# Patient Record
Sex: MALE | Race: WHITE | NOT HISPANIC OR LATINO | Employment: UNEMPLOYED | URBAN - NONMETROPOLITAN AREA
[De-identification: names, ages, dates, MRNs, and addresses within clinical notes are randomized per-mention and may not be internally consistent; named-entity substitution may affect disease eponyms.]

---

## 2019-01-29 ENCOUNTER — APPOINTMENT (OUTPATIENT)
Dept: GENERAL RADIOLOGY | Facility: HOSPITAL | Age: 39
End: 2019-01-29

## 2019-01-29 ENCOUNTER — HOSPITAL ENCOUNTER (INPATIENT)
Facility: HOSPITAL | Age: 39
LOS: 2 days | Discharge: HOME | End: 2019-01-31
Attending: INTERNAL MEDICINE | Admitting: INTERNAL MEDICINE

## 2019-01-29 ENCOUNTER — HOSPITAL ENCOUNTER (INPATIENT)
Facility: HOSPITAL | Age: 39
LOS: 1 days | Discharge: LEFT BEFORE DISCHARGE | End: 2019-01-29
Attending: EMERGENCY MEDICINE | Admitting: INTERNAL MEDICINE

## 2019-01-29 VITALS
OXYGEN SATURATION: 100 % | HEART RATE: 113 BPM | BODY MASS INDEX: 19.78 KG/M2 | DIASTOLIC BLOOD PRESSURE: 52 MMHG | SYSTOLIC BLOOD PRESSURE: 115 MMHG | RESPIRATION RATE: 28 BRPM | TEMPERATURE: 96.4 F | HEIGHT: 73 IN | WEIGHT: 149.2 LBS

## 2019-01-29 DIAGNOSIS — E10.10 DIABETIC KETOACIDOSIS WITHOUT COMA ASSOCIATED WITH TYPE 1 DIABETES MELLITUS (HCC): Primary | ICD-10-CM

## 2019-01-29 PROBLEM — N17.9 ACUTE RENAL FAILURE (HCC): Status: ACTIVE | Noted: 2019-01-29

## 2019-01-29 PROBLEM — E87.20 LACTIC ACIDOSIS: Status: ACTIVE | Noted: 2019-01-29

## 2019-01-29 PROBLEM — E86.9 VOLUME DEPLETION: Status: ACTIVE | Noted: 2019-01-29

## 2019-01-29 PROBLEM — E11.10 DKA (DIABETIC KETOACIDOSES): Status: ACTIVE | Noted: 2019-01-29

## 2019-01-29 PROBLEM — D72.829 LEUKOCYTOSIS: Status: ACTIVE | Noted: 2019-01-29

## 2019-01-29 PROBLEM — E87.1 HYPONATREMIA: Status: ACTIVE | Noted: 2019-01-29

## 2019-01-29 PROBLEM — E11.10 TYPE 2 DIABETES MELLITUS WITH KETOACIDOSIS, WITH LONG-TERM CURRENT USE OF INSULIN (HCC): Status: ACTIVE | Noted: 2019-01-29

## 2019-01-29 PROBLEM — Z79.4 TYPE 2 DIABETES MELLITUS WITH KETOACIDOSIS, WITH LONG-TERM CURRENT USE OF INSULIN (HCC): Status: ACTIVE | Noted: 2019-01-29

## 2019-01-29 PROBLEM — Z72.0 TOBACCO ABUSE: Status: ACTIVE | Noted: 2019-01-29

## 2019-01-29 PROBLEM — E87.5 HYPERKALEMIA: Status: ACTIVE | Noted: 2019-01-29

## 2019-01-29 LAB
ALBUMIN SERPL-MCNC: 3.7 G/DL (ref 3.5–5)
ALBUMIN SERPL-MCNC: 3.9 G/DL (ref 3.5–5)
ALBUMIN SERPL-MCNC: 4.3 G/DL (ref 3.5–5)
ALBUMIN/GLOB SERPL: 1.7 G/DL (ref 1.1–2.5)
ALBUMIN/GLOB SERPL: 1.8 G/DL (ref 1.1–2.5)
ALBUMIN/GLOB SERPL: 2 G/DL (ref 1.1–2.5)
ALP SERPL-CCNC: 228 U/L (ref 24–120)
ALP SERPL-CCNC: 260 U/L (ref 24–120)
ALP SERPL-CCNC: 292 U/L (ref 24–120)
ALT SERPL W P-5'-P-CCNC: 49 U/L (ref 0–54)
ALT SERPL W P-5'-P-CCNC: 51 U/L (ref 0–54)
ALT SERPL W P-5'-P-CCNC: 59 U/L (ref 0–54)
AMPHET+METHAMPHET UR QL: NEGATIVE
ANION GAP SERPL CALCULATED.3IONS-SCNC: 13 MMOL/L (ref 4–13)
ANION GAP SERPL CALCULATED.3IONS-SCNC: ABNORMAL MMOL/L (ref 4–13)
ARTERIAL PATENCY WRIST A: POSITIVE
ARTERIAL PATENCY WRIST A: POSITIVE
AST SERPL-CCNC: 20 U/L (ref 7–45)
AST SERPL-CCNC: 33 U/L (ref 7–45)
AST SERPL-CCNC: 48 U/L (ref 7–45)
ATMOSPHERIC PRESS: 755 MMHG
ATMOSPHERIC PRESS: 757 MMHG
BACTERIA UR QL AUTO: ABNORMAL /HPF
BARBITURATES UR QL SCN: NEGATIVE
BASE EXCESS BLDA CALC-SCNC: -24.5 MMOL/L (ref 0–2)
BASE EXCESS BLDA CALC-SCNC: -28 MMOL/L (ref 0–2)
BASOPHILS # BLD AUTO: 0.04 10*3/MM3 (ref 0–0.2)
BASOPHILS # BLD AUTO: 0.05 10*3/MM3 (ref 0–0.2)
BASOPHILS # BLD AUTO: 0.12 10*3/MM3 (ref 0–0.2)
BASOPHILS NFR BLD AUTO: 0.2 % (ref 0–2)
BASOPHILS NFR BLD AUTO: 0.2 % (ref 0–2)
BASOPHILS NFR BLD AUTO: 0.9 % (ref 0–2)
BDY SITE: ABNORMAL
BDY SITE: ABNORMAL
BENZODIAZ UR QL SCN: NEGATIVE
BILIRUB SERPL-MCNC: 0.4 MG/DL (ref 0.1–1)
BILIRUB SERPL-MCNC: 0.4 MG/DL (ref 0.1–1)
BILIRUB SERPL-MCNC: 0.6 MG/DL (ref 0.1–1)
BILIRUB UR QL STRIP: NEGATIVE
BODY TEMPERATURE: 37 C
BODY TEMPERATURE: 37 C
BUN BLD-MCNC: 25 MG/DL (ref 5–21)
BUN BLD-MCNC: 26 MG/DL (ref 5–21)
BUN BLD-MCNC: 28 MG/DL (ref 5–21)
BUN BLD-MCNC: 29 MG/DL (ref 5–21)
BUN/CREAT SERPL: 18.2 (ref 7–25)
BUN/CREAT SERPL: 22.4 (ref 7–25)
BUN/CREAT SERPL: 24.2 (ref 7–25)
BUN/CREAT SERPL: 27.5 (ref 7–25)
CALCIUM SPEC-SCNC: 8 MG/DL (ref 8.4–10.4)
CALCIUM SPEC-SCNC: 8.1 MG/DL (ref 8.4–10.4)
CALCIUM SPEC-SCNC: 8.9 MG/DL (ref 8.4–10.4)
CALCIUM SPEC-SCNC: 9.2 MG/DL (ref 8.4–10.4)
CANNABINOIDS SERPL QL: NEGATIVE
CHLORIDE SERPL-SCNC: 111 MMOL/L (ref 98–110)
CHLORIDE SERPL-SCNC: 89 MMOL/L (ref 98–110)
CHLORIDE SERPL-SCNC: 91 MMOL/L (ref 98–110)
CHLORIDE SERPL-SCNC: 96 MMOL/L (ref 98–110)
CLARITY UR: CLEAR
CO2 SERPL-SCNC: 14 MMOL/L (ref 24–31)
CO2 SERPL-SCNC: <5 MMOL/L (ref 24–31)
COCAINE UR QL: NEGATIVE
COLOR UR: YELLOW
CREAT BLD-MCNC: 0.91 MG/DL (ref 0.5–1.4)
CREAT BLD-MCNC: 1.2 MG/DL (ref 0.5–1.4)
CREAT BLD-MCNC: 1.25 MG/DL (ref 0.5–1.4)
CREAT BLD-MCNC: 1.43 MG/DL (ref 0.5–1.4)
D-LACTATE SERPL-SCNC: 4.1 MMOL/L (ref 0.5–2)
D-LACTATE SERPL-SCNC: 5.5 MMOL/L (ref 0.5–2)
DEPRECATED RDW RBC AUTO: 37.5 FL (ref 40–54)
DEPRECATED RDW RBC AUTO: 45.5 FL (ref 40–54)
DEPRECATED RDW RBC AUTO: 46.5 FL (ref 40–54)
EOSINOPHIL # BLD AUTO: 0 10*3/MM3 (ref 0–0.7)
EOSINOPHIL # BLD AUTO: 0 10*3/MM3 (ref 0–0.7)
EOSINOPHIL # BLD AUTO: 0.02 10*3/MM3 (ref 0–0.7)
EOSINOPHIL NFR BLD AUTO: 0 % (ref 0–4)
EOSINOPHIL NFR BLD AUTO: 0 % (ref 0–4)
EOSINOPHIL NFR BLD AUTO: 0.2 % (ref 0–4)
ERYTHROCYTE [DISTWIDTH] IN BLOOD BY AUTOMATED COUNT: 12.3 % (ref 12–15)
ERYTHROCYTE [DISTWIDTH] IN BLOOD BY AUTOMATED COUNT: 13.2 % (ref 12–15)
ERYTHROCYTE [DISTWIDTH] IN BLOOD BY AUTOMATED COUNT: 13.2 % (ref 12–15)
GFR SERPL CREATININE-BSD FRML MDRD: 55 ML/MIN/1.73
GFR SERPL CREATININE-BSD FRML MDRD: 64 ML/MIN/1.73
GFR SERPL CREATININE-BSD FRML MDRD: 67 ML/MIN/1.73
GFR SERPL CREATININE-BSD FRML MDRD: 92 ML/MIN/1.73
GLOBULIN UR ELPH-MCNC: 2.2 GM/DL
GLUCOSE BLD-MCNC: 1061 MG/DL (ref 70–100)
GLUCOSE BLD-MCNC: 1178 MG/DL (ref 70–100)
GLUCOSE BLD-MCNC: 1363 MG/DL (ref 70–100)
GLUCOSE BLD-MCNC: 314 MG/DL (ref 70–100)
GLUCOSE BLD-MCNC: 763 MG/DL (ref 70–100)
GLUCOSE BLDC GLUCOMTR-MCNC: 213 MG/DL (ref 70–130)
GLUCOSE BLDC GLUCOMTR-MCNC: 267 MG/DL (ref 70–130)
GLUCOSE BLDC GLUCOMTR-MCNC: 335 MG/DL (ref 70–130)
GLUCOSE BLDC GLUCOMTR-MCNC: 408 MG/DL (ref 70–130)
GLUCOSE BLDC GLUCOMTR-MCNC: 438 MG/DL (ref 70–130)
GLUCOSE UR STRIP-MCNC: ABNORMAL MG/DL
HBA1C MFR BLD: >14 %
HCO3 BLDA-SCNC: 1.9 MMOL/L (ref 20–26)
HCO3 BLDA-SCNC: 3.7 MMOL/L (ref 20–26)
HCT VFR BLD AUTO: 36.3 % (ref 40–52)
HCT VFR BLD AUTO: 38.7 % (ref 40–52)
HCT VFR BLD AUTO: 42.5 % (ref 40–52)
HGB BLD-MCNC: 12.4 G/DL (ref 14–18)
HGB BLD-MCNC: 13.1 G/DL (ref 14–18)
HGB BLD-MCNC: 13.5 G/DL (ref 14–18)
HGB UR QL STRIP.AUTO: ABNORMAL
HOLD SPECIMEN: NORMAL
HYALINE CASTS UR QL AUTO: ABNORMAL /LPF
IMM GRANULOCYTES # BLD AUTO: 0.15 10*3/MM3 (ref 0–0.03)
IMM GRANULOCYTES # BLD AUTO: 0.23 10*3/MM3 (ref 0–0.03)
IMM GRANULOCYTES # BLD AUTO: 0.32 10*3/MM3 (ref 0–0.03)
IMM GRANULOCYTES NFR BLD AUTO: 0.7 % (ref 0–5)
IMM GRANULOCYTES NFR BLD AUTO: 1.1 % (ref 0–5)
IMM GRANULOCYTES NFR BLD AUTO: 2.5 % (ref 0–5)
KETONES UR QL STRIP: ABNORMAL
LEUKOCYTE ESTERASE UR QL STRIP.AUTO: NEGATIVE
LYMPHOCYTES # BLD AUTO: 0.86 10*3/MM3 (ref 0.72–4.86)
LYMPHOCYTES # BLD AUTO: 0.99 10*3/MM3 (ref 0.72–4.86)
LYMPHOCYTES # BLD AUTO: 2.13 10*3/MM3 (ref 0.72–4.86)
LYMPHOCYTES NFR BLD AUTO: 16.5 % (ref 15–45)
LYMPHOCYTES NFR BLD AUTO: 4 % (ref 15–45)
LYMPHOCYTES NFR BLD AUTO: 4.7 % (ref 15–45)
Lab: ABNORMAL
MAGNESIUM SERPL-MCNC: 2.2 MG/DL (ref 1.4–2.2)
MAGNESIUM SERPL-MCNC: 2.2 MG/DL (ref 1.4–2.2)
MAGNESIUM SERPL-MCNC: 2.3 MG/DL (ref 1.4–2.2)
MCH RBC QN AUTO: 30.1 PG (ref 28–32)
MCH RBC QN AUTO: 30.6 PG (ref 28–32)
MCH RBC QN AUTO: 30.8 PG (ref 28–32)
MCHC RBC AUTO-ENTMCNC: 31.8 G/DL (ref 33–36)
MCHC RBC AUTO-ENTMCNC: 32 G/DL (ref 33–36)
MCHC RBC AUTO-ENTMCNC: 36.1 G/DL (ref 33–36)
MCV RBC AUTO: 85.2 FL (ref 82–95)
MCV RBC AUTO: 94.7 FL (ref 82–95)
MCV RBC AUTO: 95.6 FL (ref 82–95)
METHADONE UR QL SCN: NEGATIVE
MODALITY: ABNORMAL
MODALITY: ABNORMAL
MONOCYTES # BLD AUTO: 0.27 10*3/MM3 (ref 0.19–1.3)
MONOCYTES # BLD AUTO: 1.06 10*3/MM3 (ref 0.19–1.3)
MONOCYTES # BLD AUTO: 1.71 10*3/MM3 (ref 0.19–1.3)
MONOCYTES NFR BLD AUTO: 2.1 % (ref 4–12)
MONOCYTES NFR BLD AUTO: 5 % (ref 4–12)
MONOCYTES NFR BLD AUTO: 8 % (ref 4–12)
NEUTROPHILS # BLD AUTO: 10.07 10*3/MM3 (ref 1.87–8.4)
NEUTROPHILS # BLD AUTO: 18.51 10*3/MM3 (ref 1.87–8.4)
NEUTROPHILS # BLD AUTO: 18.78 10*3/MM3 (ref 1.87–8.4)
NEUTROPHILS NFR BLD AUTO: 77.8 % (ref 39–78)
NEUTROPHILS NFR BLD AUTO: 86.7 % (ref 39–78)
NEUTROPHILS NFR BLD AUTO: 89.4 % (ref 39–78)
NITRITE UR QL STRIP: NEGATIVE
NOTIFIED BY: ABNORMAL
NOTIFIED BY: ABNORMAL
NOTIFIED WHO: ABNORMAL
NOTIFIED WHO: ABNORMAL
NRBC BLD AUTO-RTO: 0 /100 WBC (ref 0–0)
OPIATES UR QL: NEGATIVE
PCO2 BLDA: 12.7 MM HG (ref 35–45)
PCO2 BLDA: <8.9 MM HG (ref 35–45)
PCP UR QL SCN: NEGATIVE
PH BLDA: 6.97 PH UNITS (ref 7.35–7.45)
PH BLDA: 7.07 PH UNITS (ref 7.35–7.45)
PH UR STRIP.AUTO: <=5 [PH] (ref 5–8)
PHOSPHATE SERPL-MCNC: 1.1 MG/DL (ref 2.5–4.5)
PHOSPHATE SERPL-MCNC: 4.9 MG/DL (ref 2.5–4.5)
PLATELET # BLD AUTO: 249 10*3/MM3 (ref 130–400)
PLATELET # BLD AUTO: 272 10*3/MM3 (ref 130–400)
PLATELET # BLD AUTO: 295 10*3/MM3 (ref 130–400)
PMV BLD AUTO: 11.5 FL (ref 6–12)
PMV BLD AUTO: 12.4 FL (ref 6–12)
PMV BLD AUTO: 12.5 FL (ref 6–12)
PO2 BLDA: 113 MM HG (ref 83–108)
PO2 BLDA: 164 MM HG (ref 83–108)
POTASSIUM BLD-SCNC: 4.8 MMOL/L (ref 3.5–5.3)
POTASSIUM BLD-SCNC: 5.7 MMOL/L (ref 3.5–5.3)
POTASSIUM BLD-SCNC: 6 MMOL/L (ref 3.5–5.3)
POTASSIUM BLD-SCNC: 6.1 MMOL/L (ref 3.5–5.3)
PROT SERPL-MCNC: 5.9 G/DL (ref 6.3–8.7)
PROT SERPL-MCNC: 6.1 G/DL (ref 6.3–8.7)
PROT SERPL-MCNC: 6.5 G/DL (ref 6.3–8.7)
PROT UR QL STRIP: NEGATIVE
RBC # BLD AUTO: 4.05 10*6/MM3 (ref 4.8–5.9)
RBC # BLD AUTO: 4.26 10*6/MM3 (ref 4.8–5.9)
RBC # BLD AUTO: 4.49 10*6/MM3 (ref 4.8–5.9)
RBC # UR: ABNORMAL /HPF
REF LAB TEST METHOD: ABNORMAL
SAO2 % BLDCOA: 97.2 % (ref 94–99)
SAO2 % BLDCOA: 98.2 % (ref 94–99)
SODIUM BLD-SCNC: 123 MMOL/L (ref 135–145)
SODIUM BLD-SCNC: 129 MMOL/L (ref 135–145)
SODIUM BLD-SCNC: 129 MMOL/L (ref 135–145)
SODIUM BLD-SCNC: 138 MMOL/L (ref 135–145)
SP GR UR STRIP: >=1.03 (ref 1–1.03)
SQUAMOUS #/AREA URNS HPF: ABNORMAL /HPF
TROPONIN I SERPL-MCNC: <0.012 NG/ML (ref 0–0.03)
UROBILINOGEN UR QL STRIP: ABNORMAL
VENTILATOR MODE: ABNORMAL
VENTILATOR MODE: ABNORMAL
WBC NRBC COR # BLD: 12.93 10*3/MM3 (ref 4.8–10.8)
WBC NRBC COR # BLD: 21.02 10*3/MM3 (ref 4.8–10.8)
WBC NRBC COR # BLD: 21.36 10*3/MM3 (ref 4.8–10.8)
WBC UR QL AUTO: ABNORMAL /HPF
WHOLE BLOOD HOLD SPECIMEN: NORMAL

## 2019-01-29 PROCEDURE — 83605 ASSAY OF LACTIC ACID: CPT | Performed by: EMERGENCY MEDICINE

## 2019-01-29 PROCEDURE — 36415 COLL VENOUS BLD VENIPUNCTURE: CPT | Performed by: INTERNAL MEDICINE

## 2019-01-29 PROCEDURE — 83735 ASSAY OF MAGNESIUM: CPT | Performed by: INTERNAL MEDICINE

## 2019-01-29 PROCEDURE — 93005 ELECTROCARDIOGRAM TRACING: CPT | Performed by: EMERGENCY MEDICINE

## 2019-01-29 PROCEDURE — 83735 ASSAY OF MAGNESIUM: CPT | Performed by: EMERGENCY MEDICINE

## 2019-01-29 PROCEDURE — 82803 BLOOD GASES ANY COMBINATION: CPT

## 2019-01-29 PROCEDURE — 99285 EMERGENCY DEPT VISIT HI MDM: CPT

## 2019-01-29 PROCEDURE — 80307 DRUG TEST PRSMV CHEM ANLYZR: CPT | Performed by: EMERGENCY MEDICINE

## 2019-01-29 PROCEDURE — 84100 ASSAY OF PHOSPHORUS: CPT | Performed by: INTERNAL MEDICINE

## 2019-01-29 PROCEDURE — 25010000002 CEFTRIAXONE PER 250 MG: Performed by: EMERGENCY MEDICINE

## 2019-01-29 PROCEDURE — 87040 BLOOD CULTURE FOR BACTERIA: CPT | Performed by: EMERGENCY MEDICINE

## 2019-01-29 PROCEDURE — 93010 ELECTROCARDIOGRAM REPORT: CPT | Performed by: INTERNAL MEDICINE

## 2019-01-29 PROCEDURE — 80053 COMPREHEN METABOLIC PANEL: CPT | Performed by: EMERGENCY MEDICINE

## 2019-01-29 PROCEDURE — 85025 COMPLETE CBC W/AUTO DIFF WBC: CPT | Performed by: EMERGENCY MEDICINE

## 2019-01-29 PROCEDURE — 81001 URINALYSIS AUTO W/SCOPE: CPT | Performed by: EMERGENCY MEDICINE

## 2019-01-29 PROCEDURE — 63710000001 INSULIN REGULAR HUMAN PER 5 UNITS: Performed by: EMERGENCY MEDICINE

## 2019-01-29 PROCEDURE — 71046 X-RAY EXAM CHEST 2 VIEWS: CPT

## 2019-01-29 PROCEDURE — 82962 GLUCOSE BLOOD TEST: CPT

## 2019-01-29 PROCEDURE — 83605 ASSAY OF LACTIC ACID: CPT | Performed by: INTERNAL MEDICINE

## 2019-01-29 PROCEDURE — 80053 COMPREHEN METABOLIC PANEL: CPT | Performed by: INTERNAL MEDICINE

## 2019-01-29 PROCEDURE — 36600 WITHDRAWAL OF ARTERIAL BLOOD: CPT

## 2019-01-29 PROCEDURE — 84484 ASSAY OF TROPONIN QUANT: CPT | Performed by: EMERGENCY MEDICINE

## 2019-01-29 PROCEDURE — 63710000001 INSULIN REGULAR HUMAN PER 5 UNITS: Performed by: INTERNAL MEDICINE

## 2019-01-29 PROCEDURE — 83036 HEMOGLOBIN GLYCOSYLATED A1C: CPT | Performed by: INTERNAL MEDICINE

## 2019-01-29 PROCEDURE — 82947 ASSAY GLUCOSE BLOOD QUANT: CPT | Performed by: INTERNAL MEDICINE

## 2019-01-29 PROCEDURE — 85025 COMPLETE CBC W/AUTO DIFF WBC: CPT | Performed by: INTERNAL MEDICINE

## 2019-01-29 RX ORDER — POTASSIUM CHLORIDE 7.46 G/1000ML
10 INJECTION, SOLUTION INTRAVENOUS AS NEEDED
Status: DISCONTINUED | OUTPATIENT
Start: 2019-01-29 | End: 2019-01-29

## 2019-01-29 RX ORDER — SODIUM CHLORIDE 0.9 % (FLUSH) 0.9 %
3-10 SYRINGE (ML) INJECTION AS NEEDED
Status: DISCONTINUED | OUTPATIENT
Start: 2019-01-29 | End: 2019-01-31 | Stop reason: HOSPADM

## 2019-01-29 RX ORDER — POTASSIUM CHLORIDE 750 MG/1
40 CAPSULE, EXTENDED RELEASE ORAL AS NEEDED
Status: DISCONTINUED | OUTPATIENT
Start: 2019-01-29 | End: 2019-01-29

## 2019-01-29 RX ORDER — DEXTROSE MONOHYDRATE 25 G/50ML
12.5 INJECTION, SOLUTION INTRAVENOUS
Status: DISCONTINUED | OUTPATIENT
Start: 2019-01-29 | End: 2019-01-29

## 2019-01-29 RX ORDER — DEXTROSE, SODIUM CHLORIDE, AND POTASSIUM CHLORIDE 5; .45; .15 G/100ML; G/100ML; G/100ML
150 INJECTION INTRAVENOUS CONTINUOUS PRN
Status: DISCONTINUED | OUTPATIENT
Start: 2019-01-29 | End: 2019-01-30

## 2019-01-29 RX ORDER — POTASSIUM CHLORIDE 7.46 G/1000ML
10 INJECTION, SOLUTION INTRAVENOUS AS NEEDED
Status: DISCONTINUED | OUTPATIENT
Start: 2019-01-29 | End: 2019-01-29 | Stop reason: HOSPADM

## 2019-01-29 RX ORDER — DEXTROSE MONOHYDRATE 25 G/50ML
12.5 INJECTION, SOLUTION INTRAVENOUS
Status: DISCONTINUED | OUTPATIENT
Start: 2019-01-29 | End: 2019-01-29 | Stop reason: HOSPADM

## 2019-01-29 RX ORDER — POTASSIUM CHLORIDE 750 MG/1
10 CAPSULE, EXTENDED RELEASE ORAL AS NEEDED
Status: DISCONTINUED | OUTPATIENT
Start: 2019-01-29 | End: 2019-01-30

## 2019-01-29 RX ORDER — POTASSIUM CHLORIDE 750 MG/1
20 CAPSULE, EXTENDED RELEASE ORAL AS NEEDED
Status: DISCONTINUED | OUTPATIENT
Start: 2019-01-29 | End: 2019-01-29 | Stop reason: HOSPADM

## 2019-01-29 RX ORDER — LORAZEPAM 2 MG/ML
1 INJECTION INTRAMUSCULAR EVERY 6 HOURS PRN
Status: DISCONTINUED | OUTPATIENT
Start: 2019-01-29 | End: 2019-01-31 | Stop reason: HOSPADM

## 2019-01-29 RX ORDER — SODIUM CHLORIDE AND POTASSIUM CHLORIDE 150; 450 MG/100ML; MG/100ML
250 INJECTION, SOLUTION INTRAVENOUS CONTINUOUS PRN
Status: DISCONTINUED | OUTPATIENT
Start: 2019-01-29 | End: 2019-01-29 | Stop reason: HOSPADM

## 2019-01-29 RX ORDER — POTASSIUM CHLORIDE 1.5 G/1.77G
10 POWDER, FOR SOLUTION ORAL AS NEEDED
Status: DISCONTINUED | OUTPATIENT
Start: 2019-01-29 | End: 2019-01-29 | Stop reason: HOSPADM

## 2019-01-29 RX ORDER — POTASSIUM CHLORIDE 1.5 G/1.77G
40 POWDER, FOR SOLUTION ORAL AS NEEDED
Status: DISCONTINUED | OUTPATIENT
Start: 2019-01-29 | End: 2019-01-29 | Stop reason: HOSPADM

## 2019-01-29 RX ORDER — POTASSIUM CHLORIDE 750 MG/1
20 CAPSULE, EXTENDED RELEASE ORAL AS NEEDED
Status: DISCONTINUED | OUTPATIENT
Start: 2019-01-29 | End: 2019-01-30

## 2019-01-29 RX ORDER — POTASSIUM CHLORIDE 7.46 G/1000ML
10 INJECTION, SOLUTION INTRAVENOUS AS NEEDED
Status: DISCONTINUED | OUTPATIENT
Start: 2019-01-29 | End: 2019-01-30

## 2019-01-29 RX ORDER — SODIUM CHLORIDE AND POTASSIUM CHLORIDE 150; 450 MG/100ML; MG/100ML
250 INJECTION, SOLUTION INTRAVENOUS CONTINUOUS PRN
Status: DISCONTINUED | OUTPATIENT
Start: 2019-01-29 | End: 2019-01-30

## 2019-01-29 RX ORDER — POTASSIUM CHLORIDE 1.5 G/1.77G
20 POWDER, FOR SOLUTION ORAL AS NEEDED
Status: DISCONTINUED | OUTPATIENT
Start: 2019-01-29 | End: 2019-01-29

## 2019-01-29 RX ORDER — POTASSIUM CHLORIDE 1.5 G/1.77G
40 POWDER, FOR SOLUTION ORAL AS NEEDED
Status: DISCONTINUED | OUTPATIENT
Start: 2019-01-29 | End: 2019-01-30

## 2019-01-29 RX ORDER — POTASSIUM CHLORIDE 750 MG/1
20 CAPSULE, EXTENDED RELEASE ORAL AS NEEDED
Status: DISCONTINUED | OUTPATIENT
Start: 2019-01-29 | End: 2019-01-29

## 2019-01-29 RX ORDER — ONDANSETRON 2 MG/ML
4 INJECTION INTRAMUSCULAR; INTRAVENOUS EVERY 6 HOURS PRN
Status: DISCONTINUED | OUTPATIENT
Start: 2019-01-29 | End: 2019-01-31 | Stop reason: HOSPADM

## 2019-01-29 RX ORDER — OLANZAPINE 10 MG/1
10 TABLET, ORALLY DISINTEGRATING ORAL ONCE
Status: COMPLETED | OUTPATIENT
Start: 2019-01-29 | End: 2019-01-29

## 2019-01-29 RX ORDER — POTASSIUM CHLORIDE 750 MG/1
40 CAPSULE, EXTENDED RELEASE ORAL AS NEEDED
Status: DISCONTINUED | OUTPATIENT
Start: 2019-01-29 | End: 2019-01-30

## 2019-01-29 RX ORDER — NICOTINE 21 MG/24HR
1 PATCH, TRANSDERMAL 24 HOURS TRANSDERMAL EVERY 24 HOURS
Status: DISCONTINUED | OUTPATIENT
Start: 2019-01-29 | End: 2019-01-31 | Stop reason: HOSPADM

## 2019-01-29 RX ORDER — ONDANSETRON 2 MG/ML
4 INJECTION INTRAMUSCULAR; INTRAVENOUS EVERY 6 HOURS PRN
Status: DISCONTINUED | OUTPATIENT
Start: 2019-01-29 | End: 2019-01-29 | Stop reason: HOSPADM

## 2019-01-29 RX ORDER — SODIUM CHLORIDE 0.9 % (FLUSH) 0.9 %
3 SYRINGE (ML) INJECTION EVERY 12 HOURS SCHEDULED
Status: DISCONTINUED | OUTPATIENT
Start: 2019-01-29 | End: 2019-01-31 | Stop reason: HOSPADM

## 2019-01-29 RX ORDER — POTASSIUM CHLORIDE 1.5 G/1.77G
20 POWDER, FOR SOLUTION ORAL AS NEEDED
Status: DISCONTINUED | OUTPATIENT
Start: 2019-01-29 | End: 2019-01-29 | Stop reason: HOSPADM

## 2019-01-29 RX ORDER — POTASSIUM CHLORIDE 750 MG/1
10 CAPSULE, EXTENDED RELEASE ORAL AS NEEDED
Status: DISCONTINUED | OUTPATIENT
Start: 2019-01-29 | End: 2019-01-29

## 2019-01-29 RX ORDER — POTASSIUM CHLORIDE 1.5 G/1.77G
10 POWDER, FOR SOLUTION ORAL AS NEEDED
Status: DISCONTINUED | OUTPATIENT
Start: 2019-01-29 | End: 2019-01-29

## 2019-01-29 RX ORDER — DEXTROSE, SODIUM CHLORIDE, AND POTASSIUM CHLORIDE 5; .45; .15 G/100ML; G/100ML; G/100ML
150 INJECTION INTRAVENOUS CONTINUOUS PRN
Status: DISCONTINUED | OUTPATIENT
Start: 2019-01-29 | End: 2019-01-29

## 2019-01-29 RX ORDER — SODIUM CHLORIDE 450 MG/100ML
250 INJECTION, SOLUTION INTRAVENOUS CONTINUOUS
Status: DISCONTINUED | OUTPATIENT
Start: 2019-01-29 | End: 2019-01-30

## 2019-01-29 RX ORDER — POTASSIUM CHLORIDE 750 MG/1
10 CAPSULE, EXTENDED RELEASE ORAL AS NEEDED
Status: DISCONTINUED | OUTPATIENT
Start: 2019-01-29 | End: 2019-01-29 | Stop reason: HOSPADM

## 2019-01-29 RX ORDER — POTASSIUM CHLORIDE 1.5 G/1.77G
20 POWDER, FOR SOLUTION ORAL AS NEEDED
Status: DISCONTINUED | OUTPATIENT
Start: 2019-01-29 | End: 2019-01-30

## 2019-01-29 RX ORDER — DEXTROSE AND SODIUM CHLORIDE 5; .45 G/100ML; G/100ML
150 INJECTION, SOLUTION INTRAVENOUS CONTINUOUS PRN
Status: DISCONTINUED | OUTPATIENT
Start: 2019-01-29 | End: 2019-01-29 | Stop reason: HOSPADM

## 2019-01-29 RX ORDER — SODIUM CHLORIDE 0.9 % (FLUSH) 0.9 %
3 SYRINGE (ML) INJECTION EVERY 12 HOURS SCHEDULED
Status: DISCONTINUED | OUTPATIENT
Start: 2019-01-29 | End: 2019-01-29 | Stop reason: HOSPADM

## 2019-01-29 RX ORDER — DEXTROSE MONOHYDRATE 25 G/50ML
25-50 INJECTION, SOLUTION INTRAVENOUS
Status: DISCONTINUED | OUTPATIENT
Start: 2019-01-29 | End: 2019-01-29

## 2019-01-29 RX ORDER — SODIUM CHLORIDE AND POTASSIUM CHLORIDE 150; 450 MG/100ML; MG/100ML
250 INJECTION, SOLUTION INTRAVENOUS CONTINUOUS PRN
Status: DISCONTINUED | OUTPATIENT
Start: 2019-01-29 | End: 2019-01-29

## 2019-01-29 RX ORDER — POTASSIUM CHLORIDE 1.5 G/1.77G
10 POWDER, FOR SOLUTION ORAL AS NEEDED
Status: DISCONTINUED | OUTPATIENT
Start: 2019-01-29 | End: 2019-01-30

## 2019-01-29 RX ORDER — DEXTROSE AND SODIUM CHLORIDE 5; .45 G/100ML; G/100ML
150 INJECTION, SOLUTION INTRAVENOUS CONTINUOUS PRN
Status: DISCONTINUED | OUTPATIENT
Start: 2019-01-29 | End: 2019-01-29

## 2019-01-29 RX ORDER — POTASSIUM CHLORIDE 750 MG/1
40 CAPSULE, EXTENDED RELEASE ORAL AS NEEDED
Status: DISCONTINUED | OUTPATIENT
Start: 2019-01-29 | End: 2019-01-29 | Stop reason: HOSPADM

## 2019-01-29 RX ORDER — DEXTROSE, SODIUM CHLORIDE, AND POTASSIUM CHLORIDE 5; .45; .15 G/100ML; G/100ML; G/100ML
150 INJECTION INTRAVENOUS CONTINUOUS PRN
Status: DISCONTINUED | OUTPATIENT
Start: 2019-01-29 | End: 2019-01-29 | Stop reason: HOSPADM

## 2019-01-29 RX ORDER — DEXTROSE AND SODIUM CHLORIDE 5; .45 G/100ML; G/100ML
150 INJECTION, SOLUTION INTRAVENOUS CONTINUOUS PRN
Status: DISCONTINUED | OUTPATIENT
Start: 2019-01-29 | End: 2019-01-30

## 2019-01-29 RX ORDER — ACETAMINOPHEN 325 MG/1
650 TABLET ORAL EVERY 4 HOURS PRN
Status: DISCONTINUED | OUTPATIENT
Start: 2019-01-29 | End: 2019-01-29 | Stop reason: HOSPADM

## 2019-01-29 RX ORDER — POTASSIUM CHLORIDE 1.5 G/1.77G
40 POWDER, FOR SOLUTION ORAL AS NEEDED
Status: DISCONTINUED | OUTPATIENT
Start: 2019-01-29 | End: 2019-01-29

## 2019-01-29 RX ORDER — SODIUM CHLORIDE 450 MG/100ML
250 INJECTION, SOLUTION INTRAVENOUS CONTINUOUS
Status: DISCONTINUED | OUTPATIENT
Start: 2019-01-29 | End: 2019-01-29 | Stop reason: HOSPADM

## 2019-01-29 RX ORDER — DEXTROSE MONOHYDRATE 25 G/50ML
12.5 INJECTION, SOLUTION INTRAVENOUS
Status: DISCONTINUED | OUTPATIENT
Start: 2019-01-29 | End: 2019-01-30

## 2019-01-29 RX ORDER — VENLAFAXINE HYDROCHLORIDE 75 MG/1
75 CAPSULE, EXTENDED RELEASE ORAL DAILY
Status: ON HOLD | COMMUNITY
End: 2019-11-30 | Stop reason: SDDI

## 2019-01-29 RX ORDER — SODIUM CHLORIDE 0.9 % (FLUSH) 0.9 %
3-10 SYRINGE (ML) INJECTION AS NEEDED
Status: DISCONTINUED | OUTPATIENT
Start: 2019-01-29 | End: 2019-01-29 | Stop reason: HOSPADM

## 2019-01-29 RX ORDER — ACETAMINOPHEN 325 MG/1
650 TABLET ORAL EVERY 4 HOURS PRN
Status: DISCONTINUED | OUTPATIENT
Start: 2019-01-29 | End: 2019-01-31 | Stop reason: HOSPADM

## 2019-01-29 RX ADMIN — SODIUM CHLORIDE 1000 ML: 9 INJECTION, SOLUTION INTRAVENOUS at 13:45

## 2019-01-29 RX ADMIN — SODIUM CHLORIDE 1000 ML: 9 INJECTION, SOLUTION INTRAVENOUS at 09:31

## 2019-01-29 RX ADMIN — SODIUM CHLORIDE 0.1 UNITS/KG/HR: 9 INJECTION, SOLUTION INTRAVENOUS at 09:50

## 2019-01-29 RX ADMIN — SODIUM CHLORIDE 4 UNITS/HR: 9 INJECTION, SOLUTION INTRAVENOUS at 14:43

## 2019-01-29 RX ADMIN — Medication 100 MEQ: at 10:06

## 2019-01-29 RX ADMIN — SODIUM CHLORIDE 250 ML/HR: 4.5 INJECTION, SOLUTION INTRAVENOUS at 21:38

## 2019-01-29 RX ADMIN — SODIUM CHLORIDE 250 ML/HR: 4.5 INJECTION, SOLUTION INTRAVENOUS at 15:09

## 2019-01-29 RX ADMIN — NICOTINE 1 PATCH: 21 PATCH, EXTENDED RELEASE TRANSDERMAL at 15:03

## 2019-01-29 RX ADMIN — SODIUM CHLORIDE 250 ML/HR: 4.5 INJECTION, SOLUTION INTRAVENOUS at 09:50

## 2019-01-29 RX ADMIN — SODIUM CHLORIDE 1000 ML: 9 INJECTION, SOLUTION INTRAVENOUS at 16:48

## 2019-01-29 RX ADMIN — INSULIN HUMAN 6.8 UNITS: 100 INJECTION, SOLUTION PARENTERAL at 14:51

## 2019-01-29 RX ADMIN — OLANZAPINE 10 MG: 10 TABLET, ORALLY DISINTEGRATING ORAL at 10:07

## 2019-01-29 RX ADMIN — INSULIN HUMAN 6.8 UNITS: 100 INJECTION, SOLUTION PARENTERAL at 09:38

## 2019-01-29 RX ADMIN — SODIUM CHLORIDE, PRESERVATIVE FREE 3 ML: 5 INJECTION INTRAVENOUS at 14:49

## 2019-01-29 RX ADMIN — SODIUM CHLORIDE 2031 ML: 9 INJECTION, SOLUTION INTRAVENOUS at 10:57

## 2019-01-29 RX ADMIN — CEFTRIAXONE SODIUM 1 G: 1 INJECTION, POWDER, FOR SOLUTION INTRAMUSCULAR; INTRAVENOUS at 10:56

## 2019-01-29 RX ADMIN — SODIUM CHLORIDE 4 UNITS/KG/HR: 9 INJECTION, SOLUTION INTRAVENOUS at 21:47

## 2019-01-30 LAB
ANION GAP SERPL CALCULATED.3IONS-SCNC: 4 MMOL/L (ref 4–13)
ANION GAP SERPL CALCULATED.3IONS-SCNC: 9 MMOL/L (ref 4–13)
ARTICHOKE IGE QN: 131 MG/DL (ref 0–99)
BUN BLD-MCNC: 23 MG/DL (ref 5–21)
BUN BLD-MCNC: 24 MG/DL (ref 5–21)
BUN/CREAT SERPL: 33.8 (ref 7–25)
BUN/CREAT SERPL: 34.3 (ref 7–25)
CA-I BLD-MCNC: 4.83 MG/DL (ref 4.6–5.4)
CA-I BLD-MCNC: 4.83 MG/DL (ref 4.6–5.4)
CALCIUM SPEC-SCNC: 8.8 MG/DL (ref 8.4–10.4)
CALCIUM SPEC-SCNC: 8.8 MG/DL (ref 8.4–10.4)
CHLORIDE SERPL-SCNC: 112 MMOL/L (ref 98–110)
CHLORIDE SERPL-SCNC: 114 MMOL/L (ref 98–110)
CHOLEST SERPL-MCNC: 176 MG/DL (ref 130–200)
CO2 SERPL-SCNC: 19 MMOL/L (ref 24–31)
CO2 SERPL-SCNC: 20 MMOL/L (ref 24–31)
CREAT BLD-MCNC: 0.68 MG/DL (ref 0.5–1.4)
CREAT BLD-MCNC: 0.7 MG/DL (ref 0.5–1.4)
D-LACTATE SERPL-SCNC: 1 MMOL/L (ref 0.5–2)
GFR SERPL CREATININE-BSD FRML MDRD: 125 ML/MIN/1.73
GFR SERPL CREATININE-BSD FRML MDRD: 129 ML/MIN/1.73
GLUCOSE BLD-MCNC: 158 MG/DL (ref 70–100)
GLUCOSE BLD-MCNC: 252 MG/DL (ref 70–100)
GLUCOSE BLDC GLUCOMTR-MCNC: 168 MG/DL (ref 70–130)
GLUCOSE BLDC GLUCOMTR-MCNC: 183 MG/DL (ref 70–130)
GLUCOSE BLDC GLUCOMTR-MCNC: 208 MG/DL (ref 70–130)
GLUCOSE BLDC GLUCOMTR-MCNC: 209 MG/DL (ref 70–130)
GLUCOSE BLDC GLUCOMTR-MCNC: 224 MG/DL (ref 70–130)
GLUCOSE BLDC GLUCOMTR-MCNC: 240 MG/DL (ref 70–130)
GLUCOSE BLDC GLUCOMTR-MCNC: 260 MG/DL (ref 70–130)
GLUCOSE BLDC GLUCOMTR-MCNC: 260 MG/DL (ref 70–130)
GLUCOSE BLDC GLUCOMTR-MCNC: 263 MG/DL (ref 70–130)
GLUCOSE BLDC GLUCOMTR-MCNC: 281 MG/DL (ref 70–130)
GLUCOSE BLDC GLUCOMTR-MCNC: 325 MG/DL (ref 70–130)
GLUCOSE BLDC GLUCOMTR-MCNC: 328 MG/DL (ref 70–130)
GLUCOSE BLDC GLUCOMTR-MCNC: 90 MG/DL (ref 70–130)
HDLC SERPL-MCNC: 38 MG/DL
LDLC/HDLC SERPL: 3.05 {RATIO}
Lab: NORMAL
Lab: NORMAL
MAGNESIUM SERPL-MCNC: 2 MG/DL (ref 1.4–2.2)
MAGNESIUM SERPL-MCNC: 2 MG/DL (ref 1.4–2.2)
PHOSPHATE SERPL-MCNC: 1.9 MG/DL (ref 2.5–4.5)
PHOSPHATE SERPL-MCNC: 2.4 MG/DL (ref 2.5–4.5)
POTASSIUM BLD-SCNC: 3.5 MMOL/L (ref 3.5–5.3)
POTASSIUM BLD-SCNC: 4.1 MMOL/L (ref 3.5–5.3)
SODIUM BLD-SCNC: 138 MMOL/L (ref 135–145)
SODIUM BLD-SCNC: 140 MMOL/L (ref 135–145)
TRIGL SERPL-MCNC: 110 MG/DL (ref 0–149)

## 2019-01-30 PROCEDURE — 83605 ASSAY OF LACTIC ACID: CPT | Performed by: INTERNAL MEDICINE

## 2019-01-30 PROCEDURE — 84100 ASSAY OF PHOSPHORUS: CPT | Performed by: INTERNAL MEDICINE

## 2019-01-30 PROCEDURE — 83735 ASSAY OF MAGNESIUM: CPT | Performed by: INTERNAL MEDICINE

## 2019-01-30 PROCEDURE — 63710000001 INSULIN ISOPHANE & REGULAR PER 5 UNITS: Performed by: INTERNAL MEDICINE

## 2019-01-30 PROCEDURE — 36415 COLL VENOUS BLD VENIPUNCTURE: CPT | Performed by: INTERNAL MEDICINE

## 2019-01-30 PROCEDURE — 80048 BASIC METABOLIC PNL TOTAL CA: CPT | Performed by: INTERNAL MEDICINE

## 2019-01-30 PROCEDURE — 82962 GLUCOSE BLOOD TEST: CPT

## 2019-01-30 PROCEDURE — 82330 ASSAY OF CALCIUM: CPT

## 2019-01-30 PROCEDURE — 80061 LIPID PANEL: CPT | Performed by: INTERNAL MEDICINE

## 2019-01-30 PROCEDURE — 63710000001 INSULIN LISPRO (HUMAN) PER 5 UNITS: Performed by: INTERNAL MEDICINE

## 2019-01-30 RX ORDER — VENLAFAXINE HYDROCHLORIDE 75 MG/1
75 CAPSULE, EXTENDED RELEASE ORAL DAILY
Status: DISCONTINUED | OUTPATIENT
Start: 2019-01-30 | End: 2019-01-31 | Stop reason: HOSPADM

## 2019-01-30 RX ORDER — NICOTINE POLACRILEX 4 MG
15 LOZENGE BUCCAL
Status: DISCONTINUED | OUTPATIENT
Start: 2019-01-30 | End: 2019-01-31 | Stop reason: HOSPADM

## 2019-01-30 RX ORDER — DEXTROSE MONOHYDRATE 25 G/50ML
25 INJECTION, SOLUTION INTRAVENOUS
Status: DISCONTINUED | OUTPATIENT
Start: 2019-01-30 | End: 2019-01-31 | Stop reason: HOSPADM

## 2019-01-30 RX ADMIN — INSULIN HUMAN 15 UNITS: 100 INJECTION, SUSPENSION SUBCUTANEOUS at 17:43

## 2019-01-30 RX ADMIN — POTASSIUM PHOSPHATE, MONOBASIC AND POTASSIUM PHOSPHATE, DIBASIC 30 MMOL: 224; 236 INJECTION, SOLUTION INTRAVENOUS at 09:45

## 2019-01-30 RX ADMIN — NICOTINE 1 PATCH: 21 PATCH, EXTENDED RELEASE TRANSDERMAL at 15:02

## 2019-01-30 RX ADMIN — INSULIN LISPRO 7 UNITS: 100 INJECTION, SOLUTION INTRAVENOUS; SUBCUTANEOUS at 17:43

## 2019-01-30 RX ADMIN — POTASSIUM CHLORIDE, DEXTROSE MONOHYDRATE AND SODIUM CHLORIDE 150 ML/HR: 150; 5; 450 INJECTION, SOLUTION INTRAVENOUS at 00:35

## 2019-01-30 RX ADMIN — SODIUM CHLORIDE, PRESERVATIVE FREE 3 ML: 5 INJECTION INTRAVENOUS at 20:58

## 2019-01-30 RX ADMIN — VENLAFAXINE HYDROCHLORIDE 75 MG: 75 CAPSULE, EXTENDED RELEASE ORAL at 09:45

## 2019-01-30 RX ADMIN — INSULIN HUMAN 15 UNITS: 100 INJECTION, SUSPENSION SUBCUTANEOUS at 09:45

## 2019-01-31 VITALS
DIASTOLIC BLOOD PRESSURE: 86 MMHG | HEIGHT: 71 IN | SYSTOLIC BLOOD PRESSURE: 131 MMHG | RESPIRATION RATE: 16 BRPM | OXYGEN SATURATION: 95 % | WEIGHT: 156.6 LBS | BODY MASS INDEX: 21.92 KG/M2 | TEMPERATURE: 98.4 F | HEART RATE: 99 BPM

## 2019-01-31 LAB
ALBUMIN SERPL-MCNC: 2.8 G/DL (ref 3.5–5)
ALBUMIN/GLOB SERPL: 1.5 G/DL (ref 1.1–2.5)
ALP SERPL-CCNC: 183 U/L (ref 24–120)
ALT SERPL W P-5'-P-CCNC: 49 U/L (ref 0–54)
ANION GAP SERPL CALCULATED.3IONS-SCNC: 8 MMOL/L (ref 4–13)
AST SERPL-CCNC: 32 U/L (ref 7–45)
BASOPHILS # BLD AUTO: 0.04 10*3/MM3 (ref 0–0.2)
BASOPHILS NFR BLD AUTO: 0.5 % (ref 0–2)
BILIRUB SERPL-MCNC: 0.8 MG/DL (ref 0.1–1)
BUN BLD-MCNC: 17 MG/DL (ref 5–21)
BUN/CREAT SERPL: 27.4 (ref 7–25)
CALCIUM SPEC-SCNC: 8.6 MG/DL (ref 8.4–10.4)
CHLORIDE SERPL-SCNC: 104 MMOL/L (ref 98–110)
CO2 SERPL-SCNC: 21 MMOL/L (ref 24–31)
CREAT BLD-MCNC: 0.62 MG/DL (ref 0.5–1.4)
DEPRECATED RDW RBC AUTO: 40 FL (ref 40–54)
EOSINOPHIL # BLD AUTO: 0.09 10*3/MM3 (ref 0–0.7)
EOSINOPHIL NFR BLD AUTO: 1.1 % (ref 0–4)
ERYTHROCYTE [DISTWIDTH] IN BLOOD BY AUTOMATED COUNT: 13 % (ref 12–15)
GFR SERPL CREATININE-BSD FRML MDRD: 144 ML/MIN/1.73
GLOBULIN UR ELPH-MCNC: 1.9 GM/DL
GLUCOSE BLD-MCNC: 326 MG/DL (ref 70–100)
GLUCOSE BLDC GLUCOMTR-MCNC: 354 MG/DL (ref 70–130)
GLUCOSE BLDC GLUCOMTR-MCNC: 392 MG/DL (ref 70–130)
HCT VFR BLD AUTO: 32.2 % (ref 40–52)
HGB BLD-MCNC: 11.5 G/DL (ref 14–18)
IMM GRANULOCYTES # BLD AUTO: 0.04 10*3/MM3 (ref 0–0.03)
IMM GRANULOCYTES NFR BLD AUTO: 0.5 % (ref 0–5)
LYMPHOCYTES # BLD AUTO: 1.61 10*3/MM3 (ref 0.72–4.86)
LYMPHOCYTES NFR BLD AUTO: 19.3 % (ref 15–45)
MAGNESIUM SERPL-MCNC: 1.7 MG/DL (ref 1.4–2.2)
MCH RBC QN AUTO: 30.3 PG (ref 28–32)
MCHC RBC AUTO-ENTMCNC: 35.7 G/DL (ref 33–36)
MCV RBC AUTO: 85 FL (ref 82–95)
MONOCYTES # BLD AUTO: 0.43 10*3/MM3 (ref 0.19–1.3)
MONOCYTES NFR BLD AUTO: 5.1 % (ref 4–12)
NEUTROPHILS # BLD AUTO: 6.14 10*3/MM3 (ref 1.87–8.4)
NEUTROPHILS NFR BLD AUTO: 73.5 % (ref 39–78)
NRBC BLD AUTO-RTO: 0 /100 WBC (ref 0–0)
PHOSPHATE SERPL-MCNC: 3.1 MG/DL (ref 2.5–4.5)
PLATELET # BLD AUTO: 185 10*3/MM3 (ref 130–400)
PMV BLD AUTO: 12 FL (ref 6–12)
POTASSIUM BLD-SCNC: 4.1 MMOL/L (ref 3.5–5.3)
PROT SERPL-MCNC: 4.7 G/DL (ref 6.3–8.7)
RBC # BLD AUTO: 3.79 10*6/MM3 (ref 4.8–5.9)
SODIUM BLD-SCNC: 133 MMOL/L (ref 135–145)
WBC NRBC COR # BLD: 8.35 10*3/MM3 (ref 4.8–10.8)

## 2019-01-31 PROCEDURE — 84100 ASSAY OF PHOSPHORUS: CPT | Performed by: INTERNAL MEDICINE

## 2019-01-31 PROCEDURE — 85025 COMPLETE CBC W/AUTO DIFF WBC: CPT | Performed by: INTERNAL MEDICINE

## 2019-01-31 PROCEDURE — 63710000001 INSULIN ISOPHANE & REGULAR PER 5 UNITS: Performed by: INTERNAL MEDICINE

## 2019-01-31 PROCEDURE — 63710000001 INSULIN LISPRO (HUMAN) PER 5 UNITS: Performed by: INTERNAL MEDICINE

## 2019-01-31 PROCEDURE — 82962 GLUCOSE BLOOD TEST: CPT

## 2019-01-31 PROCEDURE — 83735 ASSAY OF MAGNESIUM: CPT | Performed by: INTERNAL MEDICINE

## 2019-01-31 PROCEDURE — 80053 COMPREHEN METABOLIC PANEL: CPT | Performed by: INTERNAL MEDICINE

## 2019-01-31 RX ORDER — INSULIN GLARGINE 100 [IU]/ML
25 INJECTION, SOLUTION SUBCUTANEOUS NIGHTLY
Qty: 3 PEN | Refills: 1 | Status: ON HOLD | OUTPATIENT
Start: 2019-01-31 | End: 2019-11-30

## 2019-01-31 RX ORDER — BLOOD-GLUCOSE METER
1 KIT MISCELLANEOUS AS NEEDED
Qty: 1 EACH | Refills: 0 | Status: ON HOLD | OUTPATIENT
Start: 2019-01-31 | End: 2019-11-30

## 2019-01-31 RX ORDER — PEN NEEDLE, DIABETIC 30 GX3/16"
1 NEEDLE, DISPOSABLE MISCELLANEOUS NIGHTLY
Qty: 100 EACH | Refills: 1 | Status: ON HOLD | OUTPATIENT
Start: 2019-01-31 | End: 2019-11-30

## 2019-01-31 RX ADMIN — METFORMIN HYDROCHLORIDE 1000 MG: 500 TABLET ORAL at 12:21

## 2019-01-31 RX ADMIN — VENLAFAXINE HYDROCHLORIDE 75 MG: 75 CAPSULE, EXTENDED RELEASE ORAL at 08:32

## 2019-01-31 RX ADMIN — INSULIN HUMAN 15 UNITS: 100 INJECTION, SUSPENSION SUBCUTANEOUS at 10:13

## 2019-01-31 RX ADMIN — SODIUM CHLORIDE, PRESERVATIVE FREE 3 ML: 5 INJECTION INTRAVENOUS at 08:33

## 2019-01-31 RX ADMIN — INSULIN LISPRO 8 UNITS: 100 INJECTION, SOLUTION INTRAVENOUS; SUBCUTANEOUS at 08:32

## 2019-01-31 RX ADMIN — INSULIN LISPRO 8 UNITS: 100 INJECTION, SOLUTION INTRAVENOUS; SUBCUTANEOUS at 12:20

## 2019-02-01 ENCOUNTER — READMISSION MANAGEMENT (OUTPATIENT)
Dept: CALL CENTER | Facility: HOSPITAL | Age: 39
End: 2019-02-01

## 2019-02-01 NOTE — OUTREACH NOTE
Prep Survey      Responses   Facility patient discharged from?  [facility]   Is patient eligible?  Yes   Discharge diagnosis  DKA   Does the patient have one of the following disease processes/diagnoses(primary or secondary)?  Other   Does the patient have Home health ordered?  No   Is there a DME ordered?  No   Comments regarding appointments  call for apmt - see AVS   Prep survey completed?  Yes          Bela Patel RN

## 2019-02-03 LAB
BACTERIA SPEC AEROBE CULT: NORMAL
BACTERIA SPEC AEROBE CULT: NORMAL

## 2019-02-04 ENCOUNTER — READMISSION MANAGEMENT (OUTPATIENT)
Dept: CALL CENTER | Facility: HOSPITAL | Age: 39
End: 2019-02-04

## 2019-02-04 NOTE — OUTREACH NOTE
Medical Week 1 Survey      Responses   Facility patient discharged from?  [facility]   Does the patient have one of the following disease processes/diagnoses(primary or secondary)?  Other   Is there a successful TCM telephone encounter documented?  No   Week 1 attempt successful?  Yes   Call start time  1408   Call end time  1418   Person spoke with today (if not patient) and relationship  Herlinda Camarillo mother   Meds reviewed with patient/caregiver?  Yes   Is the patient having any side effects they believe may be caused by any medication additions or changes?  No   Does the patient have all medications ordered at discharge?  Yes   Is the patient taking all medications as directed (includes completed medication regime)?  Yes   Does the patient have a primary care provider?   Yes   Does the patient have an appointment with their PCP within 7 days of discharge?  Yes   Has the patient kept scheduled appointments due by today?  N/A   Psychosocial issues?  No   Did the patient receive a copy of their discharge instructions?  Yes   Nursing interventions  Reviewed instructions with patient   What is the patient's perception of their health status since discharge?  Same   Is the patient/caregiver able to teach back signs and symptoms related to disease process for when to call PCP?  Yes   Is the patient/caregiver able to teach back signs and symptoms related to disease process for when to call 911?  Yes   Is the patient/caregiver able to teach back the hierarchy of who to call/visit for symptoms/problems? PCP, Specialist, Home health nurse, Urgent Care, ED, 911  Yes   Week 1 call completed?  Yes          Heather Velasquez RN

## 2019-02-12 ENCOUNTER — READMISSION MANAGEMENT (OUTPATIENT)
Dept: CALL CENTER | Facility: HOSPITAL | Age: 39
End: 2019-02-12

## 2019-02-12 NOTE — OUTREACH NOTE
Medical Week 2 Survey      Responses   Facility patient discharged from?  [place]   Does the patient have one of the following disease processes/diagnoses(primary or secondary)?  Other   Week 2 attempt successful?  No   Unsuccessful attempts  Attempt 1          Heather Velasquez RN

## 2019-02-14 ENCOUNTER — READMISSION MANAGEMENT (OUTPATIENT)
Dept: CALL CENTER | Facility: HOSPITAL | Age: 39
End: 2019-02-14

## 2019-02-14 NOTE — OUTREACH NOTE
Medical Week 2 Survey      Responses   Facility patient discharged from?  [facility]   Does the patient have one of the following disease processes/diagnoses(primary or secondary)?  Other   Week 2 attempt successful?  Yes   Call start time  1106   Discharge diagnosis  DKA   Rescheduled  Rescheduled-pt requested [Sister confirmed his phone number and states he is better]          Silvia Perdomo RN

## 2019-02-15 ENCOUNTER — READMISSION MANAGEMENT (OUTPATIENT)
Dept: CALL CENTER | Facility: HOSPITAL | Age: 39
End: 2019-02-15

## 2019-02-15 NOTE — OUTREACH NOTE
Medical Week 2 Survey      Responses   Facility patient discharged from?  [facility]   Does the patient have one of the following disease processes/diagnoses(primary or secondary)?  Other   Week 2 attempt successful?  Yes   Call start time  1632   Call end time  1634   Meds reviewed with patient/caregiver?  Yes   Is the patient taking all medications as directed (includes completed medication regime)?  Yes   Has the patient kept scheduled appointments due by today?  Yes   What is the patient's perception of their health status since discharge?  Improving   If the patient is a current smoker, are they able to teach back resources for cessation?  [id number]   Week 2 Call Completed?  Yes   Graduated  Yes   Did the patient feel the follow up calls were helpful during their recovery period?  Yes   Was the number of calls appropriate?  Yes   Wrap up additional comments   Blood sugars are running much better in the 100- to 200 range.          Lalitha Guillen, RN

## 2019-11-29 ENCOUNTER — HOSPITAL ENCOUNTER (INPATIENT)
Facility: HOSPITAL | Age: 39
LOS: 1 days | Discharge: LEFT BEFORE DISCHARGE | End: 2019-12-01
Attending: INTERNAL MEDICINE | Admitting: INTERNAL MEDICINE

## 2019-11-29 DIAGNOSIS — E10.10 DIABETIC KETOACIDOSIS WITHOUT COMA ASSOCIATED WITH TYPE 1 DIABETES MELLITUS (HCC): Primary | ICD-10-CM

## 2019-11-29 PROCEDURE — 99285 EMERGENCY DEPT VISIT HI MDM: CPT

## 2019-11-29 RX ORDER — SODIUM CHLORIDE 0.9 % (FLUSH) 0.9 %
10 SYRINGE (ML) INJECTION AS NEEDED
Status: DISCONTINUED | OUTPATIENT
Start: 2019-11-29 | End: 2019-12-01 | Stop reason: HOSPADM

## 2019-11-30 PROBLEM — F19.10 POLYSUBSTANCE ABUSE (HCC): Status: ACTIVE | Noted: 2019-11-30

## 2019-11-30 PROBLEM — E11.10 DIABETIC KETOACIDOSIS WITHOUT COMA ASSOCIATED WITH TYPE 2 DIABETES MELLITUS (HCC): Status: ACTIVE | Noted: 2019-01-29

## 2019-11-30 LAB
ACETONE BLD QL: ABNORMAL
ALBUMIN SERPL-MCNC: 4.5 G/DL (ref 3.5–5.2)
ALBUMIN/GLOB SERPL: 1.6 G/DL
ALP SERPL-CCNC: 156 U/L (ref 39–117)
ALT SERPL W P-5'-P-CCNC: 20 U/L (ref 1–41)
AMPHET+METHAMPHET UR QL: POSITIVE
AMPHETAMINES UR QL: POSITIVE
AMYLASE SERPL-CCNC: 57 U/L (ref 28–100)
ANION GAP SERPL CALCULATED.3IONS-SCNC: 11 MMOL/L (ref 5–15)
ANION GAP SERPL CALCULATED.3IONS-SCNC: 12 MMOL/L (ref 5–15)
ANION GAP SERPL CALCULATED.3IONS-SCNC: 25 MMOL/L (ref 5–15)
ARTERIAL PATENCY WRIST A: POSITIVE
AST SERPL-CCNC: 11 U/L (ref 1–40)
ATMOSPHERIC PRESS: 750 MMHG
BARBITURATES UR QL SCN: NEGATIVE
BASE EXCESS BLDA CALC-SCNC: -12.5 MMOL/L (ref 0–2)
BASOPHILS # BLD AUTO: 0.06 10*3/MM3 (ref 0–0.2)
BASOPHILS NFR BLD AUTO: 0.4 % (ref 0–1.5)
BDY SITE: ABNORMAL
BENZODIAZ UR QL SCN: NEGATIVE
BILIRUB SERPL-MCNC: 0.3 MG/DL (ref 0.2–1.2)
BILIRUB UR QL STRIP: ABNORMAL
BODY TEMPERATURE: 37 C
BUN BLD-MCNC: 28 MG/DL (ref 6–20)
BUN BLD-MCNC: 29 MG/DL (ref 6–20)
BUN BLD-MCNC: 37 MG/DL (ref 6–20)
BUN/CREAT SERPL: 24.5 (ref 7–25)
BUN/CREAT SERPL: 24.8 (ref 7–25)
BUN/CREAT SERPL: 27.5 (ref 7–25)
BUPRENORPHINE SERPL-MCNC: NEGATIVE NG/ML
CALCIUM SPEC-SCNC: 8.4 MG/DL (ref 8.6–10.5)
CALCIUM SPEC-SCNC: 8.7 MG/DL (ref 8.6–10.5)
CALCIUM SPEC-SCNC: 9.9 MG/DL (ref 8.6–10.5)
CANNABINOIDS SERPL QL: NEGATIVE
CHLORIDE SERPL-SCNC: 107 MMOL/L (ref 98–107)
CHLORIDE SERPL-SCNC: 107 MMOL/L (ref 98–107)
CHLORIDE SERPL-SCNC: 94 MMOL/L (ref 98–107)
CLARITY UR: CLEAR
CO2 SERPL-SCNC: 14 MMOL/L (ref 22–29)
CO2 SERPL-SCNC: 18 MMOL/L (ref 22–29)
CO2 SERPL-SCNC: 19 MMOL/L (ref 22–29)
COCAINE UR QL: NEGATIVE
COLOR UR: YELLOW
CREAT BLD-MCNC: 1.02 MG/DL (ref 0.76–1.27)
CREAT BLD-MCNC: 1.17 MG/DL (ref 0.76–1.27)
CREAT BLD-MCNC: 1.51 MG/DL (ref 0.76–1.27)
D-LACTATE SERPL-SCNC: 1 MMOL/L (ref 0.5–2)
D-LACTATE SERPL-SCNC: 3.2 MMOL/L (ref 0.5–2)
DEPRECATED RDW RBC AUTO: 37.8 FL (ref 37–54)
EOSINOPHIL # BLD AUTO: 0 10*3/MM3 (ref 0–0.4)
EOSINOPHIL NFR BLD AUTO: 0 % (ref 0.3–6.2)
ERYTHROCYTE [DISTWIDTH] IN BLOOD BY AUTOMATED COUNT: 11.9 % (ref 12.3–15.4)
ETHANOL UR QL: <0.01 %
GFR SERPL CREATININE-BSD FRML MDRD: 51 ML/MIN/1.73
GFR SERPL CREATININE-BSD FRML MDRD: 69 ML/MIN/1.73
GFR SERPL CREATININE-BSD FRML MDRD: 80 ML/MIN/1.73
GLOBULIN UR ELPH-MCNC: 2.9 GM/DL
GLUCOSE BLD-MCNC: 134 MG/DL (ref 65–99)
GLUCOSE BLD-MCNC: 221 MG/DL (ref 65–99)
GLUCOSE BLD-MCNC: 530 MG/DL (ref 65–99)
GLUCOSE BLDC GLUCOMTR-MCNC: 127 MG/DL (ref 70–130)
GLUCOSE BLDC GLUCOMTR-MCNC: 128 MG/DL (ref 70–130)
GLUCOSE BLDC GLUCOMTR-MCNC: 138 MG/DL (ref 70–130)
GLUCOSE BLDC GLUCOMTR-MCNC: 163 MG/DL (ref 70–130)
GLUCOSE BLDC GLUCOMTR-MCNC: 173 MG/DL (ref 70–130)
GLUCOSE BLDC GLUCOMTR-MCNC: 181 MG/DL (ref 70–130)
GLUCOSE BLDC GLUCOMTR-MCNC: 255 MG/DL (ref 70–130)
GLUCOSE BLDC GLUCOMTR-MCNC: 310 MG/DL (ref 70–130)
GLUCOSE BLDC GLUCOMTR-MCNC: 381 MG/DL (ref 70–130)
GLUCOSE UR STRIP-MCNC: ABNORMAL MG/DL
HBA1C MFR BLD: 13.2 % (ref 4.8–5.6)
HCO3 BLDA-SCNC: 12 MMOL/L (ref 20–26)
HCT VFR BLD AUTO: 35 % (ref 37.5–51)
HGB BLD-MCNC: 12.3 G/DL (ref 13–17.7)
HGB UR QL STRIP.AUTO: NEGATIVE
HOLD SPECIMEN: NORMAL
IMM GRANULOCYTES # BLD AUTO: 0.07 10*3/MM3 (ref 0–0.05)
IMM GRANULOCYTES NFR BLD AUTO: 0.5 % (ref 0–0.5)
KETONES UR QL STRIP: ABNORMAL
LEUKOCYTE ESTERASE UR QL STRIP.AUTO: NEGATIVE
LIPASE SERPL-CCNC: 80 U/L (ref 13–60)
LYMPHOCYTES # BLD AUTO: 1.59 10*3/MM3 (ref 0.7–3.1)
LYMPHOCYTES NFR BLD AUTO: 10.9 % (ref 19.6–45.3)
Lab: ABNORMAL
MAGNESIUM SERPL-MCNC: 2.4 MG/DL (ref 1.6–2.6)
MCH RBC QN AUTO: 30.4 PG (ref 26.6–33)
MCHC RBC AUTO-ENTMCNC: 35.1 G/DL (ref 31.5–35.7)
MCV RBC AUTO: 86.4 FL (ref 79–97)
METHADONE UR QL SCN: NEGATIVE
MODALITY: ABNORMAL
MONOCYTES # BLD AUTO: 0.89 10*3/MM3 (ref 0.1–0.9)
MONOCYTES NFR BLD AUTO: 6.1 % (ref 5–12)
NEUTROPHILS # BLD AUTO: 11.93 10*3/MM3 (ref 1.7–7)
NEUTROPHILS NFR BLD AUTO: 82.1 % (ref 42.7–76)
NITRITE UR QL STRIP: NEGATIVE
NRBC BLD AUTO-RTO: 0 /100 WBC (ref 0–0.2)
OPIATES UR QL: NEGATIVE
OSMOLALITY SERPL: 324 MOSM/KG (ref 289–308)
OXYCODONE UR QL SCN: NEGATIVE
PCO2 BLDA: 23.9 MM HG (ref 35–45)
PCP UR QL SCN: NEGATIVE
PH BLDA: 7.31 PH UNITS (ref 7.35–7.45)
PH UR STRIP.AUTO: 5.5 [PH] (ref 5–8)
PHOSPHATE SERPL-MCNC: 2 MG/DL (ref 2.5–4.5)
PHOSPHATE SERPL-MCNC: 2.2 MG/DL (ref 2.5–4.5)
PHOSPHATE SERPL-MCNC: 4.5 MG/DL (ref 2.5–4.5)
PLATELET # BLD AUTO: 284 10*3/MM3 (ref 140–450)
PMV BLD AUTO: 10.7 FL (ref 6–12)
PO2 BLDA: 109 MM HG (ref 83–108)
POTASSIUM BLD-SCNC: 4.1 MMOL/L (ref 3.5–5.2)
POTASSIUM BLD-SCNC: 4.1 MMOL/L (ref 3.5–5.2)
POTASSIUM BLD-SCNC: 4.9 MMOL/L (ref 3.5–5.2)
PROPOXYPH UR QL: NEGATIVE
PROT SERPL-MCNC: 7.4 G/DL (ref 6–8.5)
PROT UR QL STRIP: NEGATIVE
RBC # BLD AUTO: 4.05 10*6/MM3 (ref 4.14–5.8)
SAO2 % BLDCOA: 98.6 % (ref 94–99)
SODIUM BLD-SCNC: 133 MMOL/L (ref 136–145)
SODIUM BLD-SCNC: 137 MMOL/L (ref 136–145)
SODIUM BLD-SCNC: 137 MMOL/L (ref 136–145)
SP GR UR STRIP: 1.02 (ref 1–1.03)
TRICYCLICS UR QL SCN: NEGATIVE
TROPONIN T SERPL-MCNC: <0.01 NG/ML (ref 0–0.03)
UROBILINOGEN UR QL STRIP: ABNORMAL
VENTILATOR MODE: ABNORMAL
WBC NRBC COR # BLD: 14.54 10*3/MM3 (ref 3.4–10.8)

## 2019-11-30 PROCEDURE — 82962 GLUCOSE BLOOD TEST: CPT

## 2019-11-30 PROCEDURE — 83605 ASSAY OF LACTIC ACID: CPT | Performed by: PHYSICIAN ASSISTANT

## 2019-11-30 PROCEDURE — 80307 DRUG TEST PRSMV CHEM ANLYZR: CPT | Performed by: PHYSICIAN ASSISTANT

## 2019-11-30 PROCEDURE — 84484 ASSAY OF TROPONIN QUANT: CPT | Performed by: EMERGENCY MEDICINE

## 2019-11-30 PROCEDURE — 93010 ELECTROCARDIOGRAM REPORT: CPT | Performed by: INTERNAL MEDICINE

## 2019-11-30 PROCEDURE — 83930 ASSAY OF BLOOD OSMOLALITY: CPT | Performed by: PHYSICIAN ASSISTANT

## 2019-11-30 PROCEDURE — 93005 ELECTROCARDIOGRAM TRACING: CPT | Performed by: EMERGENCY MEDICINE

## 2019-11-30 PROCEDURE — 85025 COMPLETE CBC W/AUTO DIFF WBC: CPT | Performed by: PHYSICIAN ASSISTANT

## 2019-11-30 PROCEDURE — 63710000001 INSULIN ASPART PER 5 UNITS: Performed by: INTERNAL MEDICINE

## 2019-11-30 PROCEDURE — 63710000001 INSULIN LISPRO (HUMAN) PER 5 UNITS: Performed by: INTERNAL MEDICINE

## 2019-11-30 PROCEDURE — 83735 ASSAY OF MAGNESIUM: CPT | Performed by: PHYSICIAN ASSISTANT

## 2019-11-30 PROCEDURE — 82803 BLOOD GASES ANY COMBINATION: CPT

## 2019-11-30 PROCEDURE — 83036 HEMOGLOBIN GLYCOSYLATED A1C: CPT | Performed by: EMERGENCY MEDICINE

## 2019-11-30 PROCEDURE — 83690 ASSAY OF LIPASE: CPT | Performed by: PHYSICIAN ASSISTANT

## 2019-11-30 PROCEDURE — 81003 URINALYSIS AUTO W/O SCOPE: CPT | Performed by: EMERGENCY MEDICINE

## 2019-11-30 PROCEDURE — 25010000002 ENOXAPARIN PER 10 MG: Performed by: INTERNAL MEDICINE

## 2019-11-30 PROCEDURE — 36600 WITHDRAWAL OF ARTERIAL BLOOD: CPT

## 2019-11-30 PROCEDURE — 82009 KETONE BODYS QUAL: CPT | Performed by: PHYSICIAN ASSISTANT

## 2019-11-30 PROCEDURE — 36415 COLL VENOUS BLD VENIPUNCTURE: CPT | Performed by: PHYSICIAN ASSISTANT

## 2019-11-30 PROCEDURE — 82150 ASSAY OF AMYLASE: CPT | Performed by: PHYSICIAN ASSISTANT

## 2019-11-30 PROCEDURE — 63710000001 INSULIN DETEMIR PER 5 UNITS: Performed by: INTERNAL MEDICINE

## 2019-11-30 PROCEDURE — 87040 BLOOD CULTURE FOR BACTERIA: CPT | Performed by: EMERGENCY MEDICINE

## 2019-11-30 PROCEDURE — 80053 COMPREHEN METABOLIC PANEL: CPT | Performed by: PHYSICIAN ASSISTANT

## 2019-11-30 PROCEDURE — 84100 ASSAY OF PHOSPHORUS: CPT | Performed by: EMERGENCY MEDICINE

## 2019-11-30 RX ORDER — LORAZEPAM 2 MG/ML
1 INJECTION INTRAMUSCULAR
Status: DISCONTINUED | OUTPATIENT
Start: 2019-11-30 | End: 2019-12-01 | Stop reason: HOSPADM

## 2019-11-30 RX ORDER — LORAZEPAM 2 MG/ML
4 INJECTION INTRAMUSCULAR
Status: DISCONTINUED | OUTPATIENT
Start: 2019-11-30 | End: 2019-12-01 | Stop reason: HOSPADM

## 2019-11-30 RX ORDER — ACETAMINOPHEN 650 MG/1
650 SUPPOSITORY RECTAL EVERY 4 HOURS PRN
Status: DISCONTINUED | OUTPATIENT
Start: 2019-11-30 | End: 2019-12-01 | Stop reason: HOSPADM

## 2019-11-30 RX ORDER — DEXTROSE, SODIUM CHLORIDE, AND POTASSIUM CHLORIDE 5; .45; .3 G/100ML; G/100ML; G/100ML
250 INJECTION INTRAVENOUS CONTINUOUS PRN
Status: DISCONTINUED | OUTPATIENT
Start: 2019-11-30 | End: 2019-11-30

## 2019-11-30 RX ORDER — SODIUM CHLORIDE 0.9 % (FLUSH) 0.9 %
10 SYRINGE (ML) INJECTION EVERY 12 HOURS SCHEDULED
Status: DISCONTINUED | OUTPATIENT
Start: 2019-11-30 | End: 2019-12-01 | Stop reason: HOSPADM

## 2019-11-30 RX ORDER — SODIUM CHLORIDE 0.9 % (FLUSH) 0.9 %
10 SYRINGE (ML) INJECTION ONCE AS NEEDED
Status: DISCONTINUED | OUTPATIENT
Start: 2019-11-30 | End: 2019-11-30

## 2019-11-30 RX ORDER — ACETAMINOPHEN 325 MG/1
650 TABLET ORAL EVERY 4 HOURS PRN
Status: DISCONTINUED | OUTPATIENT
Start: 2019-11-30 | End: 2019-12-01 | Stop reason: HOSPADM

## 2019-11-30 RX ORDER — SODIUM CHLORIDE AND POTASSIUM CHLORIDE 300; 900 MG/100ML; MG/100ML
250 INJECTION, SOLUTION INTRAVENOUS CONTINUOUS PRN
Status: DISCONTINUED | OUTPATIENT
Start: 2019-11-30 | End: 2019-11-30

## 2019-11-30 RX ORDER — LORAZEPAM 1 MG/1
1 TABLET ORAL
Status: DISCONTINUED | OUTPATIENT
Start: 2019-11-30 | End: 2019-12-01 | Stop reason: HOSPADM

## 2019-11-30 RX ORDER — NICOTINE POLACRILEX 4 MG
15 LOZENGE BUCCAL
Status: DISCONTINUED | OUTPATIENT
Start: 2019-11-30 | End: 2019-12-01 | Stop reason: HOSPADM

## 2019-11-30 RX ORDER — IPRATROPIUM BROMIDE AND ALBUTEROL SULFATE 2.5; .5 MG/3ML; MG/3ML
3 SOLUTION RESPIRATORY (INHALATION) EVERY 6 HOURS PRN
Status: DISCONTINUED | OUTPATIENT
Start: 2019-11-30 | End: 2019-12-01 | Stop reason: HOSPADM

## 2019-11-30 RX ORDER — LORAZEPAM 2 MG/ML
2 INJECTION INTRAMUSCULAR
Status: DISCONTINUED | OUTPATIENT
Start: 2019-11-30 | End: 2019-12-01 | Stop reason: HOSPADM

## 2019-11-30 RX ORDER — SODIUM CHLORIDE 9 MG/ML
250 INJECTION, SOLUTION INTRAVENOUS CONTINUOUS PRN
Status: DISCONTINUED | OUTPATIENT
Start: 2019-11-30 | End: 2019-11-30

## 2019-11-30 RX ORDER — INSULIN GLARGINE 100 [IU]/ML
20 INJECTION, SOLUTION SUBCUTANEOUS 2 TIMES DAILY
COMMUNITY

## 2019-11-30 RX ORDER — NICOTINE 21 MG/24HR
1 PATCH, TRANSDERMAL 24 HOURS TRANSDERMAL
Status: DISCONTINUED | OUTPATIENT
Start: 2019-11-30 | End: 2019-12-01 | Stop reason: HOSPADM

## 2019-11-30 RX ORDER — SODIUM CHLORIDE 9 MG/ML
75 INJECTION, SOLUTION INTRAVENOUS CONTINUOUS
Status: DISCONTINUED | OUTPATIENT
Start: 2019-11-30 | End: 2019-12-01 | Stop reason: HOSPADM

## 2019-11-30 RX ORDER — LORAZEPAM 1 MG/1
2 TABLET ORAL
Status: DISCONTINUED | OUTPATIENT
Start: 2019-11-30 | End: 2019-12-01 | Stop reason: HOSPADM

## 2019-11-30 RX ORDER — DEXTROSE AND SODIUM CHLORIDE 5; .45 G/100ML; G/100ML
250 INJECTION, SOLUTION INTRAVENOUS CONTINUOUS PRN
Status: DISCONTINUED | OUTPATIENT
Start: 2019-11-30 | End: 2019-11-30

## 2019-11-30 RX ORDER — ONDANSETRON 2 MG/ML
4 INJECTION INTRAMUSCULAR; INTRAVENOUS EVERY 6 HOURS PRN
Status: DISCONTINUED | OUTPATIENT
Start: 2019-11-30 | End: 2019-12-01 | Stop reason: HOSPADM

## 2019-11-30 RX ORDER — SODIUM CHLORIDE 9 MG/ML
10 INJECTION, SOLUTION INTRAVENOUS CONTINUOUS PRN
Status: DISCONTINUED | OUTPATIENT
Start: 2019-11-30 | End: 2019-11-30

## 2019-11-30 RX ORDER — SODIUM CHLORIDE AND POTASSIUM CHLORIDE 150; 900 MG/100ML; MG/100ML
250 INJECTION, SOLUTION INTRAVENOUS CONTINUOUS PRN
Status: DISCONTINUED | OUTPATIENT
Start: 2019-11-30 | End: 2019-11-30

## 2019-11-30 RX ORDER — INSULIN ASPART 100 [IU]/ML
5 INJECTION, SUSPENSION SUBCUTANEOUS 2 TIMES DAILY WITH MEALS
Status: DISCONTINUED | OUTPATIENT
Start: 2019-11-30 | End: 2019-11-30

## 2019-11-30 RX ORDER — LORAZEPAM 1 MG/1
4 TABLET ORAL
Status: DISCONTINUED | OUTPATIENT
Start: 2019-11-30 | End: 2019-12-01 | Stop reason: HOSPADM

## 2019-11-30 RX ORDER — ACETAMINOPHEN 325 MG/1
650 TABLET ORAL EVERY 6 HOURS PRN
Status: DISCONTINUED | OUTPATIENT
Start: 2019-11-30 | End: 2019-12-01 | Stop reason: HOSPADM

## 2019-11-30 RX ORDER — SODIUM CHLORIDE 450 MG/100ML
250 INJECTION, SOLUTION INTRAVENOUS CONTINUOUS PRN
Status: DISCONTINUED | OUTPATIENT
Start: 2019-11-30 | End: 2019-11-30

## 2019-11-30 RX ORDER — DEXTROSE MONOHYDRATE 25 G/50ML
25 INJECTION, SOLUTION INTRAVENOUS
Status: DISCONTINUED | OUTPATIENT
Start: 2019-11-30 | End: 2019-11-30

## 2019-11-30 RX ORDER — DEXTROSE MONOHYDRATE 25 G/50ML
12.5 INJECTION, SOLUTION INTRAVENOUS AS NEEDED
Status: DISCONTINUED | OUTPATIENT
Start: 2019-11-30 | End: 2019-11-30

## 2019-11-30 RX ORDER — DEXTROSE, SODIUM CHLORIDE, AND POTASSIUM CHLORIDE 5; .45; .15 G/100ML; G/100ML; G/100ML
250 INJECTION INTRAVENOUS CONTINUOUS PRN
Status: DISCONTINUED | OUTPATIENT
Start: 2019-11-30 | End: 2019-11-30

## 2019-11-30 RX ORDER — SODIUM CHLORIDE AND POTASSIUM CHLORIDE 150; 450 MG/100ML; MG/100ML
250 INJECTION, SOLUTION INTRAVENOUS CONTINUOUS PRN
Status: DISCONTINUED | OUTPATIENT
Start: 2019-11-30 | End: 2019-11-30

## 2019-11-30 RX ORDER — ONDANSETRON 4 MG/1
4 TABLET, FILM COATED ORAL EVERY 6 HOURS PRN
Status: DISCONTINUED | OUTPATIENT
Start: 2019-11-30 | End: 2019-12-01 | Stop reason: HOSPADM

## 2019-11-30 RX ORDER — SODIUM CHLORIDE 0.9 % (FLUSH) 0.9 %
10 SYRINGE (ML) INJECTION AS NEEDED
Status: DISCONTINUED | OUTPATIENT
Start: 2019-11-30 | End: 2019-12-01 | Stop reason: HOSPADM

## 2019-11-30 RX ORDER — VENLAFAXINE HYDROCHLORIDE 75 MG/1
75 CAPSULE, EXTENDED RELEASE ORAL DAILY
Status: DISCONTINUED | OUTPATIENT
Start: 2019-11-30 | End: 2019-12-01 | Stop reason: HOSPADM

## 2019-11-30 RX ADMIN — INSULIN ASPART 5 UNITS: 100 INJECTION, SUSPENSION SUBCUTANEOUS at 06:48

## 2019-11-30 RX ADMIN — SODIUM CHLORIDE 2000 ML: 9 INJECTION, SOLUTION INTRAVENOUS at 01:43

## 2019-11-30 RX ADMIN — SODIUM CHLORIDE 1000 ML: 9 INJECTION, SOLUTION INTRAVENOUS at 00:55

## 2019-11-30 RX ADMIN — METFORMIN HYDROCHLORIDE 1000 MG: 500 TABLET ORAL at 12:24

## 2019-11-30 RX ADMIN — SODIUM CHLORIDE, PRESERVATIVE FREE 10 ML: 5 INJECTION INTRAVENOUS at 21:19

## 2019-11-30 RX ADMIN — INSULIN LISPRO 2 UNITS: 100 INJECTION, SOLUTION INTRAVENOUS; SUBCUTANEOUS at 12:30

## 2019-11-30 RX ADMIN — METFORMIN HYDROCHLORIDE 1000 MG: 500 TABLET ORAL at 17:20

## 2019-11-30 RX ADMIN — SODIUM CHLORIDE 75 ML/HR: 9 INJECTION, SOLUTION INTRAVENOUS at 08:25

## 2019-11-30 RX ADMIN — ENOXAPARIN SODIUM 40 MG: 40 INJECTION SUBCUTANEOUS at 12:31

## 2019-11-30 RX ADMIN — INSULIN LISPRO 2 UNITS: 100 INJECTION, SOLUTION INTRAVENOUS; SUBCUTANEOUS at 17:23

## 2019-11-30 RX ADMIN — INSULIN DETEMIR 10 UNITS: 100 INJECTION, SOLUTION SUBCUTANEOUS at 08:17

## 2019-11-30 RX ADMIN — SODIUM CHLORIDE 75 ML/HR: 9 INJECTION, SOLUTION INTRAVENOUS at 21:19

## 2019-11-30 RX ADMIN — POTASSIUM PHOSPHATE, MONOBASIC AND POTASSIUM PHOSPHATE, DIBASIC 15 MMOL: 224; 236 INJECTION, SOLUTION, CONCENTRATE INTRAVENOUS at 09:46

## 2019-11-30 RX ADMIN — INSULIN LISPRO 6 UNITS: 100 INJECTION, SOLUTION INTRAVENOUS; SUBCUTANEOUS at 21:16

## 2019-11-30 RX ADMIN — POTASSIUM CHLORIDE, DEXTROSE MONOHYDRATE AND SODIUM CHLORIDE 250 ML/HR: 150; 5; 450 INJECTION, SOLUTION INTRAVENOUS at 06:36

## 2019-12-01 VITALS
HEART RATE: 79 BPM | SYSTOLIC BLOOD PRESSURE: 118 MMHG | TEMPERATURE: 98.3 F | DIASTOLIC BLOOD PRESSURE: 70 MMHG | RESPIRATION RATE: 16 BRPM | WEIGHT: 173 LBS | OXYGEN SATURATION: 97 % | HEIGHT: 72 IN | BODY MASS INDEX: 23.43 KG/M2

## 2019-12-01 LAB
ALBUMIN SERPL-MCNC: 3.5 G/DL (ref 3.5–5.2)
ALBUMIN/GLOB SERPL: 1.7 G/DL
ALP SERPL-CCNC: 107 U/L (ref 39–117)
ALT SERPL W P-5'-P-CCNC: 12 U/L (ref 1–41)
ANION GAP SERPL CALCULATED.3IONS-SCNC: 9 MMOL/L (ref 5–15)
AST SERPL-CCNC: 13 U/L (ref 1–40)
BILIRUB SERPL-MCNC: 0.4 MG/DL (ref 0.2–1.2)
BUN BLD-MCNC: 16 MG/DL (ref 6–20)
BUN/CREAT SERPL: 22.2 (ref 7–25)
CALCIUM SPEC-SCNC: 8.6 MG/DL (ref 8.6–10.5)
CHLORIDE SERPL-SCNC: 107 MMOL/L (ref 98–107)
CO2 SERPL-SCNC: 23 MMOL/L (ref 22–29)
CREAT BLD-MCNC: 0.72 MG/DL (ref 0.76–1.27)
DEPRECATED RDW RBC AUTO: 38.8 FL (ref 37–54)
ERYTHROCYTE [DISTWIDTH] IN BLOOD BY AUTOMATED COUNT: 12.4 % (ref 12.3–15.4)
GFR SERPL CREATININE-BSD FRML MDRD: 120 ML/MIN/1.73
GLOBULIN UR ELPH-MCNC: 2.1 GM/DL
GLUCOSE BLD-MCNC: 132 MG/DL (ref 65–99)
GLUCOSE BLDC GLUCOMTR-MCNC: 283 MG/DL (ref 70–130)
HCT VFR BLD AUTO: 31.3 % (ref 37.5–51)
HGB BLD-MCNC: 11 G/DL (ref 13–17.7)
MAGNESIUM SERPL-MCNC: 1.7 MG/DL (ref 1.6–2.6)
MCH RBC QN AUTO: 30.1 PG (ref 26.6–33)
MCHC RBC AUTO-ENTMCNC: 35.1 G/DL (ref 31.5–35.7)
MCV RBC AUTO: 85.8 FL (ref 79–97)
PHOSPHATE SERPL-MCNC: 2.4 MG/DL (ref 2.5–4.5)
PLATELET # BLD AUTO: 216 10*3/MM3 (ref 140–450)
PMV BLD AUTO: 10.8 FL (ref 6–12)
POTASSIUM BLD-SCNC: 3.9 MMOL/L (ref 3.5–5.2)
PROT SERPL-MCNC: 5.6 G/DL (ref 6–8.5)
RBC # BLD AUTO: 3.65 10*6/MM3 (ref 4.14–5.8)
SODIUM BLD-SCNC: 139 MMOL/L (ref 136–145)
WBC NRBC COR # BLD: 8.5 10*3/MM3 (ref 3.4–10.8)

## 2019-12-01 PROCEDURE — 36415 COLL VENOUS BLD VENIPUNCTURE: CPT | Performed by: INTERNAL MEDICINE

## 2019-12-01 PROCEDURE — 80053 COMPREHEN METABOLIC PANEL: CPT | Performed by: INTERNAL MEDICINE

## 2019-12-01 PROCEDURE — 84100 ASSAY OF PHOSPHORUS: CPT | Performed by: INTERNAL MEDICINE

## 2019-12-01 PROCEDURE — 85027 COMPLETE CBC AUTOMATED: CPT | Performed by: INTERNAL MEDICINE

## 2019-12-01 PROCEDURE — 83735 ASSAY OF MAGNESIUM: CPT | Performed by: INTERNAL MEDICINE

## 2019-12-01 PROCEDURE — 63710000001 INSULIN LISPRO (HUMAN) PER 5 UNITS: Performed by: INTERNAL MEDICINE

## 2019-12-01 PROCEDURE — 82962 GLUCOSE BLOOD TEST: CPT

## 2019-12-01 RX ADMIN — METFORMIN HYDROCHLORIDE 1000 MG: 500 TABLET ORAL at 08:45

## 2019-12-01 RX ADMIN — INSULIN LISPRO 6 UNITS: 100 INJECTION, SOLUTION INTRAVENOUS; SUBCUTANEOUS at 08:45

## 2019-12-01 NOTE — DISCHARGE SUMMARY
The patient has been difficult with the nursing staff this morning.  I have spoken with Nery on 2 separate occasions.  He has been leaving the floor frequently to smoke.  He has been refusing medications and has also removed his IV on his own.    He wants to sign out AGAINST MEDICAL ADVICE at this point in time.  The patient has done this in the past.  He signed out of the emergency department in January of this year when I was trying to admit him and then ultimately did come back and was ultimately admitted.  However, he then told me later in the hospitalization to discharge him immediately or he would sign out.    He needs to follow with his primary care provider later this week.    Kraig Disla, DO  12/01/19  12:06 PM

## 2019-12-01 NOTE — PROGRESS NOTES
"Discharge Planning Assessment  [facility]     Patient Name: Vince Aguilar  MRN: [id number]  Today's Date: 12/1/2019    Admit Date: 11/29/2019    Discharge Needs Assessment     Row Name 12/01/19 0724       Living Environment    Lives With  parent(s)    Current Living Arrangements  home/apartment/condo    Primary Care Provided by  self    Provides Primary Care For  no one    Family Caregiver if Needed  parent(s)    Quality of Family Relationships  helpful;involved;supportive    Able to Return to Prior Arrangements  yes       Resource/Environmental Concerns    Resource/Environmental Concerns  none    Transportation Concerns  car, none       Transition Planning    Patient/Family Anticipates Transition to  home with family    Patient/Family Anticipated Services at Transition  none    Transportation Anticipated  family or friend will provide       Discharge Needs Assessment    Readmission Within the Last 30 Days  no previous admission in last 30 days    Concerns to be Addressed  no discharge needs identified;denies needs/concerns at this time    Equipment Currently Used at Home  none    Anticipated Changes Related to Illness  none    Equipment Needed After Discharge  none    Discharge Coordination/Progress  Pt has RX coverage and a PCP. SW was consulted due to pt requesting to speak with case management. SW contacted pt who states that he did not have any needs at this time. Pt lives at home with his mom and works \"off and on.\" SW will follow and assist with any needs that may arise.         Discharge Plan    No documentation.       Destination      No service coordination in this encounter.      Durable Medical Equipment      No service coordination in this encounter.      Dialysis/Infusion      No service coordination in this encounter.      Home Medical Care      No service coordination in this encounter.      Therapy      No service coordination in this encounter.      Community Resources      No service " coordination in this encounter.          Demographic Summary    No documentation.       Functional Status    No documentation.       Psychosocial    No documentation.       Abuse/Neglect    No documentation.       Legal    No documentation.       Substance Abuse    No documentation.       Patient Forms    No documentation.           Didi Arias

## 2019-12-01 NOTE — PLAN OF CARE
Problem: Patient Care Overview  Goal: Plan of Care Review  Outcome: Ongoing (interventions implemented as appropriate)   11/30/19 8855   Coping/Psychosocial   Plan of Care Reviewed With patient   Plan of Care Review   Progress improving   OTHER   Outcome Summary ACCUCHECKS WITH SS COVERAGE AVAILABLE. PT. UP AD RUBI. IV FLUIDS INFUSING. NO C/O'S PAIN. NO S/S OF DT'S NOTED. NO DSTRESS NOTED.      Goal: Discharge Needs Assessment  Outcome: Ongoing (interventions implemented as appropriate)      Problem: Diabetes, Type 2 (Adult)  Goal: Signs and Symptoms of Listed Potential Problems Will be Absent, Minimized or Managed (Diabetes, Type 2)  Outcome: Ongoing (interventions implemented as appropriate)

## 2019-12-01 NOTE — PLAN OF CARE
Problem: Patient Care Overview  Goal: Plan of Care Review  Outcome: Ongoing (interventions implemented as appropriate)   12/01/19 0346   Coping/Psychosocial   Plan of Care Reviewed With patient   Plan of Care Review   Progress no change   OTHER   Outcome Summary Pt is cooperative this shift; glu = 255; no c/o pain; no s/s of delerium tremors       Problem: Diabetes, Type 2 (Adult)  Goal: Signs and Symptoms of Listed Potential Problems Will be Absent, Minimized or Managed (Diabetes, Type 2)  Outcome: Ongoing (interventions implemented as appropriate)   11/30/19 0296   Goal/Outcome Evaluation   Problems Assessed (Type 2 Diabetes) all   Problems Present (Type 2 Diabetes) situational response

## 2019-12-02 NOTE — PAYOR COMM NOTE
"Vince Coelho (39 y.o. Male) [phone]  [facility] phone    Fax     Date of Birth Social Security Number Address Home Phone MRN    1980  [address] [address] [phone] [id number]    Christianity Marital Status          [facility] Single       Admission Date Admission Type Admitting Provider Attending Provider Department, Room/Bed    11/29/19 Emergency Kraig Disla, [facility] 3C, 379/1    Discharge Date Discharge Disposition Discharge Destination        12/1/2019 Left Against Medical Advice              Attending Provider:  (none)   Allergies:  No Known Allergies    Isolation:  None   Infection:  None   Code Status:  Prior    Ht:  182.9 cm (72\")   Wt:  78.5 kg (173 lb)    Admission Cmt:  None   Principal Problem:  Diabetic ketoacidosis without coma associated with type 2 diabetes mellitus (CMS/HCC) [E11.10]                 Active Insurance as of 11/29/2019     Primary Coverage     Payor Plan Insurance Group Employer/Plan Group    [employer] [id number]     Payor Plan Address Payor Plan Phone Number Payor Plan Fax Number Effective Dates    [address] [phone]  10/1/2015 - None Entered    [facility] [id number]       Subscriber Name Subscriber Birth Date Member ID       VINCE COELHO 1980 [id number]                 Emergency Contacts      (Rel.) Home Phone Work Phone Mobile Phone    JESI SANTAMARIA (Sister) [phone] -- --    LORA GALVEZ (Mother) [phone] -- --    BRANT CORONADO [phone] -- --               History & Physical      Bernard Moise MD at 11/30/19 0206              [facility] Medicine Services  HISTORY AND PHYSICAL    Date of Admission: 11/29/2019  Primary Care Physician: Tre Kimbrough APRN    Subjective     Chief Complaint: Elevated blood sugar    History of Present Illness  Patient is a 39-year-old white male " with a history of T[place] type 2 diabetes with tendency to have DKA.  He presents with a 2-day history of blood sugars going up higher and higher.  He also has had polyuria polydipsia.  Patient states that he has been drinking alcohol quite a bit over the past couple of days as well as using methamphetamine.  Last use of both was Thursday night.  Patient states this is not a chronic thing but something he just did on the spur of the moment.  He also has a history of type 2 diabetes he was hospitalized earlier this year with DKA for the first time he is on insulin.  Is also been having nausea vomiting and abdominal pain.  He denies any fevers or chills no significant cough or congestion.  He does smoke cigarettes.  He was evaluated in the ER tonight found to have a blood sugar of 530 and a pH of 7.309 with a bicarb of 12 his CO2 was 14 on his chemistries anion gap was elevated at 25.  He has small acetone in his blood white count 14.5 A1c was 13.2.  He is diagnosed with DKA patient states he is willing to be admitted for treatment.    Review of Systems   14 point review of systems negative except for as per HPI    Otherwise complete ROS reviewed and negative except as mentioned in the HPI.    Past Medical History:   Past Medical History:   Diagnosis Date   • Diabetes mellitus (CMS/[facility])      Past Surgical History:History reviewed. No pertinent surgical history.  Social History:  reports that he has been smoking cigarettes.  He has a 22.50 pack-year smoking history. He has never used smokeless tobacco. He reports that he drinks alcohol. He reports that he does not use drugs.    Family History:   Patient states he does not know anything about his family history    Allergies:  No Known Allergies  Medications:  Prior to Admission medications    Medication Sig Start Date End Date Taking? Authorizing Provider   glucose blood test strip Test three times daily. Record results and report to primary care. 1/31/19   Kraig Disla  "NAEL, DO   glucose monitor monitoring kit 1 each As Needed (diabetic monitoring). 1/31/19   Kraig Disla DO   Insulin Glargine (BASAGLAR KWIKPEN) 100 UNIT/ML injection pen Inject 25 Units under the skin into the appropriate area as directed Every Night. 1/31/19   Kraig Disla DO   Insulin Pen Needle (PEN NEEDLES) 30G X 5 MM misc 1 dose Every Night. 1/31/19   Kraig Disla DO   Lancets 30G misc 1 stick 3 (Three) Times a Day. 1/31/19   Kraig Disla,    metFORMIN (GLUCOPHAGE) 1000 MG tablet Take 1 tablet by mouth 2 (Two) Times a Day With Meals. 1/31/19   Kraig Disla DO   venlafaxine XR (EFFEXOR-XR) 75 MG 24 hr capsule Take 75 mg by mouth Daily.    Provider, MD Mansoor     Objective     Vital Signs: /55   Pulse 102   Temp 98.5 °F (36.9 °C) (Oral)   Resp 20   Ht 182.9 cm (72\")   Wt 78.5 kg (173 lb)   SpO2 99%   BMI 23.46 kg/m²    Physical Exam  Gen.:  Well-nourished well-developed white male in no acute distress  HEENT: Atraumatic, normocephalic.  Pupils equal, round, and reactive to light. Extraocular movements intact.  Sclerae anicteric.  TMs negative.  Mucous membranes moist.  Neck is supple without lymphadenopathy.  No JVD is noted.  No carotid bruits are auscultated.  Oropharynx is without erythema or exudate.   Chest: Occasional wheezes  CV: Regular rate and rhythm.  Normal S1-S2.  No gallops, murmurs. or rubs.  Abdomen: Soft, nontender, nondistended.  Active bowel sounds,  No hepatosplenomegaly.  No masses.  No hernias.  Extremities: No clubbing, edema, or cyanosis.  Capillary refill is normal.  Pulses are 2+ and symmetric at radial and dorsalis pedis.  Posterior tibial pulses are intact and 2+ palpable.  Neuro: Patient is awake, alert, and oriented ×3.  Cranial nerves II through XII are grossly intact.  Motor is 5 out of 5 bilaterally.  DTRs are 2+ and symmetric bilaterally. Sensory exam is nonfocal  Skin: Warm, dry, and intact.  No evidence of breakdown.  Sensorium: Normal " thought and affect    Nursing notes and vital signs reviewed        Results Reviewed:  Lab Results (last 24 hours)     Procedure Component Value Units Date/Time    Hemoglobin A1c [[id number]]  (Abnormal) Collected:  11/30/19 0045    Specimen:  Blood Updated:  11/30/19 0149     Hemoglobin A1C 13.20 %     Narrative:       Hemoglobin A1C Ranges:    Increased Risk for Diabetes  5.7% to 6.4%  Diabetes                     >= 6.5%  Diabetic Goal                < 7.0%    Phosphorus [[id number]]  (Normal) Collected:  11/30/19 0045    Specimen:  Blood Updated:  11/30/19 0148     Phosphorus 4.5 mg/dL     Troponin [[id number]]  (Normal) Collected:  11/30/19 0045    Specimen:  Blood Updated:  11/30/19 0148     Troponin T <0.010 ng/mL     Narrative:       Troponin T Reference Range:  <= 0.03 ng/mL-   Negative for AMI  >0.03 ng/mL-     Abnormal for myocardial necrosis.  Clinicians would have to utilize clinical acumen, EKG, Troponin and serial changes to determine if it is an Acute Myocardial Infarction or myocardial injury due to an underlying chronic condition.     Comprehensive Metabolic Panel [[id number]]  (Abnormal) Collected:  11/30/19 0045    Specimen:  Blood Updated:  11/30/19 0118     Glucose 530 mg/dL      BUN 37 mg/dL      Creatinine 1.51 mg/dL      Sodium 133 mmol/L      Potassium 4.9 mmol/L      Chloride 94 mmol/L      CO2 14.0 mmol/L      Calcium 9.9 mg/dL      Total Protein 7.4 g/dL      Albumin 4.50 g/dL      ALT (SGPT) 20 U/L      AST (SGOT) 11 U/L      Comment: Specimen hemolyzed.  Results may be affected.        Alkaline Phosphatase 156 U/L      Total Bilirubin 0.3 mg/dL      eGFR Non African Amer 51 mL/min/1.73      Globulin 2.9 gm/dL      A/G Ratio 1.6 g/dL      BUN/Creatinine Ratio 24.5     Anion Gap 25.0 mmol/L     Narrative:       GFR Normal >60  Chronic Kidney Disease <60  Kidney Failure <15    Ketone Bodies, Serum (Not performed at [place]) [[id number]] Collected:  11/30/19 0045    Specimen:  Blood Updated:   11/30/19 0116    Narrative:       The following orders were created for panel order Ketone Bodies, Serum (Not performed at [place]).  Procedure                               Abnormality         Status                     ---------                               -----------         ------                     Acetone[[id number]]                      Abnormal            Final result                 Please view results for these tests on the individual orders.    Acetone [[id number]]  (Abnormal) Collected:  11/30/19 0045    Specimen:  Blood Updated:  11/30/19 0116     Acetone Small    Amylase [[id number]]  (Normal) Collected:  11/30/19 0045    Specimen:  Blood Updated:  11/30/19 0109     Amylase 57 U/L     Lipase [[id number]]  (Abnormal) Collected:  11/30/19 0045    Specimen:  Blood Updated:  11/30/19 0109     Lipase 80 U/L     Magnesium [[id number]]  (Normal) Collected:  11/30/19 0045    Specimen:  Blood Updated:  11/30/19 0109     Magnesium 2.4 mg/dL     Ethanol [[id number]] Collected:  11/30/19 0045    Specimen:  Blood Updated:  11/30/19 0109     Ethanol % <0.010 %     Narrative:       Not for legal purposes. Chain of Custody not followed.     CBC & Differential [[id number]] Collected:  11/30/19 0045    Specimen:  Blood Updated:  11/30/19 0052    Narrative:       The following orders were created for panel order CBC & Differential.  Procedure                               Abnormality         Status                     ---------                               -----------         ------                     CBC Auto Differential[[id number]]        Abnormal            Final result                 Please view results for these tests on the individual orders.    CBC Auto Differential [[id number]]  (Abnormal) Collected:  11/30/19 0045    Specimen:  Blood Updated:  11/30/19 0052     WBC 14.54 10*3/mm3      RBC 4.05 10*6/mm3      Hemoglobin 12.3 g/dL      Hematocrit 35.0 %      MCV 86.4 fL      MCH 30.4 pg      MCHC 35.1 g/dL      RDW  11.9 %      RDW-SD 37.8 fl      MPV 10.7 fL      Platelets 284 10*3/mm3      Neutrophil % 82.1 %      Lymphocyte % 10.9 %      Monocyte % 6.1 %      Eosinophil % 0.0 %      Basophil % 0.4 %      Immature Grans % 0.5 %      Neutrophils, Absolute 11.93 10*3/mm3      Lymphocytes, Absolute 1.59 10*3/mm3      Monocytes, Absolute 0.89 10*3/mm3      Eosinophils, Absolute 0.00 10*3/mm3      Basophils, Absolute 0.06 10*3/mm3      Immature Grans, Absolute 0.07 10*3/mm3      nRBC 0.0 /100 WBC     Osmolality, Serum [[id number]] Collected:  11/30/19 0045    Specimen:  Blood Updated:  11/30/19 0049    Lactic Acid, Plasma [[id number]]  (Abnormal) Collected:  11/30/19 0011    Specimen:  Blood Updated:  11/30/19 0044     Lactate 3.2 mmol/L     Lactic Acid, Reflex Timer (This will reflex a repeat order 3-3:15 hours after ordered.) [[id number]] Collected:  11/30/19 0011    Specimen:  Blood Updated:  11/30/19 0044    Blood Gas, Arterial [[id number]]  (Abnormal) Collected:  11/30/19 0014    Specimen:  Arterial Blood Updated:  11/30/19 0022     Site Right Radial     Patrick's Test Positive     pH, Arterial 7.309 pH units      Comment: 84 Value below reference range        pCO2, Arterial 23.9 mm Hg      Comment: 84 Value below reference range        pO2, Arterial 109.0 mm Hg      Comment: 83 Value above reference range        HCO3, Arterial 12.0 mmol/L      Comment: 84 Value below reference range        Base Excess, Arterial -12.5 mmol/L      Comment: 84 Value below reference range        O2 Saturation, Arterial 98.6 %      Temperature 37.0 C      Barometric Pressure for Blood Gas 750 mmHg      Modality Room Air     Ventilator Mode NA     Collected by [id number]     Comment: Meter: [id number]     :  [id number]           Imaging Results (Last 24 Hours)     ** No results found for the last 24 hours. **        I have personally reviewed and interpreted the radiology studies and ECG obtained at time of admission.     Assessment / Plan      Assessment:   Active Hospital Problems    Diagnosis   • Diabetic ketoacidosis without coma associated with type 1 diabetes mellitus (CMS/HCC)   • Volume depletion   • Tobacco abuse   • Lactic acidosis   • Acute renal failure (CMS/HCC)   1.  DKA type 2 diabetes patient plans to admit patient insulin drip is been initiated fluids have been bolused will perform DKA protocol and patient.  This will involve serial Accu-Cheks adjusting insulin drip as needed aggressive fluid rehydration.  Diabetes education is well.  2.  Abdominal pain nausea vomiting I believe is related to the DKA patient states is already improving with getting his sugars down as well as fluids symptomatic control.  3.  Tobacco abuse encourage smoking cessation nicotine patch.  4.  Polysubstance abuse counseled patient on need to refrain especially considering his current diabetes.  Will follow CIWA protocol PRN Ativan as needed.  Monitor for signs or symptoms of withdrawal.  5.  Lactic acidosis I do not believe he has any infection I believe is related to the DKA will trend repeat levels in a.m.  Fluids should correct this.  6.  Acute kidney injury related to dehydration and DKA fluid resuscitation monitor labs.  Patient seen after midnight         Code Status: Full     I discussed the patient's findings and my recommendations with the patient and Brooklyn Tucker his fiancée who along with Tl Reagan his sister are co-surrogate healthcare decision maker should he not build speak for himself.          Estimated length of stay 1 to 2 days    Bernard Moise MD   11/30/19   2:06 AM              Electronically signed by Bernard Moise MD at 11/30/19 5293          Emergency Department Notes      Nathan Rodriguez, RN at 11/29/19 0524        PATIENT PRESENTS WITH CC OF HYPERGLYCEMIA. PATIENT REPORTS HE IS A TYPE ONE DIABETIC AND HAS A HISTORY OF DKA.     Nathan Rodriguez, EVANGELIST  11/29/19 6648      Electronically signed by Nathan Rodriguez RN at 11/29/19  "[id number]     Johnson Lindsey PA-C at 11/30/19 8816     Attestation signed by Tyshawn Mejía MD at 11/30/19 1300          For this patient encounter, I reviewed the NP or PA documentation, treatment plan, and medical decision making. Tyshawn Mejía MD 11/30/2019 3:43 AM                  Subjective   History of Present Illness    Patient is a 39-year-old male presenting to ED with high blood sugar.  Patient stated he has a history of insulin-dependent diabetes.  Patient noted he recently follow-up with his primary care provider approximately 1 week ago at which time he was told his hemoglobin A1c was \"13 something.  But my sugars are usually around 300 so I am doing good.\"  2 days ago he developed nausea and vomiting after \"drinking for the first time in a very long time which I probably should have done.\"  Patient stated he is also been smoking meth, with his last use last night.  Patient stated since developing nausea and vomiting he can no longer keep anything down, including his oral diabetic medications.  Patient stated he is starting to feel \"like I got that DKA again.\"  Patient's wife at bedside reported patient has had numerous episodes of DKA the most recent of which she believes 3 and 6 months ago.  Wife is a poor historian and stated \"he just does things and I do not really know about them or know why.\"  Patient reports chills and sweats but denies any known fevers at home.  Patient denies any difficulties breathing, chest pain, focal weakness, or recent injuries.  Patient reports a generalized dull headache but denies any dizziness, lightheadedness, or episodes of syncope.    Records reviewed show patient was seen in ED on 01/21/2019 at which time he was diagnosed with diabeticKetoacidosis.  Patient left AMA from that visit and then was seen in ED again a couple hours later at which time he was admitted and stayed in the hospital until discharge on 01/31/2019.    Review of Systems "   Constitutional: Positive for appetite change (decreased), chills, diaphoresis and fatigue. Negative for fever.   HENT: Negative for congestion, sinus pressure and sore throat.    Respiratory: Negative.  Negative for cough, chest tightness and shortness of breath.    Cardiovascular: Negative.  Negative for chest pain.   Gastrointestinal: Positive for nausea and vomiting. Negative for abdominal pain, constipation and diarrhea.   Genitourinary: Positive for decreased urine volume. Negative for dysuria, flank pain and hematuria.   Musculoskeletal: Negative.  Negative for arthralgias and myalgias.   Skin: Negative.  Negative for rash and wound.   Neurological: Positive for headaches. Negative for dizziness, syncope, weakness and numbness.   All other systems reviewed and are negative.      Past Medical History:   Diagnosis Date   • Diabetes mellitus (CMS/[facility])        No Known Allergies    History reviewed. No pertinent surgical history.    History reviewed. No pertinent family history.    Social History     Socioeconomic History   • Marital status: Single     Spouse name: Not on file   • Number of children: Not on file   • Years of education: Not on file   • Highest education level: Not on file   Tobacco Use   • Smoking status: Current Every Day Smoker     Packs/day: 1.50     Years: 15.00     Pack years: 22.50     Types: Cigarettes   • Smokeless tobacco: Never Used   Substance and Sexual Activity   • Alcohol use: Yes     Comment: pt states drinks maybe 3 times a week   • Drug use: No   • Sexual activity: Defer           Objective   Physical Exam   Constitutional: He is oriented to person, place, and time. He appears well-developed and well-nourished. He is cooperative. He appears ill. He appears distressed (due to pain).   HENT:   Head: Normocephalic and atraumatic.   Right Ear: Tympanic membrane, external ear and ear canal normal.   Left Ear: Tympanic membrane, external ear and ear canal normal.   Nose: Nose normal.    Mouth/Throat: Uvula is midline and oropharynx is clear and moist. Mucous membranes are dry. No oropharyngeal exudate, posterior oropharyngeal edema or posterior oropharyngeal erythema.   Patient with extremely poor dentition with numerous dental caries and numerous fractured teeth   Eyes: Conjunctivae, EOM and lids are normal. Pupils are equal, round, and reactive to light. Right eye exhibits no discharge. Left eye exhibits no discharge. Right conjunctiva is not injected. Left conjunctiva is not injected. No scleral icterus. Right eye exhibits no nystagmus. Left eye exhibits no nystagmus.   Neck: Normal range of motion. Neck supple.   Cardiovascular: Normal rate, regular rhythm, normal heart sounds, intact distal pulses and normal pulses.   No murmur heard.  Pulses:       Radial pulses are 2+ on the right side, and 2+ on the left side.        Dorsalis pedis pulses are 2+ on the right side, and 2+ on the left side.        Posterior tibial pulses are 2+ on the right side, and 2+ on the left side.   Pulmonary/Chest: Effort normal and breath sounds normal. No respiratory distress. He has no decreased breath sounds. He has no wheezes. He has no rhonchi. He has no rales. He exhibits no bony tenderness.   Abdominal: Soft. Normal appearance and bowel sounds are normal. There is generalized tenderness. There is no guarding and no CVA tenderness.   Slight diffuse tenderness to palpation of entire abdomen with no specific point/localized tenderness   Musculoskeletal: Normal range of motion. He exhibits no edema or tenderness.        Cervical back: Normal. He exhibits normal range of motion, no bony tenderness and no spasm.        Thoracic back: Normal. He exhibits normal range of motion, no bony tenderness and no spasm.        Lumbar back: Normal. He exhibits normal range of motion, no bony tenderness and no spasm.   Neurological: He is alert and oriented to person, place, and time. He has normal strength. Gait normal.    Skin: Skin is warm and dry. Capillary refill takes less than 2 seconds. No rash noted. He is not diaphoretic.   Psychiatric: He has a normal mood and affect. His speech is normal and behavior is normal. Thought content normal.   Nursing note and vitals reviewed.      Procedures          ED Course  ED Course as of Nov 30 0333   Sat Nov 30, 2019   0015 Upon multiple re-evaluations patient is continuing to call out every time his wife leaves the bedside.  Patient starts eating until she comes back.  IV was able to be established and fluids are being administered with a pressure bag at this time.  [JS]   0101 Reviewed with Dr. Mejía patient's labs and DKA status based on his bicarb from his ABG.  Pending results of his potassium we will begin insulin treatment.  [JS]   0115 Patient sleeping very comfortably in bed at this time.  Reviewed with wife at bedside lab results, DKA, and eventual need for admission for further evaluation.  Wife without any further questions, concerns, or needs at this time and is amenable to the continued treatment plan.  [JS]   0119 Potassium noted to be 4.9 at this time.  Will begin insulin administration, glucose is 530.  [JS]   0124 Reviewed with Dr. Mejía remainder of lab results.  Insulin protocol started at this time.  [JS]   0127 Phone discussion with Dr. Moise, hospitalist. Dr. Moise will kindly accept patient for admission to the unit for further evaluation and management of DKA.  [JS]      ED Course User Index  [JS] Johnson Lindsey PA-C                  MDM  Number of Diagnoses or Management Options  Diabetic ketoacidosis without coma associated with type 1 diabetes mellitus (CMS/[facility]):      Amount and/or Complexity of Data Reviewed  Clinical lab tests: ordered and reviewed  Tests in the radiology section of CPT®:  ordered and reviewed  Review and summarize past medical records: yes  Discuss the patient with other providers: yes    Critical Care  Total time providing  "critical care: < 30 minutes    Patient Progress  Patient progress: stable      Final diagnoses:   Diabetic ketoacidosis without coma associated with type 1 diabetes mellitus (CMS/[facility])              Johnson Lindsey PA-C  11/30/19 0333      Electronically signed by Tyshawn Mejía MD at 11/30/19 0343       Hospital Medications (active)       Dose Frequency Start End    acetaminophen (TYLENOL) suppository 650 mg (Discontinued) 650 mg Every 4 Hours PRN 11/30/2019 12/1/2019    Sig - Route: Insert 1 suppository into the rectum Every 4 (Four) Hours As Needed for Fever (temperature greater than 101.5 F or headache). - Rectal    Reason for Discontinue: Patient Discharge    Linked Group 1:  \"Or\" Linked Group Details        acetaminophen (TYLENOL) tablet 650 mg (Discontinued) 650 mg Every 4 Hours PRN 11/30/2019 12/1/2019    Sig - Route: Take 2 tablets by mouth Every 4 (Four) Hours As Needed for Fever (fever greater than 101.5 F or headache). - Oral    Reason for Discontinue: Patient Discharge    Linked Group 1:  \"Or\" Linked Group Details        acetaminophen (TYLENOL) tablet 650 mg (Discontinued) 650 mg Every 6 Hours PRN 11/30/2019 12/1/2019    Sig - Route: Take 2 tablets by mouth Every 6 (Six) Hours As Needed for Mild Pain . - Oral    Reason for Discontinue: Patient Discharge    dextrose (GLUTOSE) oral gel 15 g (Discontinued) 15 g Every 15 Minutes PRN 11/30/2019 12/1/2019    Sig - Route: Take 15 application by mouth Every 15 (Fifteen) Minutes As Needed for Low Blood Sugar (Blood sugar less than 70). - Oral    Reason for Discontinue: Patient Discharge    enoxaparin (LOVENOX) syringe 40 mg (Discontinued) 40 mg Every 24 Hours 11/30/2019 12/1/2019    Sig - Route: Inject 0.4 mL under the skin into the appropriate area as directed Daily. - Subcutaneous    Reason for Discontinue: Patient Discharge    glucagon (human recombinant) (GLUCAGEN DIAGNOSTIC) injection 1 mg (Discontinued) 1 mg Every 15 Minutes PRN 11/30/2019 12/1/2019 " "   Sig - Route: Inject 1 mg under the skin into the appropriate area as directed Every 15 (Fifteen) Minutes As Needed for Low Blood Sugar (Blood Glucose Less Than 70). - Subcutaneous    Reason for Discontinue: Patient Discharge    insulin lispro (humaLOG) injection 0-9 Units (Discontinued) 0-9 Units 4 Times Daily With Meals & Nightly 11/30/2019 12/1/2019    Sig - Route: Inject 0-9 Units under the skin into the appropriate area as directed 4 (Four) Times a Day With Meals & at Bedtime. - Subcutaneous    Reason for Discontinue: Patient Discharge    ipratropium-albuterol (DUO-NEB) nebulizer solution 3 mL (Discontinued) 3 mL Every 6 Hours PRN 11/30/2019 12/1/2019    Sig - Route: Take 3 mL by nebulization Every 6 (Six) Hours As Needed for Wheezing or Shortness of Air. - Nebulization    Reason for Discontinue: Patient Discharge    LORazepam (ATIVAN) injection 1 mg (Discontinued) 1 mg Every 2 Hours PRN 11/30/2019 12/1/2019    Sig - Route: Infuse 0.5 mL into a venous catheter Every 2 (Two) Hours As Needed for Withdrawal (For CIWA-Ar 8-10). - Intravenous    Reason for Discontinue: Patient Discharge    Linked Group 2:  \"Or\" Linked Group Details        LORazepam (ATIVAN) injection 2 mg (Discontinued) 2 mg Every 1 Hour PRN 11/30/2019 12/1/2019    Sig - Route: Infuse 1 mL into a venous catheter Every 1 (One) Hour As Needed for Withdrawal (For CIWA-Ar 11-15). - Intravenous    Reason for Discontinue: Patient Discharge    Linked Group 2:  \"Or\" Linked Group Details        LORazepam (ATIVAN) injection 2 mg (Discontinued) 2 mg Every 15 Minutes PRN 11/30/2019 12/1/2019    Sig - Route: Infuse 1 mL into a venous catheter Every 15 (Fifteen) Minutes As Needed for Withdrawal (For CIWA-Ar Greater Than 15.  Repeat Dose in 15 Minutes if CIWA-Ar Does Not Decrease). - Intravenous    Reason for Discontinue: Patient Discharge    Linked Group 2:  \"Or\" Linked Group Details        LORazepam (ATIVAN) injection 2 mg (Discontinued) 2 mg Every 15 Minutes " "PRN 11/30/2019 12/1/2019    Sig - Route: Inject 1 mL into the appropriate muscle as directed by prescriber Every 15 (Fifteen) Minutes As Needed for Withdrawal (If Unable to Administer IV - For CIWA-Ar Greater Than 15.  Repeat Dose in 15 Minutes if CIWA-Ar Does Not Decrease). - Intramuscular    Reason for Discontinue: Patient Discharge    Linked Group 2:  \"Or\" Linked Group Details        LORazepam (ATIVAN) injection 4 mg (Discontinued) 4 mg Every 1 Hour PRN 11/30/2019 12/1/2019    Sig - Route: Infuse 2 mL into a venous catheter Every 1 (One) Hour As Needed for Withdrawal (If Unable to Administer IV - For CIWA-Ar Greater Than 15 After 2 Doses of 2 mg IV/IM.  Repeat Dose in 1 Hour if CIWA-Ar Does Not Decrease). - Intravenous    Reason for Discontinue: Patient Discharge    Linked Group 2:  \"Or\" Linked Group Details        LORazepam (ATIVAN) tablet 1 mg (Discontinued) 1 mg Every 2 Hours PRN 11/30/2019 12/1/2019    Sig - Route: Take 1 tablet by mouth Every 2 (Two) Hours As Needed for Withdrawal (For CIWA-Ar 8-10). - Oral    Reason for Discontinue: Patient Discharge    Linked Group 2:  \"Or\" Linked Group Details        LORazepam (ATIVAN) tablet 2 mg (Discontinued) 2 mg Every 1 Hour PRN 11/30/2019 12/1/2019    Sig - Route: Take 2 tablets by mouth Every 1 (One) Hour As Needed for Withdrawal (For CIWA-Ar 11-15). - Oral    Reason for Discontinue: Patient Discharge    Linked Group 2:  \"Or\" Linked Group Details        LORazepam (ATIVAN) tablet 4 mg (Discontinued) 4 mg Every 1 Hour PRN 11/30/2019 12/1/2019    Sig - Route: Take 4 tablets by mouth Every 1 (One) Hour As Needed for Withdrawal (For CIWA-Ar Greater Than 15 After 2 Doses of 2 mg IV/IM.  Repeat Dose in 1 Hour if CIWA-Ar Does Not Decrease). - Oral    Reason for Discontinue: Patient Discharge    Linked Group 2:  \"Or\" Linked Group Details        metFORMIN (GLUCOPHAGE) tablet 1,000 mg (Discontinued) 1,000 mg 2 Times Daily With Meals 11/30/2019 12/1/2019    Sig - Route: Take 2 " "tablets by mouth 2 (Two) Times a Day With Meals. - Oral    Reason for Discontinue: Patient Discharge    nicotine (NICODERM CQ) 21 MG/24HR patch 1 patch (Discontinued) 1 patch Every 24 Hours Scheduled 11/30/2019 12/1/2019    Sig - Route: Place 1 patch on the skin as directed by provider Daily. - Transdermal    Reason for Discontinue: Patient Discharge    ondansetron (ZOFRAN) injection 4 mg (Discontinued) 4 mg Every 6 Hours PRN 11/30/2019 12/1/2019    Sig - Route: Infuse 2 mL into a venous catheter Every 6 (Six) Hours As Needed for Nausea or Vomiting. - Intravenous    Reason for Discontinue: Patient Discharge    Linked Group 3:  \"Or\" Linked Group Details        ondansetron (ZOFRAN) injection 4 mg (Discontinued) 4 mg Every 6 Hours PRN 11/30/2019 12/1/2019    Sig - Route: Infuse 2 mL into a venous catheter Every 6 (Six) Hours As Needed for Nausea or Vomiting. - Intravenous    Reason for Discontinue: Patient Discharge    ondansetron (ZOFRAN) tablet 4 mg (Discontinued) 4 mg Every 6 Hours PRN 11/30/2019 12/1/2019    Sig - Route: Take 1 tablet by mouth Every 6 (Six) Hours As Needed for Nausea or Vomiting. - Oral    Reason for Discontinue: Patient Discharge    Linked Group 3:  \"Or\" Linked Group Details        sodium chloride 0.9 % flush 10 mL (Discontinued) 10 mL As Needed 11/29/2019 12/1/2019    Sig - Route: Infuse 10 mL into a venous catheter As Needed for Line Care. - Intravenous    Reason for Discontinue: Patient Discharge    Cosign for Ordering: Accepted by Tyshawn Mejía MD on 11/30/2019  1:11 AM    Linked Group 4:  \"And\" Linked Group Details        sodium chloride 0.9 % flush 10 mL (Discontinued) 10 mL Every 12 Hours Scheduled 11/30/2019 12/1/2019    Sig - Route: Infuse 10 mL into a venous catheter Every 12 (Twelve) Hours. - Intravenous    Reason for Discontinue: Patient Discharge    sodium chloride 0.9 % flush 10 mL (Discontinued) 10 mL As Needed 11/30/2019 12/1/2019    Sig - Route: Infuse 10 mL into a venous " catheter As Needed for Line Care. - Intravenous    Reason for Discontinue: Patient Discharge    sodium chloride 0.9 % infusion (Discontinued) 75 mL/hr Continuous 11/30/2019 12/1/2019    Sig - Route: Infuse 75 mL/hr into a venous catheter Continuous. - Intravenous    Reason for Discontinue: Patient Discharge    venlafaxine XR (EFFEXOR-XR) 24 hr capsule 75 mg (Discontinued) 75 mg Daily 11/30/2019 12/1/2019    Sig - Route: Take 1 capsule by mouth Daily. - Oral    Reason for Discontinue: Patient Discharge          Physician Progress Notes (last 7 days) (Notes from 11/25/19 1252 through 12/02/19 1252)     No notes of this type exist for this encounter.           Discharge Summary      Kraig Disla DO at 12/01/19 1207        The patient has been difficult with the nursing staff this morning.  I have spoken with Nery on 2 separate occasions.  He has been leaving the floor frequently to smoke.  He has been refusing medications and has also removed his IV on his own.    He wants to sign out AGAINST MEDICAL ADVICE at this point in time.  The patient has done this in the past.  He signed out of the emergency department in January of this year when I was trying to admit him and then ultimately did come back and was ultimately admitted.  However, he then told me later in the hospitalization to discharge him immediately or he would sign out.    He needs to follow with his primary care provider later this week.    Kraig Disla DO  12/01/19  12:06 PM            Electronically signed by Kraig Disla DO at 12/01/19 2489

## 2019-12-05 LAB
BACTERIA SPEC AEROBE CULT: NORMAL
BACTERIA SPEC AEROBE CULT: NORMAL

## 2020-08-25 LAB — HOLD SPECIMEN: NORMAL

## 2022-12-16 ENCOUNTER — HOSPITAL ENCOUNTER (INPATIENT)
Age: 42
LOS: 10 days | Discharge: LEFT BEFORE DISCHARGE | DRG: 637 | End: 2022-12-26
Attending: EMERGENCY MEDICINE | Admitting: HOSPITALIST
Payer: COMMERCIAL

## 2022-12-16 ENCOUNTER — APPOINTMENT (OUTPATIENT)
Dept: GENERAL RADIOLOGY | Age: 42
DRG: 637 | End: 2022-12-16
Payer: COMMERCIAL

## 2022-12-16 DIAGNOSIS — E10.10 DIABETIC KETOACIDOSIS WITHOUT COMA ASSOCIATED WITH TYPE 1 DIABETES MELLITUS (HCC): Primary | ICD-10-CM

## 2022-12-16 DIAGNOSIS — R91.8 PULMONARY INFILTRATES: ICD-10-CM

## 2022-12-16 PROBLEM — E11.10 DKA, TYPE 2, NOT AT GOAL (HCC): Status: ACTIVE | Noted: 2022-12-16

## 2022-12-16 LAB
ACETONE BLOOD: ABNORMAL
ADENOVIRUS BY PCR: NOT DETECTED
ALBUMIN SERPL-MCNC: 3.6 G/DL (ref 3.5–5.2)
ALP BLD-CCNC: 216 U/L (ref 40–130)
ALT SERPL-CCNC: 9 U/L (ref 5–41)
ANION GAP SERPL CALCULATED.3IONS-SCNC: 38 MMOL/L (ref 7–19)
AST SERPL-CCNC: 8 U/L (ref 5–40)
BASE EXCESS VENOUS: -19 MMOL/L
BASOPHILS ABSOLUTE: 0.1 K/UL (ref 0–0.2)
BASOPHILS RELATIVE PERCENT: 0.6 % (ref 0–1)
BILIRUB SERPL-MCNC: 0.3 MG/DL (ref 0.2–1.2)
BORDETELLA PARAPERTUSSIS BY PCR: NOT DETECTED
BORDETELLA PERTUSSIS BY PCR: NOT DETECTED
BUN BLDV-MCNC: 91 MG/DL (ref 6–20)
CALCIUM SERPL-MCNC: 10.1 MG/DL (ref 8.6–10)
CARBOXYHEMOGLOBIN: 2.3 %
CHLAMYDOPHILIA PNEUMONIAE BY PCR: NOT DETECTED
CHLORIDE BLD-SCNC: 80 MMOL/L (ref 98–111)
CO2: 5 MMOL/L (ref 22–29)
CORONAVIRUS 229E BY PCR: NOT DETECTED
CORONAVIRUS HKU1 BY PCR: NOT DETECTED
CORONAVIRUS NL63 BY PCR: NOT DETECTED
CORONAVIRUS OC43 BY PCR: NOT DETECTED
CREAT SERPL-MCNC: 3.8 MG/DL (ref 0.5–1.2)
EOSINOPHILS ABSOLUTE: 0.1 K/UL (ref 0–0.6)
EOSINOPHILS RELATIVE PERCENT: 0.5 % (ref 0–5)
GFR SERPL CREATININE-BSD FRML MDRD: 19 ML/MIN/{1.73_M2}
GLUCOSE BLD-MCNC: 345 MG/DL (ref 70–99)
GLUCOSE BLD-MCNC: 477 MG/DL (ref 70–99)
GLUCOSE BLD-MCNC: 584 MG/DL (ref 74–109)
GLUCOSE BLD-MCNC: 745 MG/DL (ref 74–109)
GLUCOSE BLD-MCNC: >550 MG/DL (ref 70–99)
HBA1C MFR BLD: 12.3 % (ref 4–6)
HCO3 VENOUS: 9 MMOL/L (ref 23–29)
HCT VFR BLD CALC: 42.8 % (ref 42–52)
HEMOGLOBIN: 13.2 G/DL (ref 14–18)
HUMAN METAPNEUMOVIRUS BY PCR: NOT DETECTED
HUMAN RHINOVIRUS/ENTEROVIRUS BY PCR: DETECTED
IMMATURE GRANULOCYTES #: 0.1 K/UL
INFLUENZA A H3 BY PCR: DETECTED
INFLUENZA B BY PCR: NOT DETECTED
LYMPHOCYTES ABSOLUTE: 0.6 K/UL (ref 1.1–4.5)
LYMPHOCYTES RELATIVE PERCENT: 3.4 % (ref 20–40)
MAGNESIUM: 2.5 MG/DL (ref 1.6–2.6)
MCH RBC QN AUTO: 29.9 PG (ref 27–31)
MCHC RBC AUTO-ENTMCNC: 30.8 G/DL (ref 33–37)
MCV RBC AUTO: 97.1 FL (ref 80–94)
MONOCYTES ABSOLUTE: 1.7 K/UL (ref 0–0.9)
MONOCYTES RELATIVE PERCENT: 10.8 % (ref 0–10)
MYCOPLASMA PNEUMONIAE BY PCR: NOT DETECTED
NEUTROPHILS ABSOLUTE: 13.5 K/UL (ref 1.5–7.5)
NEUTROPHILS RELATIVE PERCENT: 84.1 % (ref 50–65)
O2 CONTENT, VEN: 10 ML/DL
O2 SAT, VEN: 52 %
PARAINFLUENZA VIRUS 1 BY PCR: NOT DETECTED
PARAINFLUENZA VIRUS 2 BY PCR: NOT DETECTED
PARAINFLUENZA VIRUS 3 BY PCR: NOT DETECTED
PARAINFLUENZA VIRUS 4 BY PCR: NOT DETECTED
PCO2, VEN: 27 MMHG (ref 40–50)
PDW BLD-RTO: 12.6 % (ref 11.5–14.5)
PERFORMED ON: ABNORMAL
PH VENOUS: 7.12 (ref 7.35–7.45)
PLATELET # BLD: 397 K/UL (ref 130–400)
PMV BLD AUTO: 11.5 FL (ref 9.4–12.4)
PO2, VEN: 31 MMHG
POTASSIUM SERPL-SCNC: 4.8 MMOL/L (ref 3.5–5)
POTASSIUM SERPL-SCNC: 5.7 MMOL/L (ref 3.5–5)
RBC # BLD: 4.41 M/UL (ref 4.7–6.1)
RESPIRATORY SYNCYTIAL VIRUS BY PCR: NOT DETECTED
SARS-COV-2, NAAT: NOT DETECTED
SARS-COV-2, PCR: NOT DETECTED
SODIUM BLD-SCNC: 123 MMOL/L (ref 136–145)
TOTAL PROTEIN: 8.2 G/DL (ref 6.6–8.7)
TSH SERPL DL<=0.05 MIU/L-ACNC: 2.17 UIU/ML (ref 0.27–4.2)
WBC # BLD: 16.1 K/UL (ref 4.8–10.8)

## 2022-12-16 PROCEDURE — 82728 ASSAY OF FERRITIN: CPT

## 2022-12-16 PROCEDURE — 2580000003 HC RX 258: Performed by: HOSPITALIST

## 2022-12-16 PROCEDURE — 2000000000 HC ICU R&B

## 2022-12-16 PROCEDURE — 96365 THER/PROPH/DIAG IV INF INIT: CPT

## 2022-12-16 PROCEDURE — 96366 THER/PROPH/DIAG IV INF ADDON: CPT

## 2022-12-16 PROCEDURE — 6370000000 HC RX 637 (ALT 250 FOR IP): Performed by: HOSPITALIST

## 2022-12-16 PROCEDURE — 99285 EMERGENCY DEPT VISIT HI MDM: CPT

## 2022-12-16 PROCEDURE — 82746 ASSAY OF FOLIC ACID SERUM: CPT

## 2022-12-16 PROCEDURE — 93005 ELECTROCARDIOGRAM TRACING: CPT | Performed by: EMERGENCY MEDICINE

## 2022-12-16 PROCEDURE — 86140 C-REACTIVE PROTEIN: CPT

## 2022-12-16 PROCEDURE — 71045 X-RAY EXAM CHEST 1 VIEW: CPT

## 2022-12-16 PROCEDURE — 6360000002 HC RX W HCPCS: Performed by: EMERGENCY MEDICINE

## 2022-12-16 PROCEDURE — 82009 KETONE BODYS QUAL: CPT

## 2022-12-16 PROCEDURE — 96361 HYDRATE IV INFUSION ADD-ON: CPT

## 2022-12-16 PROCEDURE — 82947 ASSAY GLUCOSE BLOOD QUANT: CPT

## 2022-12-16 PROCEDURE — 83735 ASSAY OF MAGNESIUM: CPT

## 2022-12-16 PROCEDURE — 82803 BLOOD GASES ANY COMBINATION: CPT

## 2022-12-16 PROCEDURE — 80053 COMPREHEN METABOLIC PANEL: CPT

## 2022-12-16 PROCEDURE — 6370000000 HC RX 637 (ALT 250 FOR IP): Performed by: EMERGENCY MEDICINE

## 2022-12-16 PROCEDURE — 84443 ASSAY THYROID STIM HORMONE: CPT

## 2022-12-16 PROCEDURE — 83550 IRON BINDING TEST: CPT

## 2022-12-16 PROCEDURE — 83540 ASSAY OF IRON: CPT

## 2022-12-16 PROCEDURE — 0202U NFCT DS 22 TRGT SARS-COV-2: CPT

## 2022-12-16 PROCEDURE — 83036 HEMOGLOBIN GLYCOSYLATED A1C: CPT

## 2022-12-16 PROCEDURE — 82306 VITAMIN D 25 HYDROXY: CPT

## 2022-12-16 PROCEDURE — 82550 ASSAY OF CK (CPK): CPT

## 2022-12-16 PROCEDURE — 82607 VITAMIN B-12: CPT

## 2022-12-16 PROCEDURE — 87635 SARS-COV-2 COVID-19 AMP PRB: CPT

## 2022-12-16 PROCEDURE — 85025 COMPLETE CBC W/AUTO DIFF WBC: CPT

## 2022-12-16 PROCEDURE — 2580000003 HC RX 258: Performed by: EMERGENCY MEDICINE

## 2022-12-16 PROCEDURE — 36415 COLL VENOUS BLD VENIPUNCTURE: CPT

## 2022-12-16 PROCEDURE — 84132 ASSAY OF SERUM POTASSIUM: CPT

## 2022-12-16 PROCEDURE — 96375 TX/PRO/DX INJ NEW DRUG ADDON: CPT

## 2022-12-16 RX ORDER — DEXTROSE MONOHYDRATE 100 MG/ML
INJECTION, SOLUTION INTRAVENOUS CONTINUOUS PRN
Status: DISCONTINUED | OUTPATIENT
Start: 2022-12-16 | End: 2022-12-26 | Stop reason: HOSPADM

## 2022-12-16 RX ORDER — HYDROMORPHONE HYDROCHLORIDE 1 MG/ML
0.5 INJECTION, SOLUTION INTRAMUSCULAR; INTRAVENOUS; SUBCUTANEOUS EVERY 4 HOURS PRN
Status: DISCONTINUED | OUTPATIENT
Start: 2022-12-16 | End: 2022-12-18

## 2022-12-16 RX ORDER — OSELTAMIVIR PHOSPHATE 6 MG/ML
30 FOR SUSPENSION ORAL DAILY
Status: DISCONTINUED | OUTPATIENT
Start: 2022-12-17 | End: 2022-12-19

## 2022-12-16 RX ORDER — LISINOPRIL 2.5 MG/1
2.5 TABLET ORAL DAILY
COMMUNITY
End: 2022-12-16

## 2022-12-16 RX ORDER — 0.9 % SODIUM CHLORIDE 0.9 %
1000 INTRAVENOUS SOLUTION INTRAVENOUS ONCE
Status: COMPLETED | OUTPATIENT
Start: 2022-12-16 | End: 2022-12-16

## 2022-12-16 RX ORDER — SODIUM POLYSTYRENE SULFONATE 15 G/60ML
15 SUSPENSION ORAL; RECTAL ONCE
Status: DISCONTINUED | OUTPATIENT
Start: 2022-12-16 | End: 2022-12-16

## 2022-12-16 RX ORDER — SODIUM CHLORIDE 9 MG/ML
INJECTION, SOLUTION INTRAVENOUS CONTINUOUS
Status: DISCONTINUED | OUTPATIENT
Start: 2022-12-16 | End: 2022-12-17

## 2022-12-16 RX ORDER — ONDANSETRON 2 MG/ML
4 INJECTION INTRAMUSCULAR; INTRAVENOUS ONCE
Status: COMPLETED | OUTPATIENT
Start: 2022-12-16 | End: 2022-12-16

## 2022-12-16 RX ORDER — ENOXAPARIN SODIUM 100 MG/ML
30 INJECTION SUBCUTANEOUS DAILY
Status: DISCONTINUED | OUTPATIENT
Start: 2022-12-17 | End: 2022-12-18

## 2022-12-16 RX ORDER — MAGNESIUM SULFATE 1 G/100ML
1000 INJECTION INTRAVENOUS PRN
Status: DISCONTINUED | OUTPATIENT
Start: 2022-12-16 | End: 2022-12-26 | Stop reason: HOSPADM

## 2022-12-16 RX ORDER — POTASSIUM CHLORIDE 7.45 MG/ML
10 INJECTION INTRAVENOUS PRN
Status: DISCONTINUED | OUTPATIENT
Start: 2022-12-16 | End: 2022-12-18

## 2022-12-16 RX ORDER — INSULIN ASPART 100 [IU]/ML
INJECTION, SOLUTION INTRAVENOUS; SUBCUTANEOUS 3 TIMES DAILY
COMMUNITY

## 2022-12-16 RX ORDER — HYDROMORPHONE HYDROCHLORIDE 1 MG/ML
0.5 INJECTION, SOLUTION INTRAMUSCULAR; INTRAVENOUS; SUBCUTANEOUS EVERY 30 MIN PRN
Status: DISCONTINUED | OUTPATIENT
Start: 2022-12-16 | End: 2022-12-17

## 2022-12-16 RX ORDER — 0.9 % SODIUM CHLORIDE 0.9 %
15 INTRAVENOUS SOLUTION INTRAVENOUS ONCE
Status: COMPLETED | OUTPATIENT
Start: 2022-12-16 | End: 2022-12-17

## 2022-12-16 RX ORDER — POLYETHYLENE GLYCOL 3350 17 G/17G
17 POWDER, FOR SOLUTION ORAL DAILY PRN
Status: DISCONTINUED | OUTPATIENT
Start: 2022-12-16 | End: 2022-12-26 | Stop reason: HOSPADM

## 2022-12-16 RX ORDER — INSULIN GLARGINE 100 [IU]/ML
22 INJECTION, SOLUTION SUBCUTANEOUS EVERY MORNING
COMMUNITY

## 2022-12-16 RX ORDER — DEXTROSE, SODIUM CHLORIDE, AND POTASSIUM CHLORIDE 5; .45; .15 G/100ML; G/100ML; G/100ML
INJECTION INTRAVENOUS CONTINUOUS PRN
Status: DISCONTINUED | OUTPATIENT
Start: 2022-12-16 | End: 2022-12-18

## 2022-12-16 RX ADMIN — SODIUM CHLORIDE 0.01 UNITS/KG/HR: 9 INJECTION, SOLUTION INTRAVENOUS at 23:03

## 2022-12-16 RX ADMIN — ONDANSETRON 4 MG: 2 INJECTION INTRAMUSCULAR; INTRAVENOUS at 20:34

## 2022-12-16 RX ADMIN — SODIUM CHLORIDE 20.5 UNITS/HR: 9 INJECTION, SOLUTION INTRAVENOUS at 20:57

## 2022-12-16 RX ADMIN — HYDROMORPHONE HYDROCHLORIDE 0.5 MG: 1 INJECTION, SOLUTION INTRAMUSCULAR; INTRAVENOUS; SUBCUTANEOUS at 20:39

## 2022-12-16 RX ADMIN — CEFTRIAXONE 1000 MG: 1 INJECTION, POWDER, FOR SOLUTION INTRAMUSCULAR; INTRAVENOUS at 23:55

## 2022-12-16 RX ADMIN — SODIUM CHLORIDE 1000 ML: 9 INJECTION, SOLUTION INTRAVENOUS at 20:36

## 2022-12-16 RX ADMIN — HYDROMORPHONE HYDROCHLORIDE 0.5 MG: 1 INJECTION, SOLUTION INTRAMUSCULAR; INTRAVENOUS; SUBCUTANEOUS at 22:10

## 2022-12-16 RX ADMIN — SODIUM CHLORIDE 1000 ML: 9 INJECTION, SOLUTION INTRAVENOUS at 22:42

## 2022-12-16 ASSESSMENT — PAIN DESCRIPTION - DESCRIPTORS: DESCRIPTORS: ACHING

## 2022-12-16 ASSESSMENT — PAIN SCALES - GENERAL
PAINLEVEL_OUTOF10: 8
PAINLEVEL_OUTOF10: 8
PAINLEVEL_OUTOF10: 7

## 2022-12-16 ASSESSMENT — ENCOUNTER SYMPTOMS
DIARRHEA: 0
EYE DISCHARGE: 0
BLOOD IN STOOL: 0
SORE THROAT: 0
VOMITING: 1
ABDOMINAL PAIN: 0
VOICE CHANGE: 0
SINUS PRESSURE: 0
CONSTIPATION: 0
NAUSEA: 1
FACIAL SWELLING: 0
CHOKING: 0
SHORTNESS OF BREATH: 0
APNEA: 0
COUGH: 0

## 2022-12-16 ASSESSMENT — PAIN DESCRIPTION - LOCATION
LOCATION: GENERALIZED
LOCATION: GENERALIZED
LOCATION: BACK;SHOULDER

## 2022-12-16 ASSESSMENT — PAIN - FUNCTIONAL ASSESSMENT: PAIN_FUNCTIONAL_ASSESSMENT: 0-10

## 2022-12-17 PROBLEM — E43 SEVERE MALNUTRITION (HCC): Status: ACTIVE | Noted: 2022-12-17

## 2022-12-17 LAB
AMPHETAMINE SCREEN, URINE: NEGATIVE
ANION GAP SERPL CALCULATED.3IONS-SCNC: 14 MMOL/L (ref 7–19)
ANION GAP SERPL CALCULATED.3IONS-SCNC: 14 MMOL/L (ref 7–19)
ANION GAP SERPL CALCULATED.3IONS-SCNC: 15 MMOL/L (ref 7–19)
ANION GAP SERPL CALCULATED.3IONS-SCNC: 16 MMOL/L (ref 7–19)
ANION GAP SERPL CALCULATED.3IONS-SCNC: 19 MMOL/L (ref 7–19)
ANION GAP SERPL CALCULATED.3IONS-SCNC: 24 MMOL/L (ref 7–19)
BACTERIA: ABNORMAL /HPF
BARBITURATE SCREEN URINE: NEGATIVE
BENZODIAZEPINE SCREEN, URINE: NEGATIVE
BILIRUBIN URINE: NEGATIVE
BLOOD, URINE: ABNORMAL
BUN BLDV-MCNC: 54 MG/DL (ref 6–20)
BUN BLDV-MCNC: 61 MG/DL (ref 6–20)
BUN BLDV-MCNC: 67 MG/DL (ref 6–20)
BUN BLDV-MCNC: 75 MG/DL (ref 6–20)
BUN BLDV-MCNC: 77 MG/DL (ref 6–20)
BUN BLDV-MCNC: 83 MG/DL (ref 6–20)
BUPRENORPHINE URINE: NEGATIVE
C-REACTIVE PROTEIN: 39.57 MG/DL (ref 0–0.5)
CALCIUM SERPL-MCNC: 8.9 MG/DL (ref 8.6–10)
CALCIUM SERPL-MCNC: 9.1 MG/DL (ref 8.6–10)
CALCIUM SERPL-MCNC: 9.3 MG/DL (ref 8.6–10)
CALCIUM SERPL-MCNC: 9.3 MG/DL (ref 8.6–10)
CALCIUM SERPL-MCNC: 9.4 MG/DL (ref 8.6–10)
CALCIUM SERPL-MCNC: 9.5 MG/DL (ref 8.6–10)
CANNABINOID SCREEN URINE: NEGATIVE
CHLORIDE BLD-SCNC: 101 MMOL/L (ref 98–111)
CHLORIDE BLD-SCNC: 102 MMOL/L (ref 98–111)
CHLORIDE BLD-SCNC: 103 MMOL/L (ref 98–111)
CHLORIDE BLD-SCNC: 90 MMOL/L (ref 98–111)
CHLORIDE BLD-SCNC: 98 MMOL/L (ref 98–111)
CHLORIDE BLD-SCNC: 99 MMOL/L (ref 98–111)
CLARITY: CLEAR
CO2: 13 MMOL/L (ref 22–29)
CO2: 14 MMOL/L (ref 22–29)
CO2: 14 MMOL/L (ref 22–29)
CO2: 16 MMOL/L (ref 22–29)
COARSE CASTS, UA: ABNORMAL /LPF (ref 0–5)
COCAINE METABOLITE SCREEN URINE: NEGATIVE
COLOR: YELLOW
CREAT SERPL-MCNC: 2.2 MG/DL (ref 0.5–1.2)
CREAT SERPL-MCNC: 2.5 MG/DL (ref 0.5–1.2)
CREAT SERPL-MCNC: 2.7 MG/DL (ref 0.5–1.2)
CREAT SERPL-MCNC: 2.7 MG/DL (ref 0.5–1.2)
CREAT SERPL-MCNC: 2.8 MG/DL (ref 0.5–1.2)
CREAT SERPL-MCNC: 3.1 MG/DL (ref 0.5–1.2)
CRYSTALS, UA: ABNORMAL /HPF
FERRITIN: 1899 NG/ML (ref 30–400)
FOLATE: 13.4 NG/ML (ref 4.5–32.2)
GFR SERPL CREATININE-BSD FRML MDRD: 24 ML/MIN/{1.73_M2}
GFR SERPL CREATININE-BSD FRML MDRD: 28 ML/MIN/{1.73_M2}
GFR SERPL CREATININE-BSD FRML MDRD: 29 ML/MIN/{1.73_M2}
GFR SERPL CREATININE-BSD FRML MDRD: 29 ML/MIN/{1.73_M2}
GFR SERPL CREATININE-BSD FRML MDRD: 32 ML/MIN/{1.73_M2}
GFR SERPL CREATININE-BSD FRML MDRD: 37 ML/MIN/{1.73_M2}
GLUCOSE BLD-MCNC: 106 MG/DL (ref 70–99)
GLUCOSE BLD-MCNC: 133 MG/DL (ref 70–99)
GLUCOSE BLD-MCNC: 135 MG/DL (ref 74–109)
GLUCOSE BLD-MCNC: 152 MG/DL (ref 70–99)
GLUCOSE BLD-MCNC: 155 MG/DL (ref 70–99)
GLUCOSE BLD-MCNC: 159 MG/DL (ref 70–99)
GLUCOSE BLD-MCNC: 161 MG/DL (ref 70–99)
GLUCOSE BLD-MCNC: 167 MG/DL (ref 70–99)
GLUCOSE BLD-MCNC: 170 MG/DL (ref 70–99)
GLUCOSE BLD-MCNC: 170 MG/DL (ref 70–99)
GLUCOSE BLD-MCNC: 177 MG/DL (ref 74–109)
GLUCOSE BLD-MCNC: 179 MG/DL (ref 70–99)
GLUCOSE BLD-MCNC: 182 MG/DL (ref 70–99)
GLUCOSE BLD-MCNC: 187 MG/DL (ref 74–109)
GLUCOSE BLD-MCNC: 193 MG/DL (ref 70–99)
GLUCOSE BLD-MCNC: 196 MG/DL (ref 70–99)
GLUCOSE BLD-MCNC: 206 MG/DL (ref 70–99)
GLUCOSE BLD-MCNC: 209 MG/DL (ref 74–109)
GLUCOSE BLD-MCNC: 230 MG/DL (ref 70–99)
GLUCOSE BLD-MCNC: 254 MG/DL (ref 70–99)
GLUCOSE BLD-MCNC: 283 MG/DL (ref 70–99)
GLUCOSE BLD-MCNC: 293 MG/DL (ref 70–99)
GLUCOSE BLD-MCNC: 302 MG/DL (ref 74–109)
GLUCOSE BLD-MCNC: 323 MG/DL (ref 70–99)
GLUCOSE BLD-MCNC: 367 MG/DL (ref 70–99)
GLUCOSE BLD-MCNC: 404 MG/DL (ref 74–109)
GLUCOSE BLD-MCNC: 93 MG/DL (ref 70–99)
GLUCOSE URINE: =>1000 MG/DL
HCT VFR BLD CALC: 34.1 % (ref 42–52)
HEMOGLOBIN: 11.2 G/DL (ref 14–18)
IRON SATURATION: 18 % (ref 14–50)
IRON: 26 UG/DL (ref 59–158)
KETONES, URINE: 15 MG/DL
LACTIC ACID: 1.7 MMOL/L (ref 0.5–1.9)
LEUKOCYTE ESTERASE, URINE: NEGATIVE
Lab: NORMAL
MAGNESIUM: 1.6 MG/DL (ref 1.6–2.6)
MAGNESIUM: 1.6 MG/DL (ref 1.6–2.6)
MAGNESIUM: 1.8 MG/DL (ref 1.6–2.6)
MAGNESIUM: 1.8 MG/DL (ref 1.6–2.6)
MAGNESIUM: 1.9 MG/DL (ref 1.6–2.6)
MAGNESIUM: 2.1 MG/DL (ref 1.6–2.6)
MCH RBC QN AUTO: 29.7 PG (ref 27–31)
MCHC RBC AUTO-ENTMCNC: 32.8 G/DL (ref 33–37)
MCV RBC AUTO: 90.5 FL (ref 80–94)
METHADONE SCREEN, URINE: NEGATIVE
METHAMPHETAMINE, URINE: NEGATIVE
NITRITE, URINE: NEGATIVE
OPIATE SCREEN URINE: NEGATIVE
OXYCODONE URINE: NEGATIVE
PDW BLD-RTO: 12.4 % (ref 11.5–14.5)
PERFORMED ON: ABNORMAL
PERFORMED ON: NORMAL
PH UA: 5 (ref 5–8)
PHENCYCLIDINE SCREEN URINE: NEGATIVE
PHOSPHORUS: 1.7 MG/DL (ref 2.5–4.5)
PHOSPHORUS: 2.1 MG/DL (ref 2.5–4.5)
PHOSPHORUS: 2.2 MG/DL (ref 2.5–4.5)
PHOSPHORUS: 2.8 MG/DL (ref 2.5–4.5)
PHOSPHORUS: 2.9 MG/DL (ref 2.5–4.5)
PHOSPHORUS: 4.1 MG/DL (ref 2.5–4.5)
PLATELET # BLD: 331 K/UL (ref 130–400)
PMV BLD AUTO: 10.6 FL (ref 9.4–12.4)
POTASSIUM SERPL-SCNC: 4.3 MMOL/L (ref 3.5–5)
POTASSIUM SERPL-SCNC: 4.4 MMOL/L (ref 3.5–5)
POTASSIUM SERPL-SCNC: 4.7 MMOL/L (ref 3.5–5)
POTASSIUM SERPL-SCNC: 4.8 MMOL/L (ref 3.5–5)
POTASSIUM SERPL-SCNC: 4.9 MMOL/L (ref 3.5–5)
POTASSIUM SERPL-SCNC: 5.2 MMOL/L (ref 3.5–5)
PROPOXYPHENE SCREEN: NEGATIVE
PROTEIN UA: 30 MG/DL
RBC # BLD: 3.77 M/UL (ref 4.7–6.1)
RBC UA: ABNORMAL /HPF (ref 0–2)
REASON FOR REJECTION: NORMAL
REJECTED TEST: NORMAL
SEDIMENTATION RATE, ERYTHROCYTE: 104 MM/HR (ref 0–10)
SODIUM BLD-SCNC: 127 MMOL/L (ref 136–145)
SODIUM BLD-SCNC: 129 MMOL/L (ref 136–145)
SODIUM BLD-SCNC: 131 MMOL/L (ref 136–145)
SODIUM BLD-SCNC: 131 MMOL/L (ref 136–145)
SODIUM BLD-SCNC: 133 MMOL/L (ref 136–145)
SODIUM BLD-SCNC: 133 MMOL/L (ref 136–145)
SPECIFIC GRAVITY UA: 1.02 (ref 1–1.03)
TOTAL CK: 112 U/L (ref 39–308)
TOTAL IRON BINDING CAPACITY: 146 UG/DL (ref 250–400)
TRICYCLIC, URINE: NEGATIVE
UROBILINOGEN, URINE: 0.2 E.U./DL
VITAMIN B-12: 1561 PG/ML (ref 211–946)
VITAMIN D 25-HYDROXY: 31.9 NG/ML
WBC # BLD: 5 K/UL (ref 4.8–10.8)

## 2022-12-17 PROCEDURE — 87150 DNA/RNA AMPLIFIED PROBE: CPT

## 2022-12-17 PROCEDURE — 36415 COLL VENOUS BLD VENIPUNCTURE: CPT

## 2022-12-17 PROCEDURE — 84100 ASSAY OF PHOSPHORUS: CPT

## 2022-12-17 PROCEDURE — 80048 BASIC METABOLIC PNL TOTAL CA: CPT

## 2022-12-17 PROCEDURE — 80306 DRUG TEST PRSMV INSTRMNT: CPT

## 2022-12-17 PROCEDURE — 83605 ASSAY OF LACTIC ACID: CPT

## 2022-12-17 PROCEDURE — 2580000003 HC RX 258: Performed by: INTERNAL MEDICINE

## 2022-12-17 PROCEDURE — 2000000000 HC ICU R&B

## 2022-12-17 PROCEDURE — 2580000003 HC RX 258: Performed by: EMERGENCY MEDICINE

## 2022-12-17 PROCEDURE — 81001 URINALYSIS AUTO W/SCOPE: CPT

## 2022-12-17 PROCEDURE — 87040 BLOOD CULTURE FOR BACTERIA: CPT

## 2022-12-17 PROCEDURE — 6370000000 HC RX 637 (ALT 250 FOR IP): Performed by: HOSPITALIST

## 2022-12-17 PROCEDURE — 2700000000 HC OXYGEN THERAPY PER DAY

## 2022-12-17 PROCEDURE — C9113 INJ PANTOPRAZOLE SODIUM, VIA: HCPCS | Performed by: HOSPITALIST

## 2022-12-17 PROCEDURE — 6360000002 HC RX W HCPCS: Performed by: HOSPITALIST

## 2022-12-17 PROCEDURE — 6360000002 HC RX W HCPCS: Performed by: INTERNAL MEDICINE

## 2022-12-17 PROCEDURE — 87641 MR-STAPH DNA AMP PROBE: CPT

## 2022-12-17 PROCEDURE — 83735 ASSAY OF MAGNESIUM: CPT

## 2022-12-17 PROCEDURE — 6360000002 HC RX W HCPCS: Performed by: EMERGENCY MEDICINE

## 2022-12-17 PROCEDURE — 85652 RBC SED RATE AUTOMATED: CPT

## 2022-12-17 PROCEDURE — 86403 PARTICLE AGGLUT ANTBDY SCRN: CPT

## 2022-12-17 PROCEDURE — 82947 ASSAY GLUCOSE BLOOD QUANT: CPT

## 2022-12-17 PROCEDURE — 94760 N-INVAS EAR/PLS OXIMETRY 1: CPT

## 2022-12-17 PROCEDURE — 85027 COMPLETE CBC AUTOMATED: CPT

## 2022-12-17 PROCEDURE — 87186 SC STD MICRODIL/AGAR DIL: CPT

## 2022-12-17 PROCEDURE — 2500000003 HC RX 250 WO HCPCS: Performed by: INTERNAL MEDICINE

## 2022-12-17 PROCEDURE — 2580000003 HC RX 258: Performed by: HOSPITALIST

## 2022-12-17 PROCEDURE — 2500000003 HC RX 250 WO HCPCS: Performed by: HOSPITALIST

## 2022-12-17 RX ORDER — SODIUM CHLORIDE, SODIUM LACTATE, POTASSIUM CHLORIDE, CALCIUM CHLORIDE 600; 310; 30; 20 MG/100ML; MG/100ML; MG/100ML; MG/100ML
INJECTION, SOLUTION INTRAVENOUS CONTINUOUS
Status: DISCONTINUED | OUTPATIENT
Start: 2022-12-17 | End: 2022-12-22

## 2022-12-17 RX ORDER — ONDANSETRON 2 MG/ML
4 INJECTION INTRAMUSCULAR; INTRAVENOUS EVERY 6 HOURS PRN
Status: DISCONTINUED | OUTPATIENT
Start: 2022-12-17 | End: 2022-12-26 | Stop reason: HOSPADM

## 2022-12-17 RX ADMIN — SODIUM PHOSPHATE, MONOBASIC, MONOHYDRATE AND SODIUM PHOSPHATE, DIBASIC, ANHYDROUS 15 MMOL: 142; 276 INJECTION, SOLUTION INTRAVENOUS at 19:29

## 2022-12-17 RX ADMIN — MAGNESIUM SULFATE HEPTAHYDRATE 1000 MG: 1 INJECTION, SOLUTION INTRAVENOUS at 19:11

## 2022-12-17 RX ADMIN — POTASSIUM CHLORIDE 10 MEQ: 7.46 INJECTION, SOLUTION INTRAVENOUS at 02:22

## 2022-12-17 RX ADMIN — SODIUM CHLORIDE: 9 INJECTION, SOLUTION INTRAVENOUS at 01:47

## 2022-12-17 RX ADMIN — VANCOMYCIN HYDROCHLORIDE 1250 MG: 5 INJECTION, POWDER, LYOPHILIZED, FOR SOLUTION INTRAVENOUS at 15:43

## 2022-12-17 RX ADMIN — DEXTROSE, SODIUM CHLORIDE, AND POTASSIUM CHLORIDE: 5; .45; .15 INJECTION INTRAVENOUS at 06:35

## 2022-12-17 RX ADMIN — SODIUM CHLORIDE 0.18 UNITS/KG/HR: 9 INJECTION, SOLUTION INTRAVENOUS at 12:38

## 2022-12-17 RX ADMIN — PIPERACILLIN AND TAZOBACTAM 3375 MG: 3; .375 INJECTION, POWDER, FOR SOLUTION INTRAVENOUS at 21:03

## 2022-12-17 RX ADMIN — AZITHROMYCIN MONOHYDRATE 500 MG: 500 INJECTION, POWDER, LYOPHILIZED, FOR SOLUTION INTRAVENOUS at 00:01

## 2022-12-17 RX ADMIN — POTASSIUM CHLORIDE 10 MEQ: 7.46 INJECTION, SOLUTION INTRAVENOUS at 07:35

## 2022-12-17 RX ADMIN — SODIUM CHLORIDE, POTASSIUM CHLORIDE, SODIUM LACTATE AND CALCIUM CHLORIDE: 600; 310; 30; 20 INJECTION, SOLUTION INTRAVENOUS at 12:58

## 2022-12-17 RX ADMIN — DOXYCYCLINE 100 MG: 100 INJECTION, POWDER, LYOPHILIZED, FOR SOLUTION INTRAVENOUS at 14:36

## 2022-12-17 RX ADMIN — POTASSIUM CHLORIDE 10 MEQ: 7.46 INJECTION, SOLUTION INTRAVENOUS at 06:38

## 2022-12-17 RX ADMIN — ENOXAPARIN SODIUM 30 MG: 100 INJECTION SUBCUTANEOUS at 07:55

## 2022-12-17 RX ADMIN — SODIUM CHLORIDE: 9 INJECTION, SOLUTION INTRAVENOUS at 02:07

## 2022-12-17 RX ADMIN — NALOXEGOL OXALATE 12.5 MG: 12.5 TABLET, FILM COATED ORAL at 07:54

## 2022-12-17 RX ADMIN — POTASSIUM CHLORIDE 10 MEQ: 7.46 INJECTION, SOLUTION INTRAVENOUS at 18:51

## 2022-12-17 RX ADMIN — DEXTROSE, SODIUM CHLORIDE, AND POTASSIUM CHLORIDE: 5; .45; .15 INJECTION INTRAVENOUS at 19:51

## 2022-12-17 RX ADMIN — POTASSIUM CHLORIDE 10 MEQ: 7.46 INJECTION, SOLUTION INTRAVENOUS at 12:00

## 2022-12-17 RX ADMIN — HYDROMORPHONE HYDROCHLORIDE 0.5 MG: 1 INJECTION, SOLUTION INTRAMUSCULAR; INTRAVENOUS; SUBCUTANEOUS at 12:38

## 2022-12-17 RX ADMIN — HYDROMORPHONE HYDROCHLORIDE 0.5 MG: 1 INJECTION, SOLUTION INTRAMUSCULAR; INTRAVENOUS; SUBCUTANEOUS at 02:16

## 2022-12-17 RX ADMIN — DEXTROSE, SODIUM CHLORIDE, AND POTASSIUM CHLORIDE: 5; .45; .15 INJECTION INTRAVENOUS at 12:56

## 2022-12-17 RX ADMIN — POTASSIUM CHLORIDE 10 MEQ: 7.46 INJECTION, SOLUTION INTRAVENOUS at 03:22

## 2022-12-17 RX ADMIN — OSELTAMIVIR PHOSPHATE 30 MG: 6 POWDER, FOR SUSPENSION ORAL at 07:55

## 2022-12-17 RX ADMIN — POTASSIUM CHLORIDE 10 MEQ: 7.46 INJECTION, SOLUTION INTRAVENOUS at 19:51

## 2022-12-17 RX ADMIN — HYDROMORPHONE HYDROCHLORIDE 0.5 MG: 1 INJECTION, SOLUTION INTRAMUSCULAR; INTRAVENOUS; SUBCUTANEOUS at 20:56

## 2022-12-17 RX ADMIN — ONDANSETRON 4 MG: 2 INJECTION INTRAMUSCULAR; INTRAVENOUS at 20:56

## 2022-12-17 RX ADMIN — SODIUM PHOSPHATE, MONOBASIC, MONOHYDRATE AND SODIUM PHOSPHATE, DIBASIC, ANHYDROUS 15 MMOL: 142; 276 INJECTION, SOLUTION INTRAVENOUS at 11:34

## 2022-12-17 RX ADMIN — Medication 40 MG: at 20:56

## 2022-12-17 RX ADMIN — POTASSIUM CHLORIDE 10 MEQ: 7.46 INJECTION, SOLUTION INTRAVENOUS at 10:54

## 2022-12-17 RX ADMIN — HYDROMORPHONE HYDROCHLORIDE 0.5 MG: 1 INJECTION, SOLUTION INTRAMUSCULAR; INTRAVENOUS; SUBCUTANEOUS at 07:54

## 2022-12-17 RX ADMIN — SODIUM CHLORIDE 953 ML: 9 INJECTION, SOLUTION INTRAVENOUS at 00:46

## 2022-12-17 RX ADMIN — POTASSIUM CHLORIDE 10 MEQ: 7.46 INJECTION, SOLUTION INTRAVENOUS at 23:43

## 2022-12-17 RX ADMIN — PIPERACILLIN AND TAZOBACTAM 4500 MG: 4; .5 INJECTION, POWDER, FOR SOLUTION INTRAVENOUS at 13:00

## 2022-12-17 RX ADMIN — MAGNESIUM SULFATE HEPTAHYDRATE 1000 MG: 1 INJECTION, SOLUTION INTRAVENOUS at 20:13

## 2022-12-17 RX ADMIN — HYDROMORPHONE HYDROCHLORIDE 0.5 MG: 1 INJECTION, SOLUTION INTRAMUSCULAR; INTRAVENOUS; SUBCUTANEOUS at 16:51

## 2022-12-17 ASSESSMENT — PAIN DESCRIPTION - DESCRIPTORS
DESCRIPTORS: ACHING;SORE
DESCRIPTORS: ACHING

## 2022-12-17 ASSESSMENT — PAIN DESCRIPTION - LOCATION
LOCATION: NECK;SHOULDER;BACK
LOCATION: BACK;NECK
LOCATION: BACK

## 2022-12-17 ASSESSMENT — PAIN DESCRIPTION - ORIENTATION
ORIENTATION: LOWER
ORIENTATION: OTHER (COMMENT)
ORIENTATION: LOWER
ORIENTATION: LOWER

## 2022-12-17 ASSESSMENT — PAIN - FUNCTIONAL ASSESSMENT
PAIN_FUNCTIONAL_ASSESSMENT: ACTIVITIES ARE NOT PREVENTED
PAIN_FUNCTIONAL_ASSESSMENT: ACTIVITIES ARE NOT PREVENTED
PAIN_FUNCTIONAL_ASSESSMENT: PREVENTS OR INTERFERES SOME ACTIVE ACTIVITIES AND ADLS
PAIN_FUNCTIONAL_ASSESSMENT: ACTIVITIES ARE NOT PREVENTED

## 2022-12-17 ASSESSMENT — PAIN SCALES - GENERAL
PAINLEVEL_OUTOF10: 8
PAINLEVEL_OUTOF10: 2
PAINLEVEL_OUTOF10: 4
PAINLEVEL_OUTOF10: 8

## 2022-12-17 ASSESSMENT — PAIN DESCRIPTION - ONSET: ONSET: ON-GOING

## 2022-12-17 ASSESSMENT — PAIN DESCRIPTION - PAIN TYPE: TYPE: ACUTE PAIN

## 2022-12-17 ASSESSMENT — PAIN DESCRIPTION - FREQUENCY: FREQUENCY: INTERMITTENT

## 2022-12-17 NOTE — PLAN OF CARE
Nutrition Problem #1: Inadequate oral intake, Altered nutrition-related lab values  Intervention: Food and/or Nutrient Delivery: Continue NPO  Nutritional

## 2022-12-17 NOTE — PROGRESS NOTES
Hospitalist Progress Note  [facility]     Patient: Josh Bachelor  : 1980  MRN: [id number]  Code Status: Full Code    Hospital Day: 1   Date of Service: 2022    Subjective:   Patient seen and examined. No current complaints. Past Medical History:   Diagnosis Date    Chronic kidney disease     COVID-19     Hypertension     Type 1 diabetes ([address]        Past Surgical History:   Procedure Laterality Date    ADENOIDECTOMY         History reviewed. No pertinent family history.     Social History     Socioeconomic History    Marital status: Single     Spouse name: Not on file    Number of children: Not on file    Years of education: Not on file    Highest education level: Not on file   Occupational History    Not on file   Tobacco Use    Smoking status: Every Day     Packs/day: 0.50     Types: Cigarettes    Smokeless tobacco: Never   Vaping Use    Vaping Use: Every day   Substance and Sexual Activity    Alcohol use: Not Currently    Drug use: Not Currently    Sexual activity: Not on file   Other Topics Concern    Not on file   Social History Narrative    Not on file     Social Determinants of Health     Financial Resource Strain: Not on file   Food Insecurity: Not on file   Transportation Needs: Not on file   Physical Activity: Not on file   Stress: Not on file   Social Connections: Not on file   Intimate Partner Violence: Not on file   Housing Stability: Not on file       Current Facility-Administered Medications   Medication Dose Route Frequency Provider Last Rate Last Admin    naloxegol (MOVANTIK) tablet 12.5 mg  12.5 mg Oral QAM AC Tony Tavarez MD   12.5 mg at 22 0754    glucose chewable tablet 16 g  4 tablet Oral PRN Zachery Turk MD        dextrose bolus 10% 125 mL  125 mL IntraVENous PRN Zachery Turk MD        Or    dextrose bolus 10% 250 mL  250 mL IntraVENous PRN Zachery Turk MD        glucagon (rDNA) injection 1 mg  1 mg SubCUTAneous PRN Andrei Macario MD        dextrose 10 % infusion   IntraVENous Continuous PRN Andrei Macario MD        HYDROmorphone HCl PF (DILAUDID) injection 0.5 mg  0.5 mg IntraVENous Q30 Min PRN Andrei Macario MD   0.5 mg at 12/16/22 2210    dextrose bolus 10% 125 mL  125 mL IntraVENous PRN Danis Donohue MD        Or    dextrose bolus 10% 250 mL  250 mL IntraVENous PRN Danis Donohue MD        potassium chloride 10 mEq/100 mL IVPB (Peripheral Line)  10 mEq IntraVENous PRN Danis Donohue  mL/hr at 12/17/22 1054 10 mEq at 12/17/22 1054    magnesium sulfate 1000 mg in dextrose 5% 100 mL IVPB  1,000 mg IntraVENous PRN Danis Donohue MD        sodium phosphate 10 mmol in sodium chloride 0.9 % 250 mL IVPB  10 mmol IntraVENous PRN Danis Donohue MD        Or    sodium phosphate 15 mmol in dextrose 5 % 250 mL IVPB  15 mmol IntraVENous PRN Danis Donohue MD 62.5 mL/hr at 12/17/22 1134 15 mmol at 12/17/22 1134    Or    sodium phosphate 20 mmol in dextrose 5 % 500 mL IVPB  20 mmol IntraVENous PRN Danis Donohue MD        polyethylene glycol (GLYCOLAX) packet 17 g  17 g Oral Daily PRN Danis Donohue MD        enoxaparin Sodium (LOVENOX) injection 30 mg  30 mg SubCUTAneous Daily Danis Donohue MD   30 mg at 12/17/22 0755    0.9 % sodium chloride infusion   IntraVENous Continuous Danis Donohue  mL/hr at 12/17/22 0207 New Bag at 12/17/22 0207    dextrose 5 % and 0.45 % NaCl with KCl 20 mEq infusion   IntraVENous Continuous PRN Danis Donohue  mL/hr at 12/17/22 0635 New Bag at 12/17/22 0635    insulin regular (HUMULIN R;NOVOLIN R) 100 Units in sodium chloride 0.9 % 100 mL infusion  0.01-0.5 Units/kg/hr IntraVENous Continuous Danis Donohue MD 11.4 mL/hr at 12/17/22 1057 0.18 Units/kg/hr at 12/17/22 1057    nicotine (NICODERM CQ) 7 MG/24HR 1 patch  1 patch TransDERmal Daily Danis Donohue MD   1 CHEST PORTABLE    Result Date: 12/17/2022  PROCEDURE: XR CHEST PORTABLE CLINICAL HISTORY: DKA. COMPARISON: No prior similar images available for comparison. FINDINGS: Mild pulmonary emphysematous changes are evident. There are multiple patchy airspace opacities opacity in the lingular and bilateral lower lobes. Heart is not enlarged. There are no pulmonary vascular congestion changes. There is no pneumothorax. Left costophrenic angle is clear. Right costophrenic angle is obscured representing pleural effusion with underlying segmental/subsegmental atelectatic changes. Visualized osseous structures are intact. Multifocal pneumonia and/or volume overload with small right-sided pleural effusion.      Assessment and Plan:   DKA  Influenza A infection  Multifocal pneumonia possibly secondary to above  Rhinovirus infection  PETROS  Tobacco dependence    Admits to noncompliance with insulin counseled  Insulin gtt  IVF  BMP/mag/Phos every 4 hours  Accu-Cheks every hour  Follow high anion gap metabolic acidosis to correction  HbA1c 12.3 poor control counseled  Oseltamavir  Broad-spectrum antibiotics  Follow cultures  Unsure of baseline creatinine  Creatinine slowly improving  Continue IVF  Counseled on cessation of tobacco  Lovenox for DVT prophylaxis  Discussed treatment plan with patient/RN    Total critical care time: 46 minutes    Bienvenido Elizalde MD   12/17/2022 12:26 PM

## 2022-12-17 NOTE — PROGRESS NOTES
Recent Labs     12/16/22 1915 12/17/22  0109   BUN 91* 83*       Recent Labs     12/16/22 1915 12/17/22  0109   CREATININE 3.8* 3.1*       Estimated Creatinine Clearance: 25 mL/min (A) (based on SCr of 3.1 mg/dL (H)).       Plan: Changed Tamiflu 75mg BID to 30mg Daily x5 days due to decline in patients renal function    Electronically signed by Mary Miller, [facility] on 12/17/2022 at 5:10 AM

## 2022-12-17 NOTE — PROGRESS NOTES
[address] [facility] Pharmacokinetic Monitoring Service - Vancomycin     Trisha Hodge is a 38 y.o. male starting on vancomycin therapy for Pneumonia (CAP). Pharmacy consulted by Dr. Presley Mariscal for monitoring and adjustment. Target Concentration: Goal AUC/TYRA 400-600 mg*hr/L    Additional Antimicrobials: Zosyn, Doxycyline    Pertinent Laboratory Values: Wt Readings from Last 1 Encounters:   12/17/22 127 lb 9.6 oz (57.9 kg)     Temp Readings from Last 1 Encounters:   12/17/22 98 °F (36.7 °C) (Temporal)     Estimated Creatinine Clearance: 29 mL/min (A) (based on SCr of 2.7 mg/dL (H)). Recent Labs     12/16/22  1915 12/17/22  0109 12/17/22  0442 12/17/22  0931   CREATININE 3.8*   < > 2.8* 2.7*   WBC 16.1*  --  5.0  --     < > = values in this interval not displayed. Procalcitonin: N/A    Pertinent Cultures:  Culture Date Source Results   12/16/22 Respiratory panel Rhinovirus  Influenza A   12/17/22 Blood X2 sent   MRSA Nasal Swab: was ordered by provider, awaiting results. Plan:  Concentration-guided dosing due to renal impairment/insufficiency  Start vancomycin with loading dose of 1250 mg IV X1  Renal labs as indicated.    Vancomycin concentration ordered for 12/18 @ 1330  Pharmacy will continue to monitor patient and adjust therapy as indicated    Thank you for the consult,  Christo Licea, [facility]  12/17/2022 1:45 PM
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
## 2022-12-17 NOTE — PROGRESS NOTES
Comprehensive Nutrition Assessment    Type and Reason for Visit:  Initial    Nutrition Recommendations/Plan:   Follow for advancing diet, po intake, tolerance, labs     Malnutrition Assessment:  Malnutrition Status:  Severe malnutrition (12/17/22 0807)    Context:  Chronic Illness     Findings of the 6 clinical characteristics of malnutrition:  Energy Intake:  Mild decrease in energy intake (Comment)  Weight Loss:  Greater than 7.5% over 3 months     Body Fat Loss:  Severe body fat loss Orbital, Buccal region   Muscle Mass Loss:  Mild muscle mass loss    Fluid Accumulation:  No significant fluid accumulation Extremities   Strength:  Not Performed    Nutrition Assessment:    Following pt for low BMI = 17.3. At present time pt is NPO. Nutrition Related Findings:    A1C 12.3 Wound Type: None       Current Nutrition Intake & Therapies:    Average Meal Intake: NPO  Average Supplements Intake: NPO  Diet NPO Exceptions are: Sips of Water with Meds    Anthropometric Measures:  Height: 6' (182.9 cm)  Ideal Body Weight (IBW): 178 lbs (81 kg)    Admission Body Weight: 140 lb (63.5 kg) (stated)  Current Body Weight: 127 lb 9.6 oz (57.9 kg), 71.7 % IBW.     Current BMI (kg/m2): 17.3  Usual Body Weight: 150 lb (68 kg)  % Weight Change (Calculated): -14.9  BMI Categories: Underweight (BMI less than 18.5)    Estimated Daily Nutrient Needs:  Energy Requirements Based On: Kcal/kg  Weight Used for Energy Requirements: Current  Energy (kcal/day): 1572-7575 kcals (25-30 kcals/kg)  Weight Used for Protein Requirements: Current  Protein (g/day): 98g  Method Used for Fluid Requirements: 1 ml/kcal  Fluid (ml/day): 1998-0095 ml    Nutrition Diagnosis:   Inadequate oral intake, Altered nutrition-related lab values related to acute injury/trauma, endocrine dysfuntion as evidenced by NPO or clear liquid status due to medical condition, lab values    Nutrition Interventions:   Food and/or Nutrient Delivery: Continue NPO  Nutrition Education/Counseling: No recommendation at this time  Coordination of Nutrition Care: Continue to monitor while inpatient       Goals:     Goals: Meet at least 75% of estimated needs       Nutrition Monitoring and Evaluation:   Behavioral-Environmental Outcomes: None Identified  Food/Nutrient Intake Outcomes: Diet Advancement/Tolerance, Food and Nutrient Intake  Physical Signs/Symptoms Outcomes: Biochemical Data, Nausea or Vomiting, Fluid Status or Edema, Weight, Skin    Discharge Planning:     Too soon to determine     Jennifer Chandra MS, RD, LD  Contact: [phone]

## 2022-12-17 NOTE — H&P
History and Physical    Patient Name:  Ignacia Patient    :  1980    Chief Complaint:   Cough and generalized pain     History of Present Illness:   Ignacia Patient with PMH DM1 insulin dependent, DKA, Covid infection that lead to arrest and intubations in [address] for 5 months, HTN, presents to [facility] complaining of generalized muscle aches, but mainly pain in the back and neck, 8/10, dull, for several days. Claims did not take any medications as OP. Also complains of one week history of productive cough, currently dry, associated with fever of 104 and chills. Pt denies any chest pain, wheezing, palpitations, N/V, abd pain, D/C, urinary frequency or urgency, rash. On admission tachycardic, Labs consistant with DKA, glucose 745, moderate acetone, pH 7.12  CXR Multifocal pneumonia   Respiratory panel positive for Influenza and Human Rhinovirus    Past Medical History:   has a past medical history of Chronic kidney disease, COVID-19, Hypertension, and Type 1 diabetes ([facility] [address] Surgical History:   has a past surgical history that includes Adenoidectomy. Social History:   reports that he has been smoking cigarettes. He has been smoking an average of .5 packs per day. He has never used smokeless tobacco. He reports that he does not currently use alcohol. He reports that he does not currently use drugs. Family History:  Father HTN    Medications:  Prior to Admission medications    Medication Sig Start Date End Date Taking? Authorizing Provider   insulin glargine (LANTUS) 100 UNIT/ML injection vial Inject 22 Units into the skin every morning   Yes Historical Provider, MD   insulin aspart (NOVOLOG) 100 UNIT/ML injection vial Inject into the skin 3 times daily Indications: sliding scale   Yes Historical Provider, MD       Allergies:  Patient has no known allergies. Review of Systems:   Constitutional: there has been unanticipated weight loss.  There's been change in energy level, activity level. Eyes: No visual changes or diplopia. No scleral icterus. ENT: No Headaches, hearing loss or vertigo. No mouth sores or sore throat. Cardiovascular: No chest pain, palpitations or loss of consciousness. No pleuritic pain or phlebitis. No orthopnea, PND or peripheral edema. Respiratory: cough sputum production. No hemoptysis. No dyspnea     Gastrointestinal: No abdominal pain, blood in stools. No change in bowel habits. No N/V. Genitourinary: No dysuria, trouble voiding, or hematuria. Musculoskeletal:  No gait disturbance, weakness or joint complaints. Generalized muscle aches and back pain  Integumentary: No rash or pruritis. Neurological: No headache, diplopia, change in muscle strength, numbness or tingling. No change in gait, balance, coordination. Psychiatric: No anxiety, or depression. Endocrine: No temperature intolerance. excessive thirst, tremor. Hematologic/Lymphatic: No abnormal bruising or bleeding, blood clots or swollen lymph nodes. Allergic/Immunologic: No nasal congestion or hives. Physical Examination:    Vital Signs: BP (!) 136/98   Pulse (!) 119   Temp 97.7 °F (36.5 °C) (Temporal)   Resp 28   Ht 6' (1.829 m)   Wt 140 lb (63.5 kg)   SpO2 92%   BMI 18.99 kg/m²   General appearance: cachectic, alert, moderate distress. Skin: Skin color, texture, turgor normal. No rashes or lesions. No induration or tightening palpated. Head: Normocephalic. No masses, lesions, tenderness or abnormalities  Eyes: conjunctivae/corneas clear. EOM's intact. Sclera non icteric. Ears: External ears normal.  Hearing normal to finger rub. Nose/Sinuses: Nares normal. Septum midline. Mucosa normal. No drainage or sinus tenderness. Oropharynx: Lips, mucosa, and tongue dry. Oropharynx clear with no exudate seen. Neck: Neck supple, and symmetric. No adenopathy. Trachea is midline. Carotids brisk in upstroke without bruits, No abnormal JVP noted at 45°.     Lungs: Lungs clear to auscultation bilaterally. No retractions or use of accessory muscles. No vocal fremitus. No ronchi, crackle or rale. Heart:  S1 > S2. Regular rate and rhythm. No gallop, murmur, rub, palpable thrill or heave noted. Abdomen: Abdomen soft, non-tender. BS normal. No masses, organomegaly. No hernia noted. Extremities: Extremities normal. No deformities, edema, or skin discoloration. No cyanosis or clubbing noted to the nails. Peripheral pulses 4/4. Musculoskeletal: Spine ROM normal. Muscular strength intact. Neuro: Cranial nerves intact. Motor: Strength 5/5 in all extremities. No focal weakness. Sensory: grossly normal to touch. Pertinent Labs:  CBC:   Recent Labs     12/16/22 1915   WBC 16.1*   RBC 4.41*   HGB 13.2*   HCT 42.8   MCV 97.1*   MCH 29.9   MCHC 30.8*   RDW 12.6      MPV 11.5     BMP:   Recent Labs     12/16/22 1915 12/16/22 2206   *  --    K 5.7* 4.8   CL 80*  --    CO2 5*  --    BUN 91*  --    CREATININE 3.8*  --    GLUCOSE 745* 584*   CALCIUM 10.1*  --      ABGs: No results for input(s): PO2, PCO2, PH, HCO3, BE, O2SAT in the last 72 hours. INR: No results for input(s): INR, PROTIME in the last 72 hours. BNP:  No results found for: BNP  TSH:   Lab Results   Component Value Date    TSH 2.170 12/16/2022     Cardiac Injury Profile: No results found for: CKTOTAL, CKMB, CKMBINDEX, TROPONINI  Lipid Profile: No components found for: CHLPL  No results found for: TRIG  No results found for: HDL  No results found for: LDLCALC  No results found for: LABVLDL  Hemoglobin A1C:   Lab Results   Component Value Date    LABA1C 12.3 (H) 12/16/2022     No results found for: EAG      Assessment/Plan:  DKA, type 1- IVF, insulin gtt, DKA protocol  Influenza PNA- tamiflu  Rhinovirus infection   Tobacco abuse - nicotine patch  Generalized muscle aches- IV pain control                 I have reviewed my findings and recommendations in detail with Yvette Garcia.     Danis Donohue MD

## 2022-12-18 ENCOUNTER — APPOINTMENT (OUTPATIENT)
Dept: GENERAL RADIOLOGY | Age: 42
DRG: 637 | End: 2022-12-18
Payer: COMMERCIAL

## 2022-12-18 LAB
ACINETOBACTER CALCOAC BAUMANNII COMPLEX BY PCR: NOT DETECTED
ANION GAP SERPL CALCULATED.3IONS-SCNC: 12 MMOL/L (ref 7–19)
ANION GAP SERPL CALCULATED.3IONS-SCNC: 12 MMOL/L (ref 7–19)
ANION GAP SERPL CALCULATED.3IONS-SCNC: 14 MMOL/L (ref 7–19)
ANION GAP SERPL CALCULATED.3IONS-SCNC: 9 MMOL/L (ref 7–19)
BACTEROIDES FRAGILIS BY PCR: NOT DETECTED
BUN BLDV-MCNC: 34 MG/DL (ref 6–20)
BUN BLDV-MCNC: 38 MG/DL (ref 6–20)
BUN BLDV-MCNC: 45 MG/DL (ref 6–20)
BUN BLDV-MCNC: 49 MG/DL (ref 6–20)
CALCIUM SERPL-MCNC: 6.5 MG/DL (ref 8.6–10)
CALCIUM SERPL-MCNC: 8.9 MG/DL (ref 8.6–10)
CALCIUM SERPL-MCNC: 9.3 MG/DL (ref 8.6–10)
CALCIUM SERPL-MCNC: 9.4 MG/DL (ref 8.6–10)
CANDIDA ALBICANS BY PCR: NOT DETECTED
CANDIDA AURIS BY PCR: NOT DETECTED
CANDIDA GLABRATA BY PCR: NOT DETECTED
CANDIDA KRUSEI BY PCR: NOT DETECTED
CANDIDA PARAPSILOSIS BY PCR: NOT DETECTED
CANDIDA TROPICALIS BY PCR: NOT DETECTED
CHLORIDE BLD-SCNC: 100 MMOL/L (ref 98–111)
CHLORIDE BLD-SCNC: 100 MMOL/L (ref 98–111)
CHLORIDE BLD-SCNC: 101 MMOL/L (ref 98–111)
CHLORIDE BLD-SCNC: 111 MMOL/L (ref 98–111)
CO2: 13 MMOL/L (ref 22–29)
CO2: 16 MMOL/L (ref 22–29)
CO2: 17 MMOL/L (ref 22–29)
CO2: 18 MMOL/L (ref 22–29)
CREAT SERPL-MCNC: 1.4 MG/DL (ref 0.5–1.2)
CREAT SERPL-MCNC: 1.9 MG/DL (ref 0.5–1.2)
CREAT SERPL-MCNC: 1.9 MG/DL (ref 0.5–1.2)
CREAT SERPL-MCNC: 2.1 MG/DL (ref 0.5–1.2)
CRYPTOCOCCUS NEOFORMANS/GATTII BY PCR: NOT DETECTED
ENTEROBACTER CLOACAE COMPLEX BY PCR: NOT DETECTED
ENTEROBACTERALES BY PCR: NOT DETECTED
ENTEROCOCCUS FAECALIS BY PCR: NOT DETECTED
ENTEROCOCCUS FAECIUM BY PCR: NOT DETECTED
ESCHERICHIA COLI BY PCR: NOT DETECTED
GFR SERPL CREATININE-BSD FRML MDRD: 39 ML/MIN/{1.73_M2}
GFR SERPL CREATININE-BSD FRML MDRD: 44 ML/MIN/{1.73_M2}
GFR SERPL CREATININE-BSD FRML MDRD: 44 ML/MIN/{1.73_M2}
GFR SERPL CREATININE-BSD FRML MDRD: >60 ML/MIN/{1.73_M2}
GLUCOSE BLD-MCNC: 124 MG/DL (ref 70–99)
GLUCOSE BLD-MCNC: 128 MG/DL (ref 70–99)
GLUCOSE BLD-MCNC: 178 MG/DL (ref 70–99)
GLUCOSE BLD-MCNC: 189 MG/DL (ref 70–99)
GLUCOSE BLD-MCNC: 190 MG/DL (ref 70–99)
GLUCOSE BLD-MCNC: 199 MG/DL (ref 70–99)
GLUCOSE BLD-MCNC: 199 MG/DL (ref 74–109)
GLUCOSE BLD-MCNC: 207 MG/DL (ref 70–99)
GLUCOSE BLD-MCNC: 226 MG/DL (ref 74–109)
GLUCOSE BLD-MCNC: 231 MG/DL (ref 70–99)
GLUCOSE BLD-MCNC: 241 MG/DL (ref 70–99)
GLUCOSE BLD-MCNC: 247 MG/DL (ref 70–99)
GLUCOSE BLD-MCNC: 269 MG/DL (ref 74–109)
GLUCOSE BLD-MCNC: 294 MG/DL (ref 74–109)
GLUCOSE BLD-MCNC: 295 MG/DL (ref 70–99)
GLUCOSE BLD-MCNC: 295 MG/DL (ref 70–99)
GLUCOSE BLD-MCNC: 315 MG/DL (ref 70–99)
GLUCOSE BLD-MCNC: 369 MG/DL (ref 70–99)
GLUCOSE BLD-MCNC: 369 MG/DL (ref 70–99)
GLUCOSE BLD-MCNC: 371 MG/DL (ref 70–99)
GLUCOSE BLD-MCNC: 95 MG/DL (ref 70–99)
HAEMOPHILUS INFLUENZAE BY PCR: NOT DETECTED
HCT VFR BLD CALC: 26.4 % (ref 42–52)
HEMOGLOBIN: 9 G/DL (ref 14–18)
KLEBSIELLA AEROGENES BY PCR: NOT DETECTED
KLEBSIELLA OXYTOCA BY PCR: NOT DETECTED
KLEBSIELLA PNEUMONIAE GROUP BY PCR: NOT DETECTED
LISTERIA MONOCYTOGENES BY PCR: NOT DETECTED
MAGNESIUM: 1.3 MG/DL (ref 1.6–2.6)
MAGNESIUM: 1.8 MG/DL (ref 1.6–2.6)
MAGNESIUM: 1.9 MG/DL (ref 1.6–2.6)
MAGNESIUM: 2.8 MG/DL (ref 1.6–2.6)
MCH RBC QN AUTO: 30.1 PG (ref 27–31)
MCHC RBC AUTO-ENTMCNC: 34.1 G/DL (ref 33–37)
MCV RBC AUTO: 88.3 FL (ref 80–94)
METHICILLIN RESISTANCE MECA/C AND MREJ BY PCR: DETECTED
MRSA SCREEN RT-PCR: DETECTED
NEISSERIA MENINGITIDIS BY PCR: NOT DETECTED
PDW BLD-RTO: 12.7 % (ref 11.5–14.5)
PERFORMED ON: ABNORMAL
PERFORMED ON: NORMAL
PHOSPHORUS: 1.7 MG/DL (ref 2.5–4.5)
PHOSPHORUS: 2.5 MG/DL (ref 2.5–4.5)
PHOSPHORUS: 2.9 MG/DL (ref 2.5–4.5)
PHOSPHORUS: 3.1 MG/DL (ref 2.5–4.5)
PLATELET # BLD: 298 K/UL (ref 130–400)
PMV BLD AUTO: 11.1 FL (ref 9.4–12.4)
POTASSIUM SERPL-SCNC: 3.6 MMOL/L (ref 3.5–5)
POTASSIUM SERPL-SCNC: 4.6 MMOL/L (ref 3.5–5)
POTASSIUM SERPL-SCNC: 4.6 MMOL/L (ref 3.5–5)
POTASSIUM SERPL-SCNC: 5 MMOL/L (ref 3.5–5)
PROTEUS SPECIES BY PCR: NOT DETECTED
PSEUDOMONAS AERUGINOSA BY PCR: NOT DETECTED
RBC # BLD: 2.99 M/UL (ref 4.7–6.1)
SALMONELLA SPECIES BY PCR: NOT DETECTED
SERRATIA MARCESCENS BY PCR: NOT DETECTED
SODIUM BLD-SCNC: 129 MMOL/L (ref 136–145)
SODIUM BLD-SCNC: 130 MMOL/L (ref 136–145)
SODIUM BLD-SCNC: 131 MMOL/L (ref 136–145)
SODIUM BLD-SCNC: 133 MMOL/L (ref 136–145)
STAPHYLOCOCCUS AUREUS BY PCR: DETECTED
STAPHYLOCOCCUS EPIDERMIDIS BY PCR: NOT DETECTED
STAPHYLOCOCCUS LUGDUNENSIS BY PCR: NOT DETECTED
STAPHYLOCOCCUS SPECIES BY PCR: DETECTED
STENOTROPHOMONAS MALTOPHILIA BY PCR: NOT DETECTED
STREPTOCOCCUS AGALACTIAE BY PCR: NOT DETECTED
STREPTOCOCCUS PNEUMONIAE BY PCR: NOT DETECTED
STREPTOCOCCUS PYOGENES  BY PCR: NOT DETECTED
STREPTOCOCCUS SPECIES BY PCR: NOT DETECTED
VANCOMYCIN TROUGH: 10.1 UG/ML (ref 10–20)
WBC # BLD: 22.4 K/UL (ref 4.8–10.8)

## 2022-12-18 PROCEDURE — 94760 N-INVAS EAR/PLS OXIMETRY 1: CPT

## 2022-12-18 PROCEDURE — 2580000003 HC RX 258: Performed by: INTERNAL MEDICINE

## 2022-12-18 PROCEDURE — 83735 ASSAY OF MAGNESIUM: CPT

## 2022-12-18 PROCEDURE — 2000000000 HC ICU R&B

## 2022-12-18 PROCEDURE — 2500000003 HC RX 250 WO HCPCS: Performed by: HOSPITALIST

## 2022-12-18 PROCEDURE — 2500000003 HC RX 250 WO HCPCS: Performed by: INTERNAL MEDICINE

## 2022-12-18 PROCEDURE — 85027 COMPLETE CBC AUTOMATED: CPT

## 2022-12-18 PROCEDURE — C9113 INJ PANTOPRAZOLE SODIUM, VIA: HCPCS | Performed by: HOSPITALIST

## 2022-12-18 PROCEDURE — 6370000000 HC RX 637 (ALT 250 FOR IP): Performed by: HOSPITALIST

## 2022-12-18 PROCEDURE — 80048 BASIC METABOLIC PNL TOTAL CA: CPT

## 2022-12-18 PROCEDURE — 6360000002 HC RX W HCPCS: Performed by: HOSPITALIST

## 2022-12-18 PROCEDURE — 84100 ASSAY OF PHOSPHORUS: CPT

## 2022-12-18 PROCEDURE — 36415 COLL VENOUS BLD VENIPUNCTURE: CPT

## 2022-12-18 PROCEDURE — 6370000000 HC RX 637 (ALT 250 FOR IP): Performed by: INTERNAL MEDICINE

## 2022-12-18 PROCEDURE — 74018 RADEX ABDOMEN 1 VIEW: CPT

## 2022-12-18 PROCEDURE — 2700000000 HC OXYGEN THERAPY PER DAY

## 2022-12-18 PROCEDURE — 74018 RADEX ABDOMEN 1 VIEW: CPT | Performed by: RADIOLOGY

## 2022-12-18 PROCEDURE — 2580000003 HC RX 258: Performed by: HOSPITALIST

## 2022-12-18 PROCEDURE — 80202 ASSAY OF VANCOMYCIN: CPT

## 2022-12-18 PROCEDURE — 6360000002 HC RX W HCPCS: Performed by: INTERNAL MEDICINE

## 2022-12-18 PROCEDURE — 82947 ASSAY GLUCOSE BLOOD QUANT: CPT

## 2022-12-18 RX ORDER — HYDROCODONE BITARTRATE AND ACETAMINOPHEN 5; 325 MG/1; MG/1
1 TABLET ORAL EVERY 6 HOURS PRN
Status: DISCONTINUED | OUTPATIENT
Start: 2022-12-18 | End: 2022-12-26 | Stop reason: HOSPADM

## 2022-12-18 RX ORDER — ENOXAPARIN SODIUM 100 MG/ML
40 INJECTION SUBCUTANEOUS DAILY
Status: DISCONTINUED | OUTPATIENT
Start: 2022-12-19 | End: 2022-12-26 | Stop reason: HOSPADM

## 2022-12-18 RX ORDER — CALCIUM GLUCONATE 20 MG/ML
1000 INJECTION, SOLUTION INTRAVENOUS ONCE
Status: COMPLETED | OUTPATIENT
Start: 2022-12-18 | End: 2022-12-18

## 2022-12-18 RX ORDER — GUAIFENESIN 600 MG/1
600 TABLET, EXTENDED RELEASE ORAL 2 TIMES DAILY
Status: DISCONTINUED | OUTPATIENT
Start: 2022-12-18 | End: 2022-12-26 | Stop reason: HOSPADM

## 2022-12-18 RX ORDER — INSULIN GLARGINE 100 [IU]/ML
20 INJECTION, SOLUTION SUBCUTANEOUS NIGHTLY
Status: DISCONTINUED | OUTPATIENT
Start: 2022-12-18 | End: 2022-12-20

## 2022-12-18 RX ORDER — INSULIN LISPRO 100 [IU]/ML
0-4 INJECTION, SOLUTION INTRAVENOUS; SUBCUTANEOUS
Status: DISCONTINUED | OUTPATIENT
Start: 2022-12-18 | End: 2022-12-26 | Stop reason: HOSPADM

## 2022-12-18 RX ORDER — POTASSIUM CHLORIDE 7.45 MG/ML
10 INJECTION INTRAVENOUS PRN
Status: DISCONTINUED | OUTPATIENT
Start: 2022-12-18 | End: 2022-12-26 | Stop reason: HOSPADM

## 2022-12-18 RX ORDER — POTASSIUM CHLORIDE 20 MEQ/1
40 TABLET, EXTENDED RELEASE ORAL PRN
Status: DISCONTINUED | OUTPATIENT
Start: 2022-12-18 | End: 2022-12-26 | Stop reason: HOSPADM

## 2022-12-18 RX ORDER — INSULIN LISPRO 100 [IU]/ML
0-4 INJECTION, SOLUTION INTRAVENOUS; SUBCUTANEOUS NIGHTLY
Status: DISCONTINUED | OUTPATIENT
Start: 2022-12-18 | End: 2022-12-26 | Stop reason: HOSPADM

## 2022-12-18 RX ORDER — INSULIN LISPRO 100 [IU]/ML
3 INJECTION, SOLUTION INTRAVENOUS; SUBCUTANEOUS
Status: DISCONTINUED | OUTPATIENT
Start: 2022-12-18 | End: 2022-12-20

## 2022-12-18 RX ORDER — BENZONATATE 100 MG/1
100 CAPSULE ORAL 3 TIMES DAILY PRN
Status: DISCONTINUED | OUTPATIENT
Start: 2022-12-18 | End: 2022-12-26 | Stop reason: HOSPADM

## 2022-12-18 RX ADMIN — MAGNESIUM SULFATE HEPTAHYDRATE 1000 MG: 1 INJECTION, SOLUTION INTRAVENOUS at 18:20

## 2022-12-18 RX ADMIN — HYDROMORPHONE HYDROCHLORIDE 0.5 MG: 1 INJECTION, SOLUTION INTRAMUSCULAR; INTRAVENOUS; SUBCUTANEOUS at 14:03

## 2022-12-18 RX ADMIN — PIPERACILLIN AND TAZOBACTAM 3375 MG: 3; .375 INJECTION, POWDER, FOR SOLUTION INTRAVENOUS at 04:21

## 2022-12-18 RX ADMIN — GUAIFENESIN 600 MG: 600 TABLET ORAL at 20:14

## 2022-12-18 RX ADMIN — INSULIN LISPRO 4 UNITS: 100 INJECTION, SOLUTION INTRAVENOUS; SUBCUTANEOUS at 16:53

## 2022-12-18 RX ADMIN — Medication 40 MG: at 20:14

## 2022-12-18 RX ADMIN — POTASSIUM CHLORIDE 10 MEQ: 7.46 INJECTION, SOLUTION INTRAVENOUS at 15:59

## 2022-12-18 RX ADMIN — GUAIFENESIN 600 MG: 600 TABLET ORAL at 09:11

## 2022-12-18 RX ADMIN — DEXTROSE, SODIUM CHLORIDE, AND POTASSIUM CHLORIDE: 5; .45; .15 INJECTION INTRAVENOUS at 09:33

## 2022-12-18 RX ADMIN — INSULIN LISPRO 3 UNITS: 100 INJECTION, SOLUTION INTRAVENOUS; SUBCUTANEOUS at 16:55

## 2022-12-18 RX ADMIN — MAGNESIUM SULFATE HEPTAHYDRATE 1000 MG: 1 INJECTION, SOLUTION INTRAVENOUS at 19:22

## 2022-12-18 RX ADMIN — CALCIUM GLUCONATE 1000 MG: 20 INJECTION, SOLUTION INTRAVENOUS at 16:32

## 2022-12-18 RX ADMIN — MAGNESIUM SULFATE HEPTAHYDRATE 1000 MG: 1 INJECTION, SOLUTION INTRAVENOUS at 16:23

## 2022-12-18 RX ADMIN — PIPERACILLIN AND TAZOBACTAM 3375 MG: 3; .375 INJECTION, POWDER, FOR SOLUTION INTRAVENOUS at 14:16

## 2022-12-18 RX ADMIN — NALOXEGOL OXALATE 12.5 MG: 12.5 TABLET, FILM COATED ORAL at 05:54

## 2022-12-18 RX ADMIN — PIPERACILLIN AND TAZOBACTAM 3375 MG: 3; .375 INJECTION, POWDER, FOR SOLUTION INTRAVENOUS at 20:19

## 2022-12-18 RX ADMIN — POTASSIUM CHLORIDE 10 MEQ: 7.46 INJECTION, SOLUTION INTRAVENOUS at 16:57

## 2022-12-18 RX ADMIN — DOXYCYCLINE 100 MG: 100 INJECTION, POWDER, LYOPHILIZED, FOR SOLUTION INTRAVENOUS at 02:04

## 2022-12-18 RX ADMIN — VANCOMYCIN HYDROCHLORIDE 1000 MG: 1 INJECTION, POWDER, LYOPHILIZED, FOR SOLUTION INTRAVENOUS at 14:58

## 2022-12-18 RX ADMIN — POTASSIUM CHLORIDE 10 MEQ: 7.46 INJECTION, SOLUTION INTRAVENOUS at 14:52

## 2022-12-18 RX ADMIN — INSULIN GLARGINE 20 UNITS: 100 INJECTION, SOLUTION SUBCUTANEOUS at 16:51

## 2022-12-18 RX ADMIN — ENOXAPARIN SODIUM 30 MG: 100 INJECTION SUBCUTANEOUS at 08:02

## 2022-12-18 RX ADMIN — POTASSIUM CHLORIDE 10 MEQ: 7.46 INJECTION, SOLUTION INTRAVENOUS at 06:37

## 2022-12-18 RX ADMIN — HYDROMORPHONE HYDROCHLORIDE 0.5 MG: 1 INJECTION, SOLUTION INTRAMUSCULAR; INTRAVENOUS; SUBCUTANEOUS at 05:57

## 2022-12-18 RX ADMIN — POTASSIUM CHLORIDE 10 MEQ: 7.46 INJECTION, SOLUTION INTRAVENOUS at 05:35

## 2022-12-18 RX ADMIN — SODIUM PHOSPHATE, MONOBASIC, MONOHYDRATE AND SODIUM PHOSPHATE, DIBASIC, ANHYDROUS 15 MMOL: 142; 276 INJECTION, SOLUTION INTRAVENOUS at 17:08

## 2022-12-18 RX ADMIN — DEXTROSE, SODIUM CHLORIDE, AND POTASSIUM CHLORIDE: 5; .45; .15 INJECTION INTRAVENOUS at 02:04

## 2022-12-18 RX ADMIN — POTASSIUM CHLORIDE 10 MEQ: 7.46 INJECTION, SOLUTION INTRAVENOUS at 00:55

## 2022-12-18 RX ADMIN — BENZONATATE 100 MG: 100 CAPSULE ORAL at 20:14

## 2022-12-18 RX ADMIN — MAGNESIUM SULFATE HEPTAHYDRATE 1000 MG: 1 INJECTION, SOLUTION INTRAVENOUS at 17:26

## 2022-12-18 RX ADMIN — HYDROCODONE BITARTRATE AND ACETAMINOPHEN 1 TABLET: 5; 325 TABLET ORAL at 18:26

## 2022-12-18 RX ADMIN — SODIUM CHLORIDE, POTASSIUM CHLORIDE, SODIUM LACTATE AND CALCIUM CHLORIDE: 600; 310; 30; 20 INJECTION, SOLUTION INTRAVENOUS at 23:59

## 2022-12-18 RX ADMIN — SODIUM CHLORIDE, POTASSIUM CHLORIDE, SODIUM LACTATE AND CALCIUM CHLORIDE: 600; 310; 30; 20 INJECTION, SOLUTION INTRAVENOUS at 09:04

## 2022-12-18 RX ADMIN — HYDROMORPHONE HYDROCHLORIDE 0.5 MG: 1 INJECTION, SOLUTION INTRAMUSCULAR; INTRAVENOUS; SUBCUTANEOUS at 02:02

## 2022-12-18 RX ADMIN — Medication 40 MG: at 08:02

## 2022-12-18 RX ADMIN — DOXYCYCLINE 100 MG: 100 INJECTION, POWDER, LYOPHILIZED, FOR SOLUTION INTRAVENOUS at 16:01

## 2022-12-18 RX ADMIN — BENZONATATE 100 MG: 100 CAPSULE ORAL at 09:11

## 2022-12-18 RX ADMIN — SODIUM CHLORIDE 0.02 UNITS/KG/HR: 9 INJECTION, SOLUTION INTRAVENOUS at 00:22

## 2022-12-18 RX ADMIN — OSELTAMIVIR PHOSPHATE 30 MG: 6 POWDER, FOR SUSPENSION ORAL at 08:01

## 2022-12-18 RX ADMIN — POTASSIUM CHLORIDE 10 MEQ: 7.46 INJECTION, SOLUTION INTRAVENOUS at 18:20

## 2022-12-18 ASSESSMENT — PAIN DESCRIPTION - DESCRIPTORS
DESCRIPTORS: ACHING;SORE
DESCRIPTORS: ACHING

## 2022-12-18 ASSESSMENT — PAIN DESCRIPTION - PAIN TYPE
TYPE: ACUTE PAIN
TYPE: ACUTE PAIN

## 2022-12-18 ASSESSMENT — PAIN DESCRIPTION - ORIENTATION
ORIENTATION: OTHER (COMMENT)
ORIENTATION: LOWER
ORIENTATION: MID
ORIENTATION: LOWER

## 2022-12-18 ASSESSMENT — PAIN SCALES - GENERAL
PAINLEVEL_OUTOF10: 2
PAINLEVEL_OUTOF10: 8
PAINLEVEL_OUTOF10: 7
PAINLEVEL_OUTOF10: 0
PAINLEVEL_OUTOF10: 7
PAINLEVEL_OUTOF10: 7
PAINLEVEL_OUTOF10: 0
PAINLEVEL_OUTOF10: 7
PAINLEVEL_OUTOF10: 0
PAINLEVEL_OUTOF10: 7
PAINLEVEL_OUTOF10: 0
PAINLEVEL_OUTOF10: 0

## 2022-12-18 ASSESSMENT — PAIN - FUNCTIONAL ASSESSMENT
PAIN_FUNCTIONAL_ASSESSMENT: ACTIVITIES ARE NOT PREVENTED

## 2022-12-18 ASSESSMENT — PAIN DESCRIPTION - LOCATION
LOCATION: BACK
LOCATION: BACK
LOCATION: BACK;NECK;SHOULDER
LOCATION: BACK

## 2022-12-18 ASSESSMENT — PAIN DESCRIPTION - ONSET
ONSET: GRADUAL
ONSET: ON-GOING

## 2022-12-18 ASSESSMENT — PAIN DESCRIPTION - FREQUENCY
FREQUENCY: INTERMITTENT
FREQUENCY: INTERMITTENT

## 2022-12-18 NOTE — PROGRESS NOTES
[address] Pharmacokinetic Monitoring Service - Vancomycin    Consulting Provider: Dr. Burgess Odom   Indication: Pneumonia (CAP)  Target Concentration: Goal AUC/TYRA 400-600 mg*hr/L  Day of Therapy: 2  Additional Antimicrobials: Zosyn,Doxycycline    Pertinent Laboratory Values: Wt Readings from Last 1 Encounters:   12/18/22 137 lb (62.1 kg)     Temp Readings from Last 1 Encounters:   12/18/22 98 °F (36.7 °C) (Temporal)     Estimated Creatinine Clearance: 44 mL/min (A) (based on SCr of 1.9 mg/dL (H)). Recent Labs     12/17/22  0442 12/17/22  0931 12/18/22  0421 12/18/22  1039   CREATININE 2.8*   < > 2.1* 1.9*   WBC 5.0  --  22.4*  --     < > = values in this interval not displayed. Procalcitonin: N/A    Pertinent Cultures:  Culture Date Source Results   12/16/22 Respiratory panel Rhinovirus  Influenza A   12/17/22 Blood X2 No growth   MRSA Nasal Swab: 12/16/22  detected      Recent vancomycin administrations                     vancomycin (VANCOCIN) 1,250 mg in dextrose 5 % 250 mL IVPB (mg) 1,250 mg New Bag 12/17/22 1543                    Assessment:  Date/Time Current Dose Concentration Timing of Concentration (h) AUC   12/18 @1316 Pulse dosing (1250mg) 10.1 21h 33m unknown   Note: Serum concentrations collected for AUC dosing may appear elevated if collected in close proximity to the dose administered, this is not necessarily an indication of toxicity    Plan:  Continue pulse dosing as renal recovers. Vancomycin 1000mg IV x1 now.   Repeat vancomycin concentration ordered for 12/19 @ 1400   Pharmacy will continue to monitor patient and adjust therapy as indicated    Thank you for the consult,  Sallie Jones [facility]  12/18/2022 2:00 PM

## 2022-12-18 NOTE — PROGRESS NOTES
Pharmacy Renal Dose Adjustment      Recent Labs     12/18/22  0421 12/18/22  1039   BUN 49* 45*       Recent Labs     12/18/22  0421 12/18/22  1039   CREATININE 2.1* 1.9*       Estimated Creatinine Clearance: 44 mL/min (A) (based on SCr of 1.9 mg/dL (H)). Plan: Changed Lovenox to 40 mg SC daily due to patients renal function. Pharmacy will continue to follow and make adjustments as needed.     Electronically signed by ABBIE Gaitan [facility] on 12/18/2022 at 11:11 AM

## 2022-12-19 LAB
ANION GAP SERPL CALCULATED.3IONS-SCNC: 13 MMOL/L (ref 7–19)
BANDED NEUTROPHILS RELATIVE PERCENT: 9 % (ref 0–5)
BASOPHILS ABSOLUTE: 0 K/UL (ref 0–0.2)
BASOPHILS RELATIVE PERCENT: 0 % (ref 0–1)
BUN BLDV-MCNC: 35 MG/DL (ref 6–20)
CALCIUM SERPL-MCNC: 9.2 MG/DL (ref 8.6–10)
CHLORIDE BLD-SCNC: 101 MMOL/L (ref 98–111)
CO2: 17 MMOL/L (ref 22–29)
CREAT SERPL-MCNC: 1.9 MG/DL (ref 0.5–1.2)
EKG P AXIS: 78 DEGREES
EKG P-R INTERVAL: 158 MS
EKG Q-T INTERVAL: 312 MS
EKG QRS DURATION: 90 MS
EKG QTC CALCULATION (BAZETT): 408 MS
EKG T AXIS: 45 DEGREES
EOSINOPHILS ABSOLUTE: 0 K/UL (ref 0–0.6)
EOSINOPHILS RELATIVE PERCENT: 0 % (ref 0–5)
GFR SERPL CREATININE-BSD FRML MDRD: 44 ML/MIN/{1.73_M2}
GLUCOSE BLD-MCNC: 205 MG/DL (ref 74–109)
GLUCOSE BLD-MCNC: 239 MG/DL (ref 70–99)
GLUCOSE BLD-MCNC: 249 MG/DL (ref 70–99)
GLUCOSE BLD-MCNC: 253 MG/DL (ref 70–99)
GLUCOSE BLD-MCNC: 257 MG/DL (ref 70–99)
HCT VFR BLD CALC: 27 % (ref 42–52)
HEMOGLOBIN: 9.2 G/DL (ref 14–18)
IMMATURE GRANULOCYTES #: 0.3 K/UL
LYMPHOCYTES ABSOLUTE: 0.7 K/UL (ref 1.1–4.5)
LYMPHOCYTES RELATIVE PERCENT: 3 % (ref 20–40)
MAGNESIUM: 2.4 MG/DL (ref 1.6–2.6)
MCH RBC QN AUTO: 30.5 PG (ref 27–31)
MCHC RBC AUTO-ENTMCNC: 34.1 G/DL (ref 33–37)
MCV RBC AUTO: 89.4 FL (ref 80–94)
MONOCYTES ABSOLUTE: 1.1 K/UL (ref 0–0.9)
MONOCYTES RELATIVE PERCENT: 5 % (ref 0–10)
NEUTROPHILS ABSOLUTE: 20.6 K/UL (ref 1.5–7.5)
NEUTROPHILS RELATIVE PERCENT: 83 % (ref 50–65)
PDW BLD-RTO: 13.1 % (ref 11.5–14.5)
PERFORMED ON: ABNORMAL
PLATELET # BLD: 289 K/UL (ref 130–400)
PLATELET SLIDE REVIEW: ADEQUATE
PMV BLD AUTO: 10.3 FL (ref 9.4–12.4)
POTASSIUM SERPL-SCNC: 4.5 MMOL/L (ref 3.5–5)
RBC # BLD: 3.02 M/UL (ref 4.7–6.1)
RBC # BLD: NORMAL 10*6/UL
SODIUM BLD-SCNC: 131 MMOL/L (ref 136–145)
TOXIC GRANULATION: ABNORMAL
VANCOMYCIN RANDOM: 8.7 UG/ML
WBC # BLD: 22.4 K/UL (ref 4.8–10.8)

## 2022-12-19 PROCEDURE — C8929 TTE W OR WO FOL WCON,DOPPLER: HCPCS

## 2022-12-19 PROCEDURE — 80048 BASIC METABOLIC PNL TOTAL CA: CPT

## 2022-12-19 PROCEDURE — 6370000000 HC RX 637 (ALT 250 FOR IP): Performed by: HOSPITALIST

## 2022-12-19 PROCEDURE — C9113 INJ PANTOPRAZOLE SODIUM, VIA: HCPCS | Performed by: HOSPITALIST

## 2022-12-19 PROCEDURE — 6370000000 HC RX 637 (ALT 250 FOR IP): Performed by: INTERNAL MEDICINE

## 2022-12-19 PROCEDURE — 2580000003 HC RX 258: Performed by: HOSPITALIST

## 2022-12-19 PROCEDURE — 36415 COLL VENOUS BLD VENIPUNCTURE: CPT

## 2022-12-19 PROCEDURE — 87040 BLOOD CULTURE FOR BACTERIA: CPT

## 2022-12-19 PROCEDURE — 83735 ASSAY OF MAGNESIUM: CPT

## 2022-12-19 PROCEDURE — 85025 COMPLETE CBC W/AUTO DIFF WBC: CPT

## 2022-12-19 PROCEDURE — 2500000003 HC RX 250 WO HCPCS: Performed by: INTERNAL MEDICINE

## 2022-12-19 PROCEDURE — 82947 ASSAY GLUCOSE BLOOD QUANT: CPT

## 2022-12-19 PROCEDURE — 94760 N-INVAS EAR/PLS OXIMETRY 1: CPT

## 2022-12-19 PROCEDURE — 2580000003 HC RX 258: Performed by: INTERNAL MEDICINE

## 2022-12-19 PROCEDURE — 86403 PARTICLE AGGLUT ANTBDY SCRN: CPT

## 2022-12-19 PROCEDURE — 80202 ASSAY OF VANCOMYCIN: CPT

## 2022-12-19 PROCEDURE — 6360000002 HC RX W HCPCS: Performed by: INTERNAL MEDICINE

## 2022-12-19 PROCEDURE — 93010 ELECTROCARDIOGRAM REPORT: CPT | Performed by: INTERNAL MEDICINE

## 2022-12-19 PROCEDURE — 6360000004 HC RX CONTRAST MEDICATION: Performed by: INTERNAL MEDICINE

## 2022-12-19 PROCEDURE — 1210000000 HC MED SURG R&B

## 2022-12-19 PROCEDURE — 6360000002 HC RX W HCPCS: Performed by: HOSPITALIST

## 2022-12-19 RX ORDER — OSELTAMIVIR PHOSPHATE 6 MG/ML
30 FOR SUSPENSION ORAL 2 TIMES DAILY
Status: COMPLETED | OUTPATIENT
Start: 2022-12-19 | End: 2022-12-21

## 2022-12-19 RX ADMIN — PIPERACILLIN AND TAZOBACTAM 3375 MG: 3; .375 INJECTION, POWDER, FOR SOLUTION INTRAVENOUS at 13:21

## 2022-12-19 RX ADMIN — DOXYCYCLINE 100 MG: 100 INJECTION, POWDER, LYOPHILIZED, FOR SOLUTION INTRAVENOUS at 02:12

## 2022-12-19 RX ADMIN — BENZONATATE 100 MG: 100 CAPSULE ORAL at 15:20

## 2022-12-19 RX ADMIN — HYDROCODONE BITARTRATE AND ACETAMINOPHEN 1 TABLET: 5; 325 TABLET ORAL at 07:49

## 2022-12-19 RX ADMIN — PERFLUTREN 1.5 ML: 6.52 INJECTION, SUSPENSION INTRAVENOUS at 10:43

## 2022-12-19 RX ADMIN — INSULIN LISPRO 2 UNITS: 100 INJECTION, SOLUTION INTRAVENOUS; SUBCUTANEOUS at 11:38

## 2022-12-19 RX ADMIN — Medication 40 MG: at 20:07

## 2022-12-19 RX ADMIN — Medication 1000 MG: at 16:21

## 2022-12-19 RX ADMIN — OSELTAMIVIR PHOSPHATE 30 MG: 6 POWDER, FOR SUSPENSION ORAL at 07:50

## 2022-12-19 RX ADMIN — NALOXEGOL OXALATE 12.5 MG: 12.5 TABLET, FILM COATED ORAL at 07:06

## 2022-12-19 RX ADMIN — INSULIN GLARGINE 20 UNITS: 100 INJECTION, SOLUTION SUBCUTANEOUS at 20:11

## 2022-12-19 RX ADMIN — Medication 40 MG: at 07:50

## 2022-12-19 RX ADMIN — INSULIN LISPRO 1 UNITS: 100 INJECTION, SOLUTION INTRAVENOUS; SUBCUTANEOUS at 17:57

## 2022-12-19 RX ADMIN — GUAIFENESIN 600 MG: 600 TABLET ORAL at 20:07

## 2022-12-19 RX ADMIN — INSULIN LISPRO 3 UNITS: 100 INJECTION, SOLUTION INTRAVENOUS; SUBCUTANEOUS at 17:58

## 2022-12-19 RX ADMIN — OSELTAMIVIR PHOSPHATE 30 MG: 6 POWDER, FOR SUSPENSION ORAL at 20:08

## 2022-12-19 RX ADMIN — INSULIN LISPRO 2 UNITS: 100 INJECTION, SOLUTION INTRAVENOUS; SUBCUTANEOUS at 07:57

## 2022-12-19 RX ADMIN — HYDROCODONE BITARTRATE AND ACETAMINOPHEN 1 TABLET: 5; 325 TABLET ORAL at 00:00

## 2022-12-19 RX ADMIN — DOXYCYCLINE 100 MG: 100 INJECTION, POWDER, LYOPHILIZED, FOR SOLUTION INTRAVENOUS at 14:36

## 2022-12-19 RX ADMIN — PIPERACILLIN AND TAZOBACTAM 3375 MG: 3; .375 INJECTION, POWDER, FOR SOLUTION INTRAVENOUS at 20:10

## 2022-12-19 RX ADMIN — HYDROCODONE BITARTRATE AND ACETAMINOPHEN 1 TABLET: 5; 325 TABLET ORAL at 20:07

## 2022-12-19 RX ADMIN — PIPERACILLIN AND TAZOBACTAM 3375 MG: 3; .375 INJECTION, POWDER, FOR SOLUTION INTRAVENOUS at 04:24

## 2022-12-19 RX ADMIN — INSULIN LISPRO 3 UNITS: 100 INJECTION, SOLUTION INTRAVENOUS; SUBCUTANEOUS at 07:57

## 2022-12-19 RX ADMIN — GUAIFENESIN 600 MG: 600 TABLET ORAL at 07:49

## 2022-12-19 RX ADMIN — INSULIN LISPRO 3 UNITS: 100 INJECTION, SOLUTION INTRAVENOUS; SUBCUTANEOUS at 11:37

## 2022-12-19 RX ADMIN — ENOXAPARIN SODIUM 40 MG: 100 INJECTION SUBCUTANEOUS at 07:50

## 2022-12-19 RX ADMIN — HYDROCODONE BITARTRATE AND ACETAMINOPHEN 1 TABLET: 5; 325 TABLET ORAL at 13:19

## 2022-12-19 ASSESSMENT — PAIN SCALES - GENERAL
PAINLEVEL_OUTOF10: 0
PAINLEVEL_OUTOF10: 8
PAINLEVEL_OUTOF10: 0
PAINLEVEL_OUTOF10: 3
PAINLEVEL_OUTOF10: 8
PAINLEVEL_OUTOF10: 7
PAINLEVEL_OUTOF10: 0
PAINLEVEL_OUTOF10: 0
PAINLEVEL_OUTOF10: 7

## 2022-12-19 ASSESSMENT — PAIN DESCRIPTION - LOCATION
LOCATION: GENERALIZED
LOCATION: BACK
LOCATION: BACK;SHOULDER
LOCATION: GENERALIZED

## 2022-12-19 ASSESSMENT — PAIN DESCRIPTION - ORIENTATION
ORIENTATION: OTHER (COMMENT)
ORIENTATION: LOWER
ORIENTATION: OTHER (COMMENT)

## 2022-12-19 ASSESSMENT — PAIN DESCRIPTION - PAIN TYPE
TYPE: CHRONIC PAIN
TYPE: ACUTE PAIN

## 2022-12-19 ASSESSMENT — PAIN DESCRIPTION - FREQUENCY
FREQUENCY: INTERMITTENT
FREQUENCY: INTERMITTENT

## 2022-12-19 ASSESSMENT — PAIN DESCRIPTION - DESCRIPTORS
DESCRIPTORS: DISCOMFORT
DESCRIPTORS: ACHING
DESCRIPTORS: DISCOMFORT
DESCRIPTORS: ACHING

## 2022-12-19 ASSESSMENT — PAIN - FUNCTIONAL ASSESSMENT
PAIN_FUNCTIONAL_ASSESSMENT: ACTIVITIES ARE NOT PREVENTED

## 2022-12-19 ASSESSMENT — PAIN SCALES - WONG BAKER: WONGBAKER_NUMERICALRESPONSE: 0

## 2022-12-19 ASSESSMENT — PAIN DESCRIPTION - ONSET
ONSET: GRADUAL
ONSET: AWAKENED FROM SLEEP

## 2022-12-19 NOTE — PROGRESS NOTES
[address] [facility] Pharmacokinetic Monitoring Service - Vancomycin    Consulting Provider: Dr Tiffanie Dickinson   Indication: Pneumonia (CAP)  Target Concentration: Goal AUC/TYRA 400-600 mg*hr/L  Day of Therapy: 3  Additional Antimicrobials: zosyn    Pertinent Laboratory Values: Wt Readings from Last 1 Encounters:   12/19/22 138 lb 4.8 oz (62.7 kg)     Temp Readings from Last 1 Encounters:   12/19/22 98.8 °F (37.1 °C) (Temporal)     Estimated Creatinine Clearance: 44 mL/min (A) (based on SCr of 1.9 mg/dL (H)). Recent Labs     12/18/22  0421 12/18/22  1039 12/18/22  2128 12/19/22  0408   CREATININE 2.1*   < > 1.9* 1.9*   WBC 22.4*  --   --  22.4*    < > = values in this interval not displayed.        Pertinent Cultures:  Culture Date Source Results   12/16/22 Respiratory panel Rhinovirus  Influenza A   12/17/22 Blood x2 MRSA   MRSA Nasal Swab: showed MRSA positive result on 12/18/22    Recent vancomycin administrations                     vancomycin (VANCOCIN) 1000 mg in sodium chloride 0.9% 250 mL IVPB (mg) 1,000 mg New Bag 12/18/22 1458    vancomycin (VANCOCIN) 1,250 mg in dextrose 5 % 250 mL IVPB (mg) 1,250 mg New Bag 12/17/22 1543                    Assessment:  Date/Time Current Dose Concentration Timing of Concentration (h) AUC   12/19/22 1355 pulse 8.7 22 h 23 min Unknown (insight calc 455)   Note: Serum concentrations collected for AUC dosing may appear elevated if collected in close proximity to the dose administered, this is not necessarily an indication of toxicity    Plan:  Current dosing regimen is therapeutic  Give 1000mg IV today   Repeat vancomycin concentration ordered for 12/20 @ 1430   Pharmacy will continue to monitor patient and adjust therapy as indicated    Thank you for the consult,  Comfort [facility]  12/19/2022 1:53 PM

## 2022-12-19 NOTE — PROGRESS NOTES
Hospitalist Progress Note  [facility]     Patient: Nury Gonzalezms  : 1980  MRN: [id number]  Code Status: Full Code    Hospital Day: 3   Date of Service: 2022    Subjective:   Patient seen and examined. No current complaints. Past Medical History:   Diagnosis Date    Chronic kidney disease     COVID-19     Hypertension     Type 1 diabetes ([address]        Past Surgical History:   Procedure Laterality Date    ADENOIDECTOMY         History reviewed. No pertinent family history.     Social History     Socioeconomic History    Marital status: Single     Spouse name: Not on file    Number of children: Not on file    Years of education: Not on file    Highest education level: Not on file   Occupational History    Not on file   Tobacco Use    Smoking status: Every Day     Packs/day: 0.50     Types: Cigarettes    Smokeless tobacco: Never   Vaping Use    Vaping Use: Every day   Substance and Sexual Activity    Alcohol use: Not Currently    Drug use: Not Currently    Sexual activity: Not on file   Other Topics Concern    Not on file   Social History Narrative    Not on file     Social Determinants of Health     Financial Resource Strain: Not on file   Food Insecurity: Not on file   Transportation Needs: Not on file   Physical Activity: Not on file   Stress: Not on file   Social Connections: Not on file   Intimate Partner Violence: Not on file   Housing Stability: Not on file       Current Facility-Administered Medications   Medication Dose Route Frequency Provider Last Rate Last Admin    oseltamivir 6mg/ml (TAMIFLU) suspension 30 mg  30 mg Oral BID Mary Fenton MD        perflutren lipid microspheres (DEFINITY) injection 1.5 mL  1.5 mL IntraVENous ONCE PRN Madina Patel MD   1.5 mL at 22 1043    guaiFENesin ([address]) extended release tablet 600 mg  600 mg Oral BID Madina Patel MD   600 mg at 22 0749    benzonatate (TESSALON) capsule 100 mg  100 mg Oral TID PRN Olaf De La Torre Catarino Pina MD   100 mg at 12/18/22 2014    enoxaparin (LOVENOX) injection 40 mg  40 mg SubCUTAneous Daily Maki Licea MD   40 mg at 12/19/22 0750    insulin glargine (LANTUS) injection vial 20 Units  20 Units SubCUTAneous Nightly Janay Delatorre MD   20 Units at 12/18/22 1651    insulin lispro (HUMALOG) injection vial 3 Units  3 Units SubCUTAneous TID DESIREE Delatorre MD   3 Units at 12/19/22 0757    insulin lispro (HUMALOG) injection vial 0-4 Units  0-4 Units SubCUTAneous TID DESIREE Delatorre MD   2 Units at 12/19/22 0757    insulin lispro (HUMALOG) injection vial 0-4 Units  0-4 Units SubCUTAneous Nightly Janay Delatorre MD        HYDROcodone-acetaminophen [facility]) 5-325 MG per tablet 1 tablet  1 tablet Oral Q6H PRN Janay Delatorre MD   1 tablet at 12/19/22 0749    potassium chloride (KLOR-CON M) extended release tablet 40 mEq  40 mEq Oral PRN Janay Delatorre MD        Or    potassium bicarb-citric acid (EFFER-K) effervescent tablet 40 mEq  40 mEq Oral PRN Janay Delatorre MD        Or    potassium chloride 10 mEq/100 mL IVPB (Peripheral Line)  10 mEq IntraVENous PRN Janay Delatorre MD        naloxegol (MOVANTIK) tablet 12.5 mg  12.5 mg Oral QAM AC Maki Licea MD   12.5 mg at 12/19/22 4507    lactated ringers infusion   IntraVENous Continuous Janay Delatorre MD 75 mL/hr at 12/18/22 2359 New Bag at 12/18/22 2359    vancomycin (VANCOCIN) intermittent dosing (placeholder)   Other RX Jacobo Gonzalez MD        ondansetron [facility]) injection 4 mg  4 mg IntraVENous Q6H PRN Maki Licea MD   4 mg at 12/17/22 2056    pantoprazole (PROTONIX) 40 mg in sodium chloride (PF) 0.9 % 10 mL injection  40 mg IntraVENous 2 times per day Maki Licea MD   40 mg at 12/19/22 0750    doxycycline (VIBRAMYCIN) 100 mg in sodium chloride 0.9 % 100 mL IVPB  100 mg IntraVENous Q12H Janay Delatorre MD   Stopped at 12/19/22 0312    piperacillin-tazobactam (ZOSYN) 3,375 mg in sodium chloride 0.9 % 50 mL IVPB ([id number])  3,375 mg IntraVENous Q8H Ricardo Aviles MD   Stopped at 12/19/22 7635    glucose chewable tablet 16 g  4 tablet Oral PRN Ivette Villavicencio MD        dextrose bolus 10% 125 mL  125 mL IntraVENous PRN Ivette Villavicencio MD        Or    dextrose bolus 10% 250 mL  250 mL IntraVENous PRN Ivette Villavicencio MD        glucagon (rDNA) injection 1 mg  1 mg SubCUTAneous PRN Ivette Villavicencio MD        dextrose 10 % infusion   IntraVENous Continuous PRN Ivette Villavicencio MD        dextrose bolus 10% 125 mL  125 mL IntraVENous PRN Tia Chatman MD        Or    dextrose bolus 10% 250 mL  250 mL IntraVENous PRN Tia Chatman MD        magnesium sulfate 1000 mg in dextrose 5% 100 mL IVPB  1,000 mg IntraVENous PRN Tia Chatman MD   Stopped at 12/18/22 2022    sodium phosphate 10 mmol in sodium chloride 0.9 % 250 mL IVPB  10 mmol IntraVENous PRN Tia Chatman MD        Or    sodium phosphate 15 mmol in dextrose 5 % 250 mL IVPB  15 mmol IntraVENous PRN Tia Chatman MD   Stopped at 12/18/22 2108    Or    sodium phosphate 20 mmol in dextrose 5 % 500 mL IVPB  20 mmol IntraVENous PRN Tia Chatman MD        polyethylene glycol (GLYCOLAX) packet 17 g  17 g Oral Daily PRN Tia Chatman MD        nicotine (NICODERM CQ) 7 MG/24HR 1 patch  1 patch TransDERmal Daily Tia Chatman MD   1 patch at 12/19/22 0750         lactated ringers 75 mL/hr at 12/18/22 2359    dextrose          Objective:   /80   Pulse (!) 107   Temp 98.7 °F (37.1 °C) (Temporal)   Resp 18   Ht 6' (1.829 m)   Wt 138 lb 4.8 oz (62.7 kg)   SpO2 95%   BMI 18.76 kg/m²     General: no acute distress  HEENT: normocephalic  Neck: supple, symmetrical, trachea midline   Lungs: improving rhonchi  Cardiovascular: s1 and s2 normal  Abdomen: soft, positive bowel sounds, nondistended, nontender  Extremities: no edema or cyanosis   Neuro: aaox3, no focal deficits    Recent Labs     12/17/22  0442 12/18/22  0421 12/19/22  0408   WBC 5.0 22.4* 22.4*   RBC 3.77* 2.99* 3.02*   HGB 11.2* 9.0* 9.2*   HCT 34.1* 26.4* 27.0*   MCV 90.5 88.3 89.4   MCH 29.7 30.1 30.5   MCHC 32.8* 34.1 34.1    298 289     Recent Labs     12/18/22  1516 12/18/22 2128 12/19/22  0408   * 129* 131*   K 3.6 4.6 4.5   ANIONGAP 9 12 13    100 101   CO2 13* 17* 17*   BUN 34* 38* 35*   CREATININE 1.4* 1.9* 1.9*   GLUCOSE 294* 269* 205*   CALCIUM 6.5* 9.3 9.2     Recent Labs     12/18/22  1039 12/18/22  1516 12/18/22 2128 12/19/22  0408   MG 1.8 1.3* 2.8* 2.4   PHOS 2.5 1.7* 3.1  --      Recent Labs     12/16/22  1915   AST 8   ALT 9   BILITOT 0.3   ALKPHOS 216*     No results for input(s): PH, PO2, PCO2, HCO3, BE, O2SAT in the last 72 hours. No results for input(s): TROPONINI in the last 72 hours. No results for input(s): INR in the last 72 hours. Recent Labs     12/17/22  0109   LACTA 1.7         Intake/Output Summary (Last 24 hours) at 12/19/2022 1114  Last data filed at 12/19/2022 0824  Gross per 24 hour   Intake 4501.31 ml   Output 4525 ml   Net -23.69 ml       XR ABDOMEN (KUB) (SINGLE AP VIEW)    Result Date: 12/19/2022  NO PRIOR REPORT AVAILABLE Exam:X-RAY OF THE ABDOMEN, KUB Clinical data:Abdominal pain. Technique: Single view of the abdomen is submitted. Prior studies: No prior studies submitted. Findings: There is a nonspecific nonobstructed bowel gas pattern without free intraperitoneal air. No abnormal calcifications or organomegaly are present. The soft tissues and bony structures are unremarkable. No signs of acute bowel obstruction seen. Recommendation: Follow up as clinically indicated.  Dictated and Electronically Signed by Iwona Crews MD at 19-Dec-2022 06:12:23 AM              Assessment and Plan:   DKA  MRSA bacteremia  Influenza A infection  Multifocal pneumonia possibly secondary to above  Rhinovirus infection  PETROS  Tobacco dependence     Admits to noncompliance with insulin counseled  DKA resolved  Insulin gtt since transitioned to sc insulin  Tolerating diet  Follow electrolytes/Accu-Cheks  HbA1c 12.3 poor control counseled  Vancomycin already on board  Follow repeat blood cultures  Echo ordered  ID consulted  Oseltamavir  Broad-spectrum antibiotics  Follow cultures  Unsure of baseline creatinine  Creatinine improved overall  Counseled on cessation of tobacco  Lovenox for DVT prophylaxis  Discussed treatment plan with patient/RN    Total critical care time: 48 minutes    Chelsey Frank MD   12/19/2022 11:14 AM

## 2022-12-19 NOTE — PROGRESS NOTES
Pharmacy Renal Adjustment    Adin Pedraza is a 38 y.o. male. Pharmacy has renally adjusted medications per protocol. Recent Labs     12/18/22 2128 12/19/22  0408   BUN 38* 35*       Recent Labs     12/18/22 2128 12/19/22  0408   CREATININE 1.9* 1.9*       Estimated Creatinine Clearance: 44 mL/min (A) (based on SCr of 1.9 mg/dL (H)). Height:   Ht Readings from Last 1 Encounters:   12/17/22 6' (1.829 m)     Weight:  Wt Readings from Last 1 Encounters:   12/19/22 138 lb 4.8 oz (62.7 kg)       Plan: Adjust the following medications based on renal function:  Increase tamiflu to 30mg by mouth twice daily for 5 more doses.     PARKER GRANT, PHARM D, 12/19/2022, 9:33 AM

## 2022-12-19 NOTE — PROGRESS NOTES
Physician Progress Note      Corey Avila  CSN #:                  [id number]  :                       1980  ADMIT DATE:       2022 7:06 PM  DISCH DATE:  RESPONDING  PROVIDER #:        Geneva Melendrez MD        QUERY TEXT:    Stage of Chronic Kidney Disease: Please provide further specificity, if known. Clinical indicators include: chronic kidney disease, bun, creatinine, bnp,   creatinine  creatinine  Options provided:  -- Chronic kidney disease stage 1  -- Chronic kidney disease stage 2  -- Chronic kidney disease stage 3  -- Chronic kidney disease stage 3a  -- Chronic kidney disease stage 3b  -- Chronic kidney disease stage 4  -- Chronic kidney disease stage 5  -- Chronic kidney disease stage 5, requiring dialysis  -- End stage renal disease  -- Other - I will add my own diagnosis  -- Disagree - Not applicable / Not valid  -- Disagree - Clinically Unable to determine / Unknown        PROVIDER RESPONSE TEXT:    The patient has chronic kidney disease stage 4. QUERY TEXT:    Pt admitted with DKA and PETROS. The Dietician has documented severe malnutrition   in the progress noted dated . Please further specify type of   malnutrition with documentation in the medical record. The medical record reflects the following:  Risk Factors: Acute and chronic illness. Clinical Indicators: BMI 17.3. \"Inadequate oral intake, Altered   nutrition-related lab values related to acute injury/trauma, endocrine   dysfunction as evidenced by NPO or clear liquid status due to medical   condition. \"  Treatment: Dietician consult. ASPEN Criteria:    https://aspenjournals. onlinelibrary. sarmiento. com/doi/full/10.1177/283595092516255  5  Options provided:  -- Severe Malnutrition  -- Severe Protein calorie malnutrition  -- Other - I will add my own diagnosis  -- Disagree - Not applicable / Not valid  -- Disagree - Clinically unable to determine / Unknown  -- Refer to Clinical Documentation Reviewer    PROVIDER RESPONSE TEXT:    This patient has severe protein calorie malnutrition.     Query created by: Ann-Marie Taylor on 12/19/2022 9:44 AM      Electronically signed by:  Aguilar Villalta MD 12/19/2022 5:03 PM

## 2022-12-19 NOTE — PROGRESS NOTES
Denise Bangura transferred to 316 from 150 via wheelchair. Reason for transfer: lower level of care   Explained reason for transfer to Patient. Belongings:  clothing, jewelry, cell phone, and    with patient at bedside . Soft chart transferred with patient: Yes. Telemetry box number N/A transferred with patient: N/A. Report given to: Anjum Navarro, via telephone.       Electronically signed by Shelbie De La Garza RN on 12/19/2022 at 2:55 PM

## 2022-12-20 ENCOUNTER — APPOINTMENT (OUTPATIENT)
Dept: MRI IMAGING | Age: 42
DRG: 637 | End: 2022-12-20
Payer: COMMERCIAL

## 2022-12-20 LAB
ANION GAP SERPL CALCULATED.3IONS-SCNC: 14 MMOL/L (ref 7–19)
BASOPHILS ABSOLUTE: 0 K/UL (ref 0–0.2)
BASOPHILS RELATIVE PERCENT: 0.2 % (ref 0–1)
BUN BLDV-MCNC: 35 MG/DL (ref 6–20)
CALCIUM SERPL-MCNC: 8.9 MG/DL (ref 8.6–10)
CHLORIDE BLD-SCNC: 100 MMOL/L (ref 98–111)
CO2: 19 MMOL/L (ref 22–29)
CREAT SERPL-MCNC: 2 MG/DL (ref 0.5–1.2)
EOSINOPHILS ABSOLUTE: 0 K/UL (ref 0–0.6)
EOSINOPHILS RELATIVE PERCENT: 0.1 % (ref 0–5)
GFR SERPL CREATININE-BSD FRML MDRD: 41 ML/MIN/{1.73_M2}
GLUCOSE BLD-MCNC: 236 MG/DL (ref 70–99)
GLUCOSE BLD-MCNC: 304 MG/DL (ref 70–99)
GLUCOSE BLD-MCNC: 318 MG/DL (ref 70–99)
GLUCOSE BLD-MCNC: 359 MG/DL (ref 70–99)
GLUCOSE BLD-MCNC: 364 MG/DL (ref 74–109)
HCT VFR BLD CALC: 27.1 % (ref 42–52)
HEMOGLOBIN: 8.9 G/DL (ref 14–18)
IMMATURE GRANULOCYTES #: 0.5 K/UL
LYMPHOCYTES ABSOLUTE: 1 K/UL (ref 1.1–4.5)
LYMPHOCYTES RELATIVE PERCENT: 6.1 % (ref 20–40)
MAGNESIUM: 1.8 MG/DL (ref 1.6–2.6)
MCH RBC QN AUTO: 29.6 PG (ref 27–31)
MCHC RBC AUTO-ENTMCNC: 32.8 G/DL (ref 33–37)
MCV RBC AUTO: 90 FL (ref 80–94)
MONOCYTES ABSOLUTE: 1.4 K/UL (ref 0–0.9)
MONOCYTES RELATIVE PERCENT: 8.4 % (ref 0–10)
NEUTROPHILS ABSOLUTE: 13.8 K/UL (ref 1.5–7.5)
NEUTROPHILS RELATIVE PERCENT: 82.5 % (ref 50–65)
PDW BLD-RTO: 13.1 % (ref 11.5–14.5)
PERFORMED ON: ABNORMAL
PLATELET # BLD: 314 K/UL (ref 130–400)
PMV BLD AUTO: 10.9 FL (ref 9.4–12.4)
POTASSIUM SERPL-SCNC: 4.4 MMOL/L (ref 3.5–5)
RBC # BLD: 3.01 M/UL (ref 4.7–6.1)
SODIUM BLD-SCNC: 133 MMOL/L (ref 136–145)
VANCOMYCIN RANDOM: 10.5 UG/ML
WBC # BLD: 16.7 K/UL (ref 4.8–10.8)

## 2022-12-20 PROCEDURE — A9577 INJ MULTIHANCE: HCPCS | Performed by: INTERNAL MEDICINE

## 2022-12-20 PROCEDURE — 6370000000 HC RX 637 (ALT 250 FOR IP): Performed by: HOSPITALIST

## 2022-12-20 PROCEDURE — 6360000002 HC RX W HCPCS: Performed by: INTERNAL MEDICINE

## 2022-12-20 PROCEDURE — 82947 ASSAY GLUCOSE BLOOD QUANT: CPT

## 2022-12-20 PROCEDURE — 2500000003 HC RX 250 WO HCPCS: Performed by: INTERNAL MEDICINE

## 2022-12-20 PROCEDURE — 83735 ASSAY OF MAGNESIUM: CPT

## 2022-12-20 PROCEDURE — 72156 MRI NECK SPINE W/O & W/DYE: CPT

## 2022-12-20 PROCEDURE — C9113 INJ PANTOPRAZOLE SODIUM, VIA: HCPCS | Performed by: HOSPITALIST

## 2022-12-20 PROCEDURE — 80202 ASSAY OF VANCOMYCIN: CPT

## 2022-12-20 PROCEDURE — 6360000002 HC RX W HCPCS: Performed by: HOSPITALIST

## 2022-12-20 PROCEDURE — 6360000004 HC RX CONTRAST MEDICATION: Performed by: INTERNAL MEDICINE

## 2022-12-20 PROCEDURE — 2580000003 HC RX 258: Performed by: INTERNAL MEDICINE

## 2022-12-20 PROCEDURE — 1210000000 HC MED SURG R&B

## 2022-12-20 PROCEDURE — 6370000000 HC RX 637 (ALT 250 FOR IP): Performed by: INTERNAL MEDICINE

## 2022-12-20 PROCEDURE — 72157 MRI CHEST SPINE W/O & W/DYE: CPT

## 2022-12-20 PROCEDURE — 80048 BASIC METABOLIC PNL TOTAL CA: CPT

## 2022-12-20 PROCEDURE — 36415 COLL VENOUS BLD VENIPUNCTURE: CPT

## 2022-12-20 PROCEDURE — 85025 COMPLETE CBC W/AUTO DIFF WBC: CPT

## 2022-12-20 PROCEDURE — 2580000003 HC RX 258: Performed by: HOSPITALIST

## 2022-12-20 RX ORDER — INSULIN GLARGINE 100 [IU]/ML
22 INJECTION, SOLUTION SUBCUTANEOUS NIGHTLY
Status: DISCONTINUED | OUTPATIENT
Start: 2022-12-20 | End: 2022-12-24

## 2022-12-20 RX ORDER — INSULIN LISPRO 100 [IU]/ML
4 INJECTION, SOLUTION INTRAVENOUS; SUBCUTANEOUS
Status: DISCONTINUED | OUTPATIENT
Start: 2022-12-20 | End: 2022-12-24

## 2022-12-20 RX ADMIN — INSULIN LISPRO 3 UNITS: 100 INJECTION, SOLUTION INTRAVENOUS; SUBCUTANEOUS at 08:34

## 2022-12-20 RX ADMIN — Medication 40 MG: at 08:34

## 2022-12-20 RX ADMIN — VANCOMYCIN HYDROCHLORIDE 1000 MG: 1 INJECTION, POWDER, LYOPHILIZED, FOR SOLUTION INTRAVENOUS at 19:58

## 2022-12-20 RX ADMIN — DOXYCYCLINE 100 MG: 100 INJECTION, POWDER, LYOPHILIZED, FOR SOLUTION INTRAVENOUS at 02:49

## 2022-12-20 RX ADMIN — GUAIFENESIN 600 MG: 600 TABLET ORAL at 08:34

## 2022-12-20 RX ADMIN — GUAIFENESIN 600 MG: 600 TABLET ORAL at 19:59

## 2022-12-20 RX ADMIN — PIPERACILLIN AND TAZOBACTAM 3375 MG: 3; .375 INJECTION, POWDER, FOR SOLUTION INTRAVENOUS at 21:16

## 2022-12-20 RX ADMIN — OSELTAMIVIR PHOSPHATE 30 MG: 6 POWDER, FOR SUSPENSION ORAL at 17:31

## 2022-12-20 RX ADMIN — Medication 40 MG: at 20:00

## 2022-12-20 RX ADMIN — INSULIN LISPRO 3 UNITS: 100 INJECTION, SOLUTION INTRAVENOUS; SUBCUTANEOUS at 12:07

## 2022-12-20 RX ADMIN — HYDROCODONE BITARTRATE AND ACETAMINOPHEN 1 TABLET: 5; 325 TABLET ORAL at 17:31

## 2022-12-20 RX ADMIN — GADOBENATE DIMEGLUMINE 13 ML: 529 INJECTION, SOLUTION INTRAVENOUS at 14:46

## 2022-12-20 RX ADMIN — INSULIN GLARGINE 22 UNITS: 100 INJECTION, SOLUTION SUBCUTANEOUS at 21:16

## 2022-12-20 RX ADMIN — INSULIN LISPRO 4 UNITS: 100 INJECTION, SOLUTION INTRAVENOUS; SUBCUTANEOUS at 17:32

## 2022-12-20 RX ADMIN — INSULIN LISPRO 3 UNITS: 100 INJECTION, SOLUTION INTRAVENOUS; SUBCUTANEOUS at 08:35

## 2022-12-20 RX ADMIN — INSULIN LISPRO 4 UNITS: 100 INJECTION, SOLUTION INTRAVENOUS; SUBCUTANEOUS at 21:17

## 2022-12-20 RX ADMIN — DOXYCYCLINE 100 MG: 100 INJECTION, POWDER, LYOPHILIZED, FOR SOLUTION INTRAVENOUS at 16:00

## 2022-12-20 RX ADMIN — ENOXAPARIN SODIUM 40 MG: 100 INJECTION SUBCUTANEOUS at 08:34

## 2022-12-20 RX ADMIN — INSULIN LISPRO 1 UNITS: 100 INJECTION, SOLUTION INTRAVENOUS; SUBCUTANEOUS at 17:32

## 2022-12-20 RX ADMIN — PIPERACILLIN AND TAZOBACTAM 3375 MG: 3; .375 INJECTION, POWDER, FOR SOLUTION INTRAVENOUS at 05:28

## 2022-12-20 RX ADMIN — NALOXEGOL OXALATE 12.5 MG: 12.5 TABLET, FILM COATED ORAL at 05:27

## 2022-12-20 RX ADMIN — HYDROCODONE BITARTRATE AND ACETAMINOPHEN 1 TABLET: 5; 325 TABLET ORAL at 05:31

## 2022-12-20 RX ADMIN — INSULIN LISPRO 4 UNITS: 100 INJECTION, SOLUTION INTRAVENOUS; SUBCUTANEOUS at 12:07

## 2022-12-20 RX ADMIN — OSELTAMIVIR PHOSPHATE 30 MG: 6 POWDER, FOR SUSPENSION ORAL at 05:27

## 2022-12-20 RX ADMIN — PIPERACILLIN AND TAZOBACTAM 3375 MG: 3; .375 INJECTION, POWDER, FOR SOLUTION INTRAVENOUS at 12:05

## 2022-12-20 ASSESSMENT — PAIN - FUNCTIONAL ASSESSMENT
PAIN_FUNCTIONAL_ASSESSMENT: ACTIVITIES ARE NOT PREVENTED
PAIN_FUNCTIONAL_ASSESSMENT: ACTIVITIES ARE NOT PREVENTED

## 2022-12-20 ASSESSMENT — PAIN DESCRIPTION - ORIENTATION: ORIENTATION: MID

## 2022-12-20 ASSESSMENT — PAIN DESCRIPTION - LOCATION
LOCATION: BACK;NECK
LOCATION: BACK

## 2022-12-20 ASSESSMENT — PAIN SCALES - GENERAL
PAINLEVEL_OUTOF10: 8
PAINLEVEL_OUTOF10: 7

## 2022-12-20 ASSESSMENT — PAIN DESCRIPTION - DESCRIPTORS
DESCRIPTORS: ACHING
DESCRIPTORS: ACHING

## 2022-12-20 NOTE — PROGRESS NOTES
Physical Therapy  Pt states he has been up ad kristi in room without difficulty and has taken a shower today.  Denies need for PT eval.  Electronically signed by Nataliya Paul PT on 12/20/2022 at 11:55 AM

## 2022-12-20 NOTE — PROGRESS NOTES
Hospitalist Progress Note  [facility]     Patient: Yvette Garcia  : 1980  MRN: [id number]  Code Status: Full Code    Hospital Day: 4   Date of Service: 2022    Subjective:   Patient seen and examined. No current complaints. Past Medical History:   Diagnosis Date    Chronic kidney disease     COVID-19     Hypertension     Type 1 diabetes ([address]        Past Surgical History:   Procedure Laterality Date    ADENOIDECTOMY         History reviewed. No pertinent family history.     Social History     Socioeconomic History    Marital status: Single     Spouse name: Not on file    Number of children: Not on file    Years of education: Not on file    Highest education level: Not on file   Occupational History    Not on file   Tobacco Use    Smoking status: Every Day     Packs/day: 0.50     Types: Cigarettes    Smokeless tobacco: Never   Vaping Use    Vaping Use: Every day   Substance and Sexual Activity    Alcohol use: Not Currently    Drug use: Not Currently    Sexual activity: Not on file   Other Topics Concern    Not on file   Social History Narrative    Not on file     Social Determinants of Health     Financial Resource Strain: Not on file   Food Insecurity: Not on file   Transportation Needs: Not on file   Physical Activity: Not on file   Stress: Not on file   Social Connections: Not on file   Intimate Partner Violence: Not on file   Housing Stability: Not on file       Current Facility-Administered Medications   Medication Dose Route Frequency Provider Last Rate Last Admin    oseltamivir 6mg/ml (TAMIFLU) suspension 30 mg  30 mg Oral BID Danis Donohue MD   30 mg at 22 0527    guaiFENesin [facility]) extended release tablet 600 mg  600 mg Oral BID Myrna Alatorre MD   600 mg at 22 3552    benzonatate (TESSALON) capsule 100 mg  100 mg Oral TID PRN Myrna Alatorre MD   100 mg at 22 1520    enoxaparin (LOVENOX) injection 40 mg  40 mg SubCUTAneous Daily Paty Cota Maggie Jewell MD   40 mg at 12/20/22 0834    insulin glargine (LANTUS) injection vial 20 Units  20 Units SubCUTAneous Nightly Mami Jones MD   20 Units at 12/19/22 2011    insulin lispro (HUMALOG) injection vial 3 Units  3 Units SubCUTAneous TID DESIREE Jones MD   3 Units at 12/20/22 0835    insulin lispro (HUMALOG) injection vial 0-4 Units  0-4 Units SubCUTAneous TID DESIREE Jones MD   3 Units at 12/20/22 1746    insulin lispro (HUMALOG) injection vial 0-4 Units  0-4 Units SubCUTAneous Nightly Mami Jones MD        HYDROcodone-acetaminophen [facility]) 5-325 MG per tablet 1 tablet  1 tablet Oral Q6H PRN Mami Jones MD   1 tablet at 12/20/22 0531    potassium chloride (KLOR-CON M) extended release tablet 40 mEq  40 mEq Oral PRN Mami Jones MD        Or    potassium bicarb-citric acid (EFFER-K) effervescent tablet 40 mEq  40 mEq Oral PRN Mami Jones MD        Or    potassium chloride 10 mEq/100 mL IVPB (Peripheral Line)  10 mEq IntraVENous PRN Mami Jones MD        naloxegol (MOVANTIK) tablet 12.5 mg  12.5 mg Oral QAM AC Aleksey Leary MD   12.5 mg at 12/20/22 6863    lactated ringers infusion   IntraVENous Continuous Mami Jones MD 75 mL/hr at 12/18/22 2359 New Bag at 12/18/22 2359    vancomycin (VANCOCIN) intermittent dosing (placeholder)   Other RX Laurie Nair MD        ondansetron [facility] injection 4 mg  4 mg IntraVENous Q6H PRN Aleksey Leary MD   4 mg at 12/17/22 2056    pantoprazole (PROTONIX) 40 mg in sodium chloride (PF) 0.9 % 10 mL injection  40 mg IntraVENous 2 times per day Aleksey Leary MD   40 mg at 12/20/22 0834    doxycycline (VIBRAMYCIN) 100 mg in sodium chloride 0.9 % 100 mL IVPB  100 mg IntraVENous Q12H Mami Jones MD   Stopped at 12/20/22 0353    piperacillin-tazobactam (ZOSYN) 3,375 mg in sodium chloride 0.9 % 50 mL IVPB ([id number])  3,375 mg IntraVENous Q8H Mami Jones MD   Stopped at 12/20/22 5242    glucose chewable tablet 16 g  4 tablet Oral PRN Nathan Soto MD        dextrose bolus 10% 125 mL  125 mL IntraVENous PRN Nathan Soto MD        Or    dextrose bolus 10% 250 mL  250 mL IntraVENous PRN Nathan Soto MD        glucagon (rDNA) injection 1 mg  1 mg SubCUTAneous PRN Nathan Soto MD        dextrose 10 % infusion   IntraVENous Continuous PRN Nathan Soto MD        dextrose bolus 10% 125 mL  125 mL IntraVENous PRN Robin Cervantes MD        Or    dextrose bolus 10% 250 mL  250 mL IntraVENous PRN Robin Cervantes MD        magnesium sulfate 1000 mg in dextrose 5% 100 mL IVPB  1,000 mg IntraVENous PRN Robin Cervantes MD   Stopped at 12/18/22 2022    sodium phosphate 10 mmol in sodium chloride 0.9 % 250 mL IVPB  10 mmol IntraVENous PRN Robin Cervantes MD        Or    sodium phosphate 15 mmol in dextrose 5 % 250 mL IVPB  15 mmol IntraVENous PRN Robin Cervantes MD   Stopped at 12/18/22 2108    Or    sodium phosphate 20 mmol in dextrose 5 % 500 mL IVPB  20 mmol IntraVENous PRN Robin Cervantes MD        polyethylene glycol (GLYCOLAX) packet 17 g  17 g Oral Daily PRN Robin Cervantes MD        nicotine (NICODERM CQ) 7 MG/24HR 1 patch  1 patch TransDERmal Daily Robin Cervantes MD   1 patch at 12/20/22 0834         lactated ringers 75 mL/hr at 12/18/22 2359    dextrose          Objective:   BP (!) 128/96   Pulse 100   Temp 97.4 °F (36.3 °C) (Oral)   Resp 18   Ht 6' (1.829 m)   Wt 137 lb 1.6 oz (62.2 kg)   SpO2 97%   BMI 18.59 kg/m²     General: no acute distress  HEENT: normocephalic  Neck: supple, symmetrical, trachea midline   Lungs: improving rhonchi  Cardiovascular: s1 and s2 normal  Abdomen: soft, positive bowel sounds, nondistended, nontender  Extremities: no edema or cyanosis   Neuro: aaox3, no focal deficits    Recent Labs     12/18/22  0421 12/19/22  0408 12/20/22  0250   WBC 22.4* 22.4* 16.7*   RBC 2.99* 3.02* 3.01*   HGB 9.0* 9.2* 8.9*   HCT 26.4* 27.0* 27.1*   MCV 88.3 89.4 90.0   MCH 30.1 30.5 29.6   MCHC 34.1 34.1 32.8*    289 314     Recent Labs     12/18/22 2128 12/19/22  0408 12/20/22  0250   * 131* 133*   K 4.6 4.5 4.4   ANIONGAP 12 13 14    101 100   CO2 17* 17* 19*   BUN 38* 35* 35*   CREATININE 1.9* 1.9* 2.0*   GLUCOSE 269* 205* 364*   CALCIUM 9.3 9.2 8.9     Recent Labs     12/18/22  1039 12/18/22  1516 12/18/22 2128 12/19/22  0408 12/20/22  0250   MG 1.8 1.3* 2.8* 2.4 1.8   PHOS 2.5 1.7* 3.1  --   --      No results for input(s): AST, ALT, ALB, BILITOT, ALKPHOS, ALB in the last 72 hours. No results for input(s): PH, PO2, PCO2, HCO3, BE, O2SAT in the last 72 hours. No results for input(s): TROPONINI in the last 72 hours. No results for input(s): INR in the last 72 hours. No results for input(s): LACTA in the last 72 hours. Intake/Output Summary (Last 24 hours) at 12/20/2022 1100  Last data filed at 12/19/2022 2226  Gross per 24 hour   Intake 1090.05 ml   Output 300 ml   Net 790.05 ml       No results found.      Assessment and Plan:   DKA  MRSA bacteremia  Influenza A infection  Multifocal pneumonia possibly secondary to above  Rhinovirus infection  PETROS  Tobacco dependence     Admits to noncompliance with insulin counseled  DKA resolved  Insulin gtt since transitioned to sc insulin  Tolerating diet  Follow electrolytes/Accu-Cheks  HbA1c 12.3 poor control counseled  Vancomycin already on board  Follow repeat blood cultures  TTE 12/19/2022 without mention of endocarditis  ID following  Oseltamavir  Broad-spectrum antibiotics  Follow cultures  Unsure of baseline creatinine  Creatinine improved overall  Counseled on cessation of tobacco  Lovenox for DVT prophylaxis  Discussed treatment plan with patient/RN    Roger oMnk MD   12/20/2022 11:00 AM

## 2022-12-20 NOTE — CONSULTS
INFECTIOUS DISEASES CONSULT NOTE    Patient:  Francisco Parada 38 y.o. male  ROOM # [unfilled]  YOB: 1980  MRN: [id number]  CSN:  [id number]  Admit date: 12/16/2022   Admitting Physician: Alec Good MD  Primary Care Physician: MARÍA Bella NP  REFERRING PROVIDER: No ref. provider found    Reason for Consultation: MRSA bacteremia    History of Present Illness/Chief Complaint: 49-year-old man. History of insulin-dependent diabetes mellitus. He was admitted to the hospital on December 16. He indicates he had had poor oral intake, fatigue, and developed some vomiting and weakness. Per review of admit H&P and also per discussion with the patient he had been experiencing some upper back and neck pain for 1 to 2 weeks as well. He does not describe upper or lower extremity weakness or numbness. He indicates he has some decreased sensation of both feet consistent with diabetic neuropathy. During this admission he has had blood cultures grow methicillin-resistant Staph aureus. He does not report any recent boils or cellulitis. He had an extended hospitalization earlier this year lasting up to 5 months had number of different institutions for COVID-19 infections and complications with respiratory status in conjunction with COVID-19 infection. Infectious disease asked to evaluate and offer recommendations.     Current Scheduled Medications:    oseltamivir 6mg/ml  30 mg Oral BID    guaiFENesin  600 mg Oral BID    enoxaparin  40 mg SubCUTAneous Daily    insulin glargine  20 Units SubCUTAneous Nightly    insulin lispro  3 Units SubCUTAneous TID WC    insulin lispro  0-4 Units SubCUTAneous TID WC    insulin lispro  0-4 Units SubCUTAneous Nightly    naloxegol  12.5 mg Oral QAM AC    vancomycin (VANCOCIN) intermittent dosing (placeholder)   Other RX Placeholder    pantoprazole (PROTONIX) 40 mg injection  40 mg IntraVENous 2 times per day    doxycycline (VIBRAMYCIN) IV  100 mg IntraVENous Q12H piperacillin-tazobactam  3,375 mg IntraVENous Q8H    nicotine  1 patch TransDERmal Daily     Current PRN Medications:  perflutren lipid microspheres, benzonatate, HYDROcodone 5 mg - acetaminophen, potassium chloride **OR** potassium alternative oral replacement **OR** potassium chloride, ondansetron, glucose, dextrose bolus **OR** dextrose bolus, glucagon (rDNA), dextrose, dextrose bolus **OR** dextrose bolus, magnesium sulfate, sodium phosphate IVPB **OR** sodium phosphate IVPB **OR** sodium phosphate IVPB, polyethylene glycol    Allergies:  No Known Allergies    Past Medical History: Diabetes mellitus. Chronic kidney disease. History severe COVID-19 infection. Hypertension. Past Surgical History: Tracheostomy. Adenoidectomy. Social History: Tobacco use. No alcohol use. No illicit drug use. No injection drug use. He indicates he lives with his sister. Family History: Hypertension    Exposure History: No close contacts have been ill    Review of Systems: He reports no chest pain or chest pressure. No productive cough or sputum production. No headache or visual changes  No symptoms to suggest TIA  No urinary hesitancy  No dysuria  No suprapubic or flank pain  He does not report any localized joint pain or swelling  See HPI for pertinent positives negatives from his complete review of systems    Vital Signs:  /87   Pulse (!) 104   Temp 97.9 °F (36.6 °C) (Oral)   Resp 18   Ht 6' (1.829 m)   Wt 138 lb 4.8 oz (62.7 kg)   SpO2 99%   BMI 18.76 kg/m²  Temp (24hrs), Av.6 °F (37 °C), Min:97.5 °F (36.4 °C), Max:99.3 °F (37.4 °C)    Physical Exam:   Vital signs reviewed.   Sclera nonicteric  No conjunctival hemorrhages  No cervical axillary or inguinal adenopathy  Lungs clear to auscultation without crackles or wheezes  Heart distant heart sounds without apparent systolic or diastolic murmur  Abdomen is soft and nontender without hepatosplenomegaly or mass  Extremities without edema  No splinter hemorrhages or Janeway lesions  Musculoskeletal exam demonstrates no joint swelling, erythema, warmth, or effusion  Skin without rash  Cranial nerves II through XII intact  Motor strength 5 out of 5 in the upper lower extremities  Sensory exam intact to touch in the upper lower extremities    Lab Results:  CBC:   Recent Labs     12/16/22 1915 12/17/22  0442 12/18/22  0421 12/19/22  0408   WBC 16.1* 5.0 22.4* 22.4*   HGB 13.2* 11.2* 9.0* 9.2*   HCT 42.8 34.1* 26.4* 27.0*    331 298 289   LYMPHOPCT 3.4*  --   --  3.0*   MONOPCT 10.8*  --   --  5.0     CMP:   Recent Labs     12/16/22 1915 12/16/22 2206 12/18/22  1516 12/18/22  2128 12/19/22  0408   *   < > 133* 129* 131*   K 5.7*   < > 3.6 4.6 4.5   CL 80*   < > 111 100 101   CO2 5*   < > 13* 17* 17*   BUN 91*   < > 34* 38* 35*   CREATININE 3.8*   < > 1.4* 1.9* 1.9*   CALCIUM 10.1*   < > 6.5* 9.3 9.2   BILITOT 0.3  --   --   --   --    ALKPHOS 216*  --   --   --   --    ALT 9  --   --   --   --    AST 8  --   --   --   --    GLUCOSE 745*   < > 294* 269* 205*    < > = values in this interval not displayed. Culture:   December 17, 2022-blood cultures December 17, 2022-no growth  Blood cultures December 17, 2022-MRSA  Blood cultures drawn today-pending  Blood cultures drawn today-pending    Respiratory pathogen PCR panel from December 16, 2022-positive for human rhinovirus and also positive for influenza A    Radiology:   KUB done December 18, 2022:    Impression   No signs of acute bowel obstruction seen. Recommendation:    Follow up as clinically indicated. Dictated and Electronically Signed by Ekat Richards MD at 19-Dec-2022 88:50:77 AM           Chest x-ray done December 16, 2022:    Impression   Multifocal pneumonia and/or volume overload with small right-sided pleural   effusion. Additional Studies Reviewed:     Impression:   1. MRSA bacteremia-definite source uncertain.   Possible transient bacteremia from pulmonary source complicating influenza infection. Unknown cutaneous source also possible. 2.  Molecular virus PCR panel demonstrating rhinovirus and influenza A  3. Diabetic ketoacidosis  4. Diabetes mellitus  5. Upper back and neck pain  6.   Acute on chronic renal failure    Recommendations:    Continue treatment with vancomycin  Pharmacy assistance with dosing  Complete 5-day course of oseltamavir  Await follow-up blood cultures  Watch for signs, symptoms, and exam findings of metastatic focus of staph infection  Given the upper back and neck pain for the past 2 weeks in the setting of staff aureus bloodstream infection would recommend MRI of thoracic and cervical spine without and with gadolinium to make sure no discitis/epidural abscess/paraspinal infection  Continue to follow        Segundo Kemp MD  12/19/22  6:50 PM

## 2022-12-20 NOTE — PROGRESS NOTES
Comprehensive Nutrition Assessment    Type and Reason for Visit:  Reassess    Nutrition Recommendations/Plan:   Will continue diet order and start Glucerna ONS TID to help with oral intake. Malnutrition Assessment:  Malnutrition Status:  Severe malnutrition (12/20/22 1106)    Context:  Chronic Illness     Findings of the 6 clinical characteristics of malnutrition:  Energy Intake:  Mild decrease in energy intake (Comment)  Weight Loss:  Greater than 7.5% over 3 months     Body Fat Loss:  Severe body fat loss Orbital, Buccal region   Muscle Mass Loss:  Mild muscle mass loss Temples (temporalis), Clavicles (pectoralis & deltoids)  Fluid Accumulation:  Mild Extremities   Strength:  Not Performed    Nutrition Assessment:    Seen for follow-up assessment. Pt. reflecting a % PO intake after diet upgrade. BG elevated 318. BUN elevated at 35. A1C elevated at 12.3. Pt. has a severe malnutrition diagnosis. Will continue diet order and start Glucerna ONS TID to help with oral intake. Nutrition Related Findings:    A1C 12.3 Wound Type: None       Current Nutrition Intake & Therapies:    Average Meal Intake: %  Average Supplements Intake: Unable to assess  ADULT DIET; Regular; 4 carb choices (60 gm/meal); Safety Tray; Safety Tray (Disposables)  ADULT ORAL NUTRITION SUPPLEMENT; Breakfast, Lunch, Dinner; Diabetic Oral Supplement    Anthropometric Measures:  Height: 6' (182.9 cm)  Ideal Body Weight (IBW): 178 lbs (81 kg)    Admission Body Weight: 140 lb (63.5 kg)  Current Body Weight: 137 lb (62.1 kg), 71.7 % IBW. Weight Source: Standing Scale  Current BMI (kg/m2): 18.6  Usual Body Weight: 150 lb (68 kg)  % Weight Change (Calculated): -14.9  Weight Adjustment For: No Adjustment                 BMI Categories: Normal Weight (BMI 18.5-24. 9)    Estimated Daily Nutrient Needs:  Energy Requirements Based On: Kcal/kg  Weight Used for Energy Requirements: Current  Energy (kcal/day): 2806-2302  Weight Used for Protein Requirements: Current  Protein (g/day): 106  Method Used for Fluid Requirements: 1 ml/kcal  Fluid (ml/day): 9797-8502    Nutrition Diagnosis:   Inadequate oral intake, Altered nutrition-related lab values related to acute injury/trauma, endocrine dysfuntion as evidenced by NPO or clear liquid status due to medical condition, lab values    Nutrition Interventions:   Food and/or Nutrient Delivery: Continue Current Diet, Start Oral Nutrition Supplement  Nutrition Education/Counseling: No recommendation at this time  Coordination of Nutrition Care: No recommendation at this time       Goals:     Goals: Meet at least 75% of estimated needs, PO intake 75% or greater       Nutrition Monitoring and Evaluation:   Behavioral-Environmental Outcomes: None Identified  Food/Nutrient Intake Outcomes: Diet Advancement/Tolerance, Food and Nutrient Intake, Supplement Intake  Physical Signs/Symptoms Outcomes: Biochemical Data, Fluid Status or Edema, Skin, Weight, Nausea or Vomiting    Discharge Planning:     Too soon to determine     Candi Simpson 85, RD, LD  Contact: [phone]

## 2022-12-20 NOTE — CARE COORDINATION
Patient Contact Information:    [address]  [phone] (home)   Telephone Information:   Mobile [phone]     Above information verified? [x]   Yes  []   No      Emergency Contacts:    Extended Emergency Contact Information  Primary Emergency Contact: Aura Mcqueen Phone: [phone]  Relation: Brother/Sister   needed? No      Have you been vaccinated for COVID-19 (SARS-CoV-2)? []   Yes  [x]   No                   If so, when? Which :         []   Pfizer-[employer]  []   Moderna  []   [employer]  []   Other:         Pharmacy:    [address]  [address], [address] [phone]  [address] [place] [address]  Phone: [phone] Fax: [phone]          Patient Deficits:    []   Yes   [x]   No    If yes:    []   Confusion/Memory  []   Visual  []   Motor/Sensory         []   Right arm         []   Right leg         []   Left arm         []   Left leg  []   Language/Speech         []   Aphasia         []   Dysarthria         []   Swallow         [place] Coma Scale  Eye Opening: Spontaneous  Best Verbal Response: Oriented  Best Motor Response: Obeys commands  [place]berto Coma Scale Score: 15         SW visited with Pt re: d/c planning; Pt stated he lives with his sister and niece; plans to return; he stated to be IND with ADL and IADL needs; agreed to [facility] program referral re: his DM;  Pt noted to be currently working toward getting disability; pt voiced no needs at current;  Electronically signed by Rubia Richard [facility] on 12/21/2022 at 6:11 PM

## 2022-12-20 NOTE — PROGRESS NOTES
Occupational Therapy      Pt. States that he has been up independently to the bathroom and gave himself a shower. Feels he will do fine with self care and transfers and does not need therapy. Will d/c orders at pt.  Request.  Electronically signed by Laz Robertson OT on 12/20/2022 at 1:06 PM

## 2022-12-21 LAB
ANION GAP SERPL CALCULATED.3IONS-SCNC: 13 MMOL/L (ref 7–19)
ATYPICAL LYMPHOCYTE RELATIVE PERCENT: 1 % (ref 0–8)
BASOPHILS ABSOLUTE: 0 K/UL (ref 0–0.2)
BASOPHILS RELATIVE PERCENT: 0 % (ref 0–1)
BUN BLDV-MCNC: 26 MG/DL (ref 6–20)
CALCIUM SERPL-MCNC: 8.9 MG/DL (ref 8.6–10)
CHLORIDE BLD-SCNC: 98 MMOL/L (ref 98–111)
CO2: 20 MMOL/L (ref 22–29)
CREAT SERPL-MCNC: 2 MG/DL (ref 0.5–1.2)
EOSINOPHILS ABSOLUTE: 0.14 K/UL (ref 0–0.6)
EOSINOPHILS RELATIVE PERCENT: 1 % (ref 0–5)
GFR SERPL CREATININE-BSD FRML MDRD: 41 ML/MIN/{1.73_M2}
GLUCOSE BLD-MCNC: 188 MG/DL (ref 70–99)
GLUCOSE BLD-MCNC: 236 MG/DL (ref 74–109)
GLUCOSE BLD-MCNC: 299 MG/DL (ref 70–99)
GLUCOSE BLD-MCNC: 359 MG/DL (ref 70–99)
GLUCOSE BLD-MCNC: 383 MG/DL (ref 70–99)
HCT VFR BLD CALC: 29.2 % (ref 42–52)
HEMOGLOBIN: 9.7 G/DL (ref 14–18)
IMMATURE GRANULOCYTES #: 0.9 K/UL
LYMPHOCYTES ABSOLUTE: 1.7 K/UL (ref 1.1–4.5)
LYMPHOCYTES RELATIVE PERCENT: 11 % (ref 20–40)
MAGNESIUM: 1.5 MG/DL (ref 1.6–2.6)
MCH RBC QN AUTO: 29.7 PG (ref 27–31)
MCHC RBC AUTO-ENTMCNC: 33.2 G/DL (ref 33–37)
MCV RBC AUTO: 89.3 FL (ref 80–94)
MONOCYTES ABSOLUTE: 1.8 K/UL (ref 0–0.9)
MONOCYTES RELATIVE PERCENT: 13 % (ref 0–10)
MONONUCLEAR UNIDENTIFIED CELLS: 1 %
NEUTROPHILS ABSOLUTE: 10.4 K/UL (ref 1.5–7.5)
NEUTROPHILS RELATIVE PERCENT: 73 % (ref 50–65)
PDW BLD-RTO: 13 % (ref 11.5–14.5)
PERFORMED ON: ABNORMAL
PLATELET # BLD: 354 K/UL (ref 130–400)
PLATELET SLIDE REVIEW: ADEQUATE
PMV BLD AUTO: 10.3 FL (ref 9.4–12.4)
POTASSIUM SERPL-SCNC: 3.9 MMOL/L (ref 3.5–5)
RBC # BLD: 3.27 M/UL (ref 4.7–6.1)
SODIUM BLD-SCNC: 131 MMOL/L (ref 136–145)
TOXIC GRANULATION: ABNORMAL
WBC # BLD: 14.2 K/UL (ref 4.8–10.8)

## 2022-12-21 PROCEDURE — 82947 ASSAY GLUCOSE BLOOD QUANT: CPT

## 2022-12-21 PROCEDURE — 2580000003 HC RX 258: Performed by: HOSPITALIST

## 2022-12-21 PROCEDURE — 6370000000 HC RX 637 (ALT 250 FOR IP): Performed by: HOSPITALIST

## 2022-12-21 PROCEDURE — 2500000003 HC RX 250 WO HCPCS: Performed by: INTERNAL MEDICINE

## 2022-12-21 PROCEDURE — 94760 N-INVAS EAR/PLS OXIMETRY 1: CPT

## 2022-12-21 PROCEDURE — 2580000003 HC RX 258: Performed by: INTERNAL MEDICINE

## 2022-12-21 PROCEDURE — 87040 BLOOD CULTURE FOR BACTERIA: CPT

## 2022-12-21 PROCEDURE — 02HV33Z INSERTION OF INFUSION DEVICE INTO SUPERIOR VENA CAVA, PERCUTANEOUS APPROACH: ICD-10-PCS | Performed by: INTERNAL MEDICINE

## 2022-12-21 PROCEDURE — 80048 BASIC METABOLIC PNL TOTAL CA: CPT

## 2022-12-21 PROCEDURE — 6360000002 HC RX W HCPCS: Performed by: INTERNAL MEDICINE

## 2022-12-21 PROCEDURE — 36415 COLL VENOUS BLD VENIPUNCTURE: CPT

## 2022-12-21 PROCEDURE — 83735 ASSAY OF MAGNESIUM: CPT

## 2022-12-21 PROCEDURE — 6360000002 HC RX W HCPCS: Performed by: HOSPITALIST

## 2022-12-21 PROCEDURE — 6370000000 HC RX 637 (ALT 250 FOR IP): Performed by: INTERNAL MEDICINE

## 2022-12-21 PROCEDURE — 1210000000 HC MED SURG R&B

## 2022-12-21 PROCEDURE — 85025 COMPLETE CBC W/AUTO DIFF WBC: CPT

## 2022-12-21 PROCEDURE — C9113 INJ PANTOPRAZOLE SODIUM, VIA: HCPCS | Performed by: HOSPITALIST

## 2022-12-21 RX ORDER — PANTOPRAZOLE SODIUM 40 MG/1
40 TABLET, DELAYED RELEASE ORAL
Status: DISCONTINUED | OUTPATIENT
Start: 2022-12-21 | End: 2022-12-26 | Stop reason: HOSPADM

## 2022-12-21 RX ORDER — MAGNESIUM SULFATE 1 G/100ML
1000 INJECTION INTRAVENOUS ONCE
Status: COMPLETED | OUTPATIENT
Start: 2022-12-21 | End: 2022-12-21

## 2022-12-21 RX ADMIN — DOXYCYCLINE 100 MG: 100 INJECTION, POWDER, LYOPHILIZED, FOR SOLUTION INTRAVENOUS at 03:24

## 2022-12-21 RX ADMIN — SODIUM CHLORIDE, POTASSIUM CHLORIDE, SODIUM LACTATE AND CALCIUM CHLORIDE: 600; 310; 30; 20 INJECTION, SOLUTION INTRAVENOUS at 23:15

## 2022-12-21 RX ADMIN — OSELTAMIVIR PHOSPHATE 30 MG: 6 POWDER, FOR SUSPENSION ORAL at 06:51

## 2022-12-21 RX ADMIN — GUAIFENESIN 600 MG: 600 TABLET ORAL at 20:03

## 2022-12-21 RX ADMIN — INSULIN LISPRO 4 UNITS: 100 INJECTION, SOLUTION INTRAVENOUS; SUBCUTANEOUS at 17:06

## 2022-12-21 RX ADMIN — INSULIN LISPRO 4 UNITS: 100 INJECTION, SOLUTION INTRAVENOUS; SUBCUTANEOUS at 11:55

## 2022-12-21 RX ADMIN — NALOXEGOL OXALATE 12.5 MG: 12.5 TABLET, FILM COATED ORAL at 06:51

## 2022-12-21 RX ADMIN — HYDROCODONE BITARTRATE AND ACETAMINOPHEN 1 TABLET: 5; 325 TABLET ORAL at 20:03

## 2022-12-21 RX ADMIN — PIPERACILLIN AND TAZOBACTAM 3375 MG: 3; .375 INJECTION, POWDER, FOR SOLUTION INTRAVENOUS at 06:55

## 2022-12-21 RX ADMIN — PANTOPRAZOLE SODIUM 40 MG: 40 TABLET, DELAYED RELEASE ORAL at 17:07

## 2022-12-21 RX ADMIN — INSULIN GLARGINE 22 UNITS: 100 INJECTION, SOLUTION SUBCUTANEOUS at 20:53

## 2022-12-21 RX ADMIN — VANCOMYCIN HYDROCHLORIDE 1000 MG: 1 INJECTION, POWDER, LYOPHILIZED, FOR SOLUTION INTRAVENOUS at 17:54

## 2022-12-21 RX ADMIN — BENZONATATE 100 MG: 100 CAPSULE ORAL at 17:16

## 2022-12-21 RX ADMIN — DAPTOMYCIN 500 MG: 500 INJECTION, POWDER, LYOPHILIZED, FOR SOLUTION INTRAVENOUS at 23:16

## 2022-12-21 RX ADMIN — MAGNESIUM SULFATE HEPTAHYDRATE 1000 MG: 1 INJECTION, SOLUTION INTRAVENOUS at 10:27

## 2022-12-21 RX ADMIN — GUAIFENESIN 600 MG: 600 TABLET ORAL at 08:39

## 2022-12-21 RX ADMIN — HYDROCODONE BITARTRATE AND ACETAMINOPHEN 1 TABLET: 5; 325 TABLET ORAL at 13:30

## 2022-12-21 RX ADMIN — INSULIN LISPRO 2 UNITS: 100 INJECTION, SOLUTION INTRAVENOUS; SUBCUTANEOUS at 17:06

## 2022-12-21 RX ADMIN — PIPERACILLIN AND TAZOBACTAM 3375 MG: 3; .375 INJECTION, POWDER, FOR SOLUTION INTRAVENOUS at 20:05

## 2022-12-21 RX ADMIN — ENOXAPARIN SODIUM 40 MG: 100 INJECTION SUBCUTANEOUS at 08:39

## 2022-12-21 RX ADMIN — INSULIN LISPRO 4 UNITS: 100 INJECTION, SOLUTION INTRAVENOUS; SUBCUTANEOUS at 20:53

## 2022-12-21 RX ADMIN — HYDROCODONE BITARTRATE AND ACETAMINOPHEN 1 TABLET: 5; 325 TABLET ORAL at 06:59

## 2022-12-21 RX ADMIN — BENZONATATE 100 MG: 100 CAPSULE ORAL at 23:19

## 2022-12-21 RX ADMIN — INSULIN LISPRO 4 UNITS: 100 INJECTION, SOLUTION INTRAVENOUS; SUBCUTANEOUS at 08:40

## 2022-12-21 RX ADMIN — SODIUM CHLORIDE, POTASSIUM CHLORIDE, SODIUM LACTATE AND CALCIUM CHLORIDE: 600; 310; 30; 20 INJECTION, SOLUTION INTRAVENOUS at 08:40

## 2022-12-21 RX ADMIN — Medication 40 MG: at 08:39

## 2022-12-21 RX ADMIN — INSULIN LISPRO 4 UNITS: 100 INJECTION, SOLUTION INTRAVENOUS; SUBCUTANEOUS at 11:54

## 2022-12-21 RX ADMIN — OSELTAMIVIR PHOSPHATE 30 MG: 6 POWDER, FOR SUSPENSION ORAL at 17:07

## 2022-12-21 RX ADMIN — PIPERACILLIN AND TAZOBACTAM 3375 MG: 3; .375 INJECTION, POWDER, FOR SOLUTION INTRAVENOUS at 13:31

## 2022-12-21 RX ADMIN — DOXYCYCLINE 100 MG: 100 INJECTION, POWDER, LYOPHILIZED, FOR SOLUTION INTRAVENOUS at 17:07

## 2022-12-21 ASSESSMENT — PAIN SCALES - GENERAL
PAINLEVEL_OUTOF10: 7
PAINLEVEL_OUTOF10: 6
PAINLEVEL_OUTOF10: 7

## 2022-12-21 ASSESSMENT — PAIN DESCRIPTION - DESCRIPTORS
DESCRIPTORS: ACHING

## 2022-12-21 ASSESSMENT — PAIN DESCRIPTION - LOCATION
LOCATION: HEAD;NECK
LOCATION: BACK;NECK
LOCATION: BACK

## 2022-12-21 ASSESSMENT — PAIN DESCRIPTION - ORIENTATION: ORIENTATION: MID

## 2022-12-21 ASSESSMENT — PAIN - FUNCTIONAL ASSESSMENT
PAIN_FUNCTIONAL_ASSESSMENT: ACTIVITIES ARE NOT PREVENTED
PAIN_FUNCTIONAL_ASSESSMENT: ACTIVITIES ARE NOT PREVENTED
PAIN_FUNCTIONAL_ASSESSMENT: PREVENTS OR INTERFERES WITH MANY ACTIVE NOT PASSIVE ACTIVITIES

## 2022-12-21 NOTE — PLAN OF CARE
Problem: Discharge Planning  Goal: Discharge to home or other facility with appropriate resources  12/21/2022 0437 by Penelope Billings RN  Outcome: Progressing  12/21/2022 0430 by Penelope Billings RN  Outcome: Progressing     Problem: Pain  Goal: Verbalizes/displays adequate comfort level or baseline comfort level  12/21/2022 0437 by Penelope Billings RN  Outcome: Progressing  12/21/2022 0430 by Penelope Billings RN  Outcome: Progressing     Problem: Safety - Adult  Goal: Free from fall injury  12/21/2022 0437 by Penelope Billings RN  Outcome: Progressing  12/21/2022 0430 by Penelope Billings RN  Outcome: Progressing     Problem: Nutrition Deficit:  Goal: Optimize nutritional status  12/21/2022 0437 by Penelope Billings RN  Outcome: Progressing  12/21/2022 0430 by Penelope Billings RN  Outcome: Progressing     Problem: Chronic Conditions and Co-morbidities  Goal: Patient's chronic conditions and co-morbidity symptoms are monitored and maintained or improved  12/21/2022 0437 by Penelope Billings RN  Outcome: Progressing  12/21/2022 0430 by Penelope Billings RN  Outcome: Progressing

## 2022-12-21 NOTE — PROGRESS NOTES
Infectious Diseases Progress Note    Patient:  Adin Pedraza  YOB: 1980  MRN: [id number]   Admit date: 12/16/2022   Admitting Physician: Destiny Trujillo MD  Primary Care Physician: MARÍA Duong NP    Chief Complaint/Interval History: He is feeling better. He still has some upper back and neck pain, but he indicates it is improving. I saw him this afternoon shortly after 5 PM.  At that time cervical and thoracic MRIs remain pending. He is without diarrhea or rash. No upper or lower extremity weakness or numbness. No cardiopulmonary symptoms.     In/Out    Intake/Output Summary (Last 24 hours) at 12/21/2022 1719  Last data filed at 12/21/2022 1605  Gross per 24 hour   Intake 1140 ml   Output 1275 ml   Net -135 ml     Allergies: No Known Allergies  Current Meds: pantoprazole (PROTONIX) tablet 40 mg, BID AC  insulin glargine (LANTUS) injection vial 22 Units, Nightly  insulin lispro (HUMALOG) injection vial 4 Units, TID WC  vancomycin (VANCOCIN) 1,000 mg in sodium chloride 0.9 % 250 mL IVPB, Q24H  guaiFENesin (MUCINEX) extended release tablet 600 mg, BID  benzonatate (TESSALON) capsule 100 mg, TID PRN  enoxaparin (LOVENOX) injection 40 mg, Daily  insulin lispro (HUMALOG) injection vial 0-4 Units, TID WC  insulin lispro (HUMALOG) injection vial 0-4 Units, Nightly  HYDROcodone-acetaminophen (NORCO) 5-325 MG per tablet 1 tablet, Q6H PRN  potassium chloride (KLOR-CON M) extended release tablet 40 mEq, PRN   Or  potassium bicarb-citric acid (EFFER-K) effervescent tablet 40 mEq, PRN   Or  potassium chloride 10 mEq/100 mL IVPB (Peripheral Line), PRN  naloxegol (MOVANTIK) tablet 12.5 mg, QAM AC  lactated ringers infusion, Continuous  vancomycin (VANCOCIN) intermittent dosing (placeholder), RX Placeholder  ondansetron (ZOFRAN) injection 4 mg, Q6H PRN  doxycycline (VIBRAMYCIN) 100 mg in sodium chloride 0.9 % 100 mL IVPB, Q12H  piperacillin-tazobactam (ZOSYN) 3,375 mg in sodium chloride 0.9 % 50 mL IVPB ([id number]), Q8H  glucose chewable tablet 16 g, PRN  dextrose bolus 10% 125 mL, PRN   Or  dextrose bolus 10% 250 mL, PRN  glucagon (rDNA) injection 1 mg, PRN  dextrose 10 % infusion, Continuous PRN  dextrose bolus 10% 125 mL, PRN   Or  dextrose bolus 10% 250 mL, PRN  magnesium sulfate 1000 mg in dextrose 5% 100 mL IVPB, PRN  sodium phosphate 10 mmol in sodium chloride 0.9 % 250 mL IVPB, PRN   Or  sodium phosphate 15 mmol in dextrose 5 % 250 mL IVPB, PRN   Or  sodium phosphate 20 mmol in dextrose 5 % 500 mL IVPB, PRN  polyethylene glycol (GLYCOLAX) packet 17 g, Daily PRN  nicotine (NICODERM CQ) 7 MG/24HR 1 patch, Daily      Review of Systems see HPI. Intact bowel and bladder function. VitalSigns:  /87   Pulse (!) 110   Temp 98.1 °F (36.7 °C)   Resp 18   Ht 6' (1.829 m)   Wt 138 lb 6 oz (62.8 kg)   SpO2 99%   BMI 18.77 kg/m²      Physical Exam  Line/IV site: No erythema, warmth, induration, or tenderness. Skin without rash  Heart without systolic or diastolic murmur  Upper and lower extremity strength and sensation intact    Lab Results:  CBC:   Recent Labs     12/19/22  0408 12/20/22  0250 12/21/22  0144   WBC 22.4* 16.7* 14.2*   HGB 9.2* 8.9* 9.7*    314 354     BMP:  Recent Labs     12/19/22  0408 12/20/22  0250 12/21/22  0144   * 133* 131*   K 4.5 4.4 3.9    100 98   CO2 17* 19* 20*   BUN 35* 35* 26*   CREATININE 1.9* 2.0* 2.0*   GLUCOSE 205* 364* 236*     CultureResults:  Blood cultures December 17, 2022-MRSA  Blood cultures December 19, 2022-MRSA  Blood cultures today-ordered/pending     Radiology:   MRI thoracic spine with and without contrast was completed on December 20, 2022-report remains pending  MRI cervical spine with and without contrast was completed on December 20, 2022-report remains pending    Additional Studies Reviewed:    2D echocardiogram December 19, 2022:  Conclusions  Summary  LV is normal in size with borderline normal LV systolic function.  LV ejection fraction estimated 50 to 55%. Mild concentric LVH. Diastolic parameters appear within normal range. RV is normal in size with normal systolic function. Left atrium appears normal in size. Right atrium appears normal in size. Aortic valve is trileaflet with normal leaflet mobility. No significant stenosis or regurgitation. Mitral valve appears structurally normal with normal leaflet mobility. No stenosis or significant regurgitation. No significant tricuspid regurgitation noted. No significant pericardial effusion. Signature  Electronically signed by Fredy Poole MD(Interpreting physician) on 12/19/2022 07:55 PM    Impression:  1. MRSA bacteremia  2. Diabetes mellitus  3. Upper thoracic and cervical neck pain-MRIs have been ordered to evaluate for discitis/paraspinal infection/epidural abscess-results remain pending  4. Chronic renal insufficiency    Recommendations:  Planning 4 weeks of IV antibiotic treatment following clearing of blood cultures  Going to change from vancomycin to daptomycin given renal dysfunction and easier to dose/manage in setting of renal insufficiency  Okay with me to proceed with PICC line placement as anticipate his blood cultures today will remain negative  Initiate home initiate process for home IV antibiotic arrangements  Await results of MRI scanning  See orders below    Home IV antibiotic orders:  1. Diagnosis-MRSA bacteremia  2. IV access-anticipate PICC line placement  3. Antibiotic order:  Daptomycin 500 mg IV daily  4. Laboratory monitoring:  CMP, CBC, CRP, CK every Monday while on IV antibiotic treatment  Last dose of daptomycin on 1/17/2023 (4 weeeks)  5.  Follow up 2-3 weeks after hospital discharge    Kasey Dorantes MD

## 2022-12-21 NOTE — PROGRESS NOTES
Hospitalist Progress Note  [facility]     Patient: Emelia Florez  : 1980  MRN: [id number]  Code Status: Full Code    Hospital Day: 5   Date of Service: 2022    Subjective:   Patient seen and examined. No current complaints. Past Medical History:   Diagnosis Date    Chronic kidney disease     COVID-19     Hypertension     Type 1 diabetes ([address]        Past Surgical History:   Procedure Laterality Date    ADENOIDECTOMY         History reviewed. No pertinent family history.     Social History     Socioeconomic History    Marital status: Single     Spouse name: Not on file    Number of children: Not on file    Years of education: Not on file    Highest education level: Not on file   Occupational History    Not on file   Tobacco Use    Smoking status: Every Day     Packs/day: 0.50     Types: Cigarettes    Smokeless tobacco: Never   Vaping Use    Vaping Use: Every day   Substance and Sexual Activity    Alcohol use: Not Currently    Drug use: Not Currently    Sexual activity: Not on file   Other Topics Concern    Not on file   Social History Narrative    Not on file     Social Determinants of Health     Financial Resource Strain: Not on file   Food Insecurity: Not on file   Transportation Needs: Not on file   Physical Activity: Not on file   Stress: Not on file   Social Connections: Not on file   Intimate Partner Violence: Not on file   Housing Stability: Not on file       Current Facility-Administered Medications   Medication Dose Route Frequency Provider Last Rate Last Admin    pantoprazole (PROTONIX) tablet 40 mg  40 mg Oral BID AC Alta Munson MD        insulin glargine (LANTUS) injection vial 22 Units  22 Units SubCUTAneous Nightly Sofia Jerez MD   22 Units at 22 2116    insulin lispro (HUMALOG) injection vial 4 Units  4 Units SubCUTAneous TID WC Sofia Jerez MD   4 Units at 22 0840    vancomycin (VANCOCIN) 1,000 mg in sodium chloride 0.9 % 250 mL IVPB 1,000 mg IntraVENous Q24H Rock Adams MD   Stopped at 12/20/22 2107    oseltamivir 6mg/ml (TAMIFLU) suspension 30 mg  30 mg Oral BID Pancho Chapman MD   30 mg at 12/21/22 9333    guaiFENesin [facility]) extended release tablet 600 mg  600 mg Oral BID Rock Adams MD   600 mg at 12/21/22 7513    benzonatate (TESSALON) capsule 100 mg  100 mg Oral TID PRN Rock Adams MD   100 mg at 12/19/22 1520    enoxaparin (LOVENOX) injection 40 mg  40 mg SubCUTAneous Daily Pancho Chapman MD   40 mg at 12/21/22 0839    insulin lispro (HUMALOG) injection vial 0-4 Units  0-4 Units SubCUTAneous TID WC Rock Adams MD   1 Units at 12/20/22 1732    insulin lispro (HUMALOG) injection vial 0-4 Units  0-4 Units SubCUTAneous Nightly Rock Adams MD   4 Units at 12/20/22 2117    HYDROcodone-acetaminophen (NORCO) 5-325 MG per tablet 1 tablet  1 tablet Oral Q6H PRN Rock Adams MD   1 tablet at 12/21/22 0659    potassium chloride (KLOR-CON M) extended release tablet 40 mEq  40 mEq Oral PRN Rock Adams MD        Or    potassium bicarb-citric acid (EFFER-K) effervescent tablet 40 mEq  40 mEq Oral PRN Rock Adams MD        Or    potassium chloride 10 mEq/100 mL IVPB (Peripheral Line)  10 mEq IntraVENous PRN Rock Adams MD        naloxegol (MOVANTIK) tablet 12.5 mg  12.5 mg Oral QAM AC Pancho Chapman MD   12.5 mg at 12/21/22 9318    lactated ringers infusion   IntraVENous Continuous Rock Adams MD 50 mL/hr at 12/21/22 0840 New Bag at 12/21/22 0840    vancomycin (VANCOCIN) intermittent dosing (placeholder)   Other RX Placeholder Rock Adams MD        ondansetron [facility]) injection 4 mg  4 mg IntraVENous Q6H PRN Pancho Chapman MD   4 mg at 12/17/22 2056    doxycycline (VIBRAMYCIN) 100 mg in sodium chloride 0.9 % 100 mL IVPB  100 mg IntraVENous Q12H Rock dAams MD   Stopped at 12/21/22 0458    piperacillin-tazobactam (ZOSYN) 3,375 mg in sodium chloride 0.9 % 50 mL IVPB (Efyi0Zin) 3,375 mg IntraVENous Q8H Sweetie Hebert MD   Stopped at 12/21/22 1055    glucose chewable tablet 16 g  4 tablet Oral PRN Radha Vincent MD        dextrose bolus 10% 125 mL  125 mL IntraVENous PRN Radha Vincent MD        Or    dextrose bolus 10% 250 mL  250 mL IntraVENous PRN Radha Vincent MD        glucagon (rDNA) injection 1 mg  1 mg SubCUTAneous PRN Radha Vincent MD        dextrose 10 % infusion   IntraVENous Continuous PRN Radha Vincent MD        dextrose bolus 10% 125 mL  125 mL IntraVENous PRN Farzad Anaya MD        Or    dextrose bolus 10% 250 mL  250 mL IntraVENous PRN Farzad Anaya MD        magnesium sulfate 1000 mg in dextrose 5% 100 mL IVPB  1,000 mg IntraVENous PRN Farzad Anaya MD   Stopped at 12/18/22 2022    sodium phosphate 10 mmol in sodium chloride 0.9 % 250 mL IVPB  10 mmol IntraVENous PRN Farzad Anaya MD        Or    sodium phosphate 15 mmol in dextrose 5 % 250 mL IVPB  15 mmol IntraVENous PRN Farzad Anaya MD   Stopped at 12/18/22 2108    Or    sodium phosphate 20 mmol in dextrose 5 % 500 mL IVPB  20 mmol IntraVENous PRN Farzad Anaya MD        polyethylene glycol (GLYCOLAX) packet 17 g  17 g Oral Daily PRN Farzad Anaya MD        nicotine (NICODERM CQ) 7 MG/24HR 1 patch  1 patch TransDERmal Daily Farzad Anaya MD   1 patch at 12/20/22 0834         lactated ringers 50 mL/hr at 12/21/22 0840    dextrose          Objective:   /87   Pulse (!) 109   Temp 97.9 °F (36.6 °C)   Resp 20   Ht 6' (1.829 m)   Wt 138 lb 6 oz (62.8 kg)   SpO2 98%   BMI 18.77 kg/m²     General: no acute distress  HEENT: normocephalic  Neck: supple, symmetrical, trachea midline   Lungs: clear to auscultation bilaterally  Cardiovascular: s1 and s2 normal  Abdomen: soft, positive bowel sounds, nondistended, nontender  Extremities: no edema or cyanosis   Neuro: aaox3, no focal deficits    Recent Labs 12/19/22  0408 12/20/22  0250 12/21/22  0144   WBC 22.4* 16.7* 14.2*   RBC 3.02* 3.01* 3.27*   HGB 9.2* 8.9* 9.7*   HCT 27.0* 27.1* 29.2*   MCV 89.4 90.0 89.3   MCH 30.5 29.6 29.7   MCHC 34.1 32.8* 33.2    314 354     Recent Labs     12/19/22  0408 12/20/22  0250 12/21/22  0144   * 133* 131*   K 4.5 4.4 3.9   ANIONGAP 13 14 13    100 98   CO2 17* 19* 20*   BUN 35* 35* 26*   CREATININE 1.9* 2.0* 2.0*   GLUCOSE 205* 364* 236*   CALCIUM 9.2 8.9 8.9     Recent Labs     12/18/22  1039 12/18/22  1516 12/18/22 2128 12/19/22  0408 12/20/22  0250 12/21/22  0144   MG 1.8 1.3* 2.8* 2.4 1.8 1.5*   PHOS 2.5 1.7* 3.1  --   --   --      No results for input(s): AST, ALT, ALB, BILITOT, ALKPHOS, ALB in the last 72 hours. No results for input(s): PH, PO2, PCO2, HCO3, BE, O2SAT in the last 72 hours. No results for input(s): TROPONINI in the last 72 hours. No results for input(s): INR in the last 72 hours. No results for input(s): LACTA in the last 72 hours. Intake/Output Summary (Last 24 hours) at 12/21/2022 0958  Last data filed at 12/21/2022 0937  Gross per 24 hour   Intake 1140 ml   Output 425 ml   Net 715 ml       No results found.      Assessment and Plan:   MRSA bacteremia  Influenza A infection  Multifocal pneumonia  Rhinovirus infection  DKA  PETROS  Tobacco dependence    ID following  Work-up in progress  Vancomycin remains on board  Follow repeat blood cultures  TTE 12/19/2022 without mention of endocarditis  Oseltamavir  Broad-spectrum antibiotics  Admits to noncompliance with insulin counseled  DKA resolved  Insulin gtt since transitioned to sc insulin  HbA1c 12.3 poor control counseled  Unsure of baseline creatinine  Creatinine improved overall  Counseled on cessation of tobacco  Lovenox for DVT prophylaxis  Discussed treatment plan with patient/RN/ID    Marietta Urrutia MD   12/21/2022 9:58 AM

## 2022-12-21 NOTE — PROGRESS NOTES
Pharmacy Intravenous to Oral Protocol    Medication changed per [facility] IV to PO protocol: Pantoprazole    Patient meets the following inclusion criteria and none of the exclusion criteria:    Inclusion criteria:  - IV therapy > 24 hours (antibiotics only)  - Tolerating diet more advanced than clear liquids  - Tolerating PO medications  - No vasopressor blood pressure support (ie no signs of shock)  - Patient hasn't had a seizure for 72 hrs (antiepileptic medications only)    Exclusion criteria:  - Infections requiring IV therapy (ie meningitis, endocarditis, osteomyelitis, pancreatitis)   - Nausea and/or vomiting or severe diarrhea within past 24 hours   - Has gastrectomy, ileus, gastric outlet or bowel obstruction, or malabsorption syndromes   - Has significant painful oral ulceration   - TPN with an NPO order   - Active GI bleed   - Unable to swallow   - NPO   - Febrile in the last 24 hours (antibiotics only)   - Clinical deteriorating or unstable (antibiotics only)   - Pediatric patients and patients who are not euthyroid (not on oral levothyroxine/not stabilized on oral levothyroxine)- Levothyroxine only     Electronically signed by Marvin Ruiz [facility] on 12/21/2022 at 9:46 AM

## 2022-12-21 NOTE — CONSULTS
[facility] Vascular Access Team:  Consult Note    Patient: Esperanza Martin  YOB: 1980   MRN: [id number]  Room: [address] [place] [address]     Attending Physician: Chelsey Frank MD  Ordering Physician: Terry Dobbs MD    Diagnosis:   DKA, type 2, not at goal [facility]) [E11.10]  Pulmonary infiltrates [R91.8]  Diabetic ketoacidosis without coma associated with type 1 diabetes mellitus ([facility] [E10.10]    Active LDAs:  Peripheral IV 12/20/22 Right Forearm (Active)   Number of days: 0       Reason for Consult:  [facility] vascular access team consulted for placement of PICC/Midline. Indication(s):  Esperanza Martin is a 38 y.o. male admitted to [address] [place] [address] for DKA, type 2, not at goal [facility]). Esperanza Martin is currently expected to be on 4 weeks of IV antbiotic therapy per Dr. Anderson Spotted 12/21 note:     Recommendations:  Continue vancomycin  Monitor for metastatic foci of staph infection  Await cervical and thoracic MRI scans to evaluate for discitis/paraspinal infection  Complete 5-day course of oseltamavir  Follow-up blood cultures to make sure bacteremia clearing  Planning 4-week course of IV vancomycin or possibly daptomycin treatment  PICC line placement when blood cultures are clear    Findings:  Note states PICC placement once cultures are clear. Most recent blood cultures remain positive as of today. There will be no PICC RN coverage until Monday, 12/26. Will plan to place PICC line that day as long as cultures are clear for 48 hours.       Past Medical History:   Diagnosis Date    Chronic kidney disease     COVID-19     Hypertension     Type 1 diabetes ([facility]        Recent Labs     Units 12/21/22  0144 12/20/22  0250 12/19/22  0829   BC   --   --  Gram stain Anaerobic bottle:  Gram stain Aerobic bottle:  Gram positive cocci in clusters  resembling Staphylococcus  Culture in progress  Please notify Physician   Bottle volume = >10 ml  *   BLOODCULT2   --   --   Bottle volume = 10 ml*  CONTACT PRECAUTIONS INDICATED  No further workup  Refer to (blood culture collected [email or web address]) for sensitivity results  Isolated from Anaerobic bottle  *   WBC K/uL 14.2*   < >  --    PLT K/uL 354   < >  --    CREATININE mg/dL 2.0*   < >  --     < > = values in this interval not displayed. Impression/Plan:  1. Plan for PICC line placement Monday, 12/26 if cultures are clear due to no PICC RN coverage. Thank you for your time and consult.      Electronically Signed By: Fam Plaza RN on 12/21/2022 at 12:07 PM

## 2022-12-21 NOTE — PROGRESS NOTES
Infectious Diseases Progress Note    Patient:  Adin Pedraza  YOB: 1980  MRN: [id number]   Admit date: 12/16/2022   Admitting Physician: Destiny Trujillo MD  Primary Care Physician: MARÍA Duong NP    Chief Complaint/Interval History: He seems to be feeling better. He is more comfortable. He still has some upper back and neck pain, but indicates that shown some improvement. He has had some mild cough. Not much production to his cough. He is without nausea, diarrhea, or rash. Does not report any upper extremity or lower extremity weakness or numbness. He had his MRI scans of the cervical and thoracic spine, but the results remain pending.     In/Out    Intake/Output Summary (Last 24 hours) at 12/20/2022 1820  Last data filed at 12/20/2022 1458  Gross per 24 hour   Intake 1180 ml   Output --   Net 1180 ml     Allergies: No Known Allergies  Current Meds: insulin glargine (LANTUS) injection vial 22 Units, Nightly  insulin lispro (HUMALOG) injection vial 4 Units, TID WC  oseltamivir 6mg/ml (TAMIFLU) suspension 30 mg, BID  guaiFENesin (MUCINEX) extended release tablet 600 mg, BID  benzonatate (TESSALON) capsule 100 mg, TID PRN  enoxaparin (LOVENOX) injection 40 mg, Daily  insulin lispro (HUMALOG) injection vial 0-4 Units, TID WC  insulin lispro (HUMALOG) injection vial 0-4 Units, Nightly  HYDROcodone-acetaminophen (NORCO) 5-325 MG per tablet 1 tablet, Q6H PRN  potassium chloride (KLOR-CON M) extended release tablet 40 mEq, PRN   Or  potassium bicarb-citric acid (EFFER-K) effervescent tablet 40 mEq, PRN   Or  potassium chloride 10 mEq/100 mL IVPB (Peripheral Line), PRN  naloxegol (MOVANTIK) tablet 12.5 mg, QAM AC  lactated ringers infusion, Continuous  vancomycin (VANCOCIN) intermittent dosing (placeholder), RX Placeholder  ondansetron (ZOFRAN) injection 4 mg, Q6H PRN  pantoprazole (PROTONIX) 40 mg in sodium chloride (PF) 0.9 % 10 mL injection, 2 times per day  doxycycline (VIBRAMYCIN) 100 mg in sodium chloride 0.9 % 100 mL IVPB, Q12H  piperacillin-tazobactam (ZOSYN) 3,375 mg in sodium chloride 0.9 % 50 mL IVPB ([id number]), Q8H  glucose chewable tablet 16 g, PRN  dextrose bolus 10% 125 mL, PRN   Or  dextrose bolus 10% 250 mL, PRN  glucagon (rDNA) injection 1 mg, PRN  dextrose 10 % infusion, Continuous PRN  dextrose bolus 10% 125 mL, PRN   Or  dextrose bolus 10% 250 mL, PRN  magnesium sulfate 1000 mg in dextrose 5% 100 mL IVPB, PRN  sodium phosphate 10 mmol in sodium chloride 0.9 % 250 mL IVPB, PRN   Or  sodium phosphate 15 mmol in dextrose 5 % 250 mL IVPB, PRN   Or  sodium phosphate 20 mmol in dextrose 5 % 500 mL IVPB, PRN  polyethylene glycol (GLYCOLAX) packet 17 g, Daily PRN  nicotine (NICODERM CQ) 7 MG/24HR 1 patch, Daily      Review of Systems see HPI    VitalSigns:  BP (!) 138/97   Pulse 99   Temp 97 °F (36.1 °C) (Oral)   Resp 17   Ht 6' (1.829 m)   Wt 137 lb 1.6 oz (62.2 kg)   SpO2 98%   BMI 18.59 kg/m²      Physical Exam  Line/IV site: No erythema, warmth, induration, or tenderness. Heart distant heart sounds.   Did not appreciate a systolic or diastolic murmur  Lungs clear to auscultation without crackles or wheezes  Abdomen is soft and nontender  Extremities no splinter hemorrhages or Janeway lesions    Lab Results:  CBC:   Recent Labs     12/18/22  0421 12/19/22  0408 12/20/22  0250   WBC 22.4* 22.4* 16.7*   HGB 9.0* 9.2* 8.9*    289 314     BMP:  Recent Labs     12/18/22  2128 12/19/22  0408 12/20/22  0250   * 131* 133*   K 4.6 4.5 4.4    101 100   CO2 17* 17* 19*   BUN 38* 35* 35*   CREATININE 1.9* 1.9* 2.0*   GLUCOSE 269* 205* 364*     CultureResults:  Blood cultures December 17, 2022-no growth  Blood cultures December 17, 2022-MRSA (susceptibility below)  Blood cultures December 19, 2022-gram-positive cocci in clusters  Blood cultures December 19, 2020-gram-positive cocci in clusters    Methicillin-Resistant Staphylococcus aureus (1)    Antibiotic Interpretation Microscan  Method Status    benzylpenicillin Resistant >=0.5 mcg/mL BACTERIAL SUSCEPTIBILITY PANEL BY TYRA     clindamycin Sensitive 0.25 mcg/mL BACTERIAL SUSCEPTIBILITY PANEL BY TYRA     erythromycin Resistant >=8 mcg/mL BACTERIAL SUSCEPTIBILITY PANEL BY TYRA     inducible clindamycin resistance  Neg mcg/mL BACTERIAL SUSCEPTIBILITY PANEL BY TYRA     oxacillin Resistant >=4 mcg/mL BACTERIAL SUSCEPTIBILITY PANEL BY TYRA     tetracycline Sensitive <=1 mcg/mL BACTERIAL SUSCEPTIBILITY PANEL BY TYRA     trimethoprim-sulfamethoxazole Sensitive <=10 mcg/mL BACTERIAL SUSCEPTIBILITY PANEL BY TYRA     vancomycin Sensitive 1 mcg/mL BACTERIAL SUSCEPTIBILITY PANEL BY TYRA       Radiology:  MRI cervical spine without with contrast-results pending  MRI thoracic spine without and with contrast-results remain pending    Additional Studies Reviewed:  None    Impression:  1. MRSA bacteremia  2. Respiratory virus PCR panel-rhinovirus and influenza A  3. Diabetic ketoacidosis-improving  4. Diabetes mellitus  5. Upper back and neck pain-showing some improvement-MRIs pending  6.   Acute on chronic renal failure-stable    Recommendations:  Continue vancomycin  Monitor for metastatic foci of staph infection  Await cervical and thoracic MRI scans to evaluate for discitis/paraspinal infection  Complete 5-day course of oseltamavir  Follow-up blood cultures to make sure bacteremia clearing  Planning 4-week course of IV vancomycin or possibly daptomycin treatment  PICC line placement when blood cultures are clear    Christine Carson MD

## 2022-12-21 NOTE — PROGRESS NOTES
[address] [facility] Pharmacokinetic Monitoring Service - Vancomycin    Consulting Provider: Dr. Mk Blair   Indication: Pneumonia (CAP)  Target Concentration: Goal AUC/TYRA 400-600 mg*hr/L  Day of Therapy: 4  Additional Antimicrobials: Zosyn    Pertinent Laboratory Values: Wt Readings from Last 1 Encounters:   12/20/22 137 lb 1.6 oz (62.2 kg)     Temp Readings from Last 1 Encounters:   12/20/22 97 °F (36.1 °C) (Oral)     Estimated Creatinine Clearance: 41 mL/min (A) (based on SCr of 2 mg/dL (H)). Recent Labs     12/19/22  0408 12/20/22  0250   CREATININE 1.9* 2.0*   WBC 22.4* 16.7*     Procalcitonin: none ordered  Pertinent Cultures:  Culture Date Source Results   12/16/22 Respiratory panel Rhinovirus  Influenza A   12/17/22 Blood x2 MRSA   12/19/22 Blood in 1 of 2 Gm + cocci in clusters resembling Staph   MRSA Nasal Swab: showed MRSA positive result on 12/18/22    Recent vancomycin administrations                     vancomycin (VANCOCIN) 1000 mg in sodium chloride 0.9% 250 mL IVPB (mg) 1,000 mg New Bag 12/19/22 1621    vancomycin (VANCOCIN) 1000 mg in sodium chloride 0.9% 250 mL IVPB (mg) 1,000 mg New Bag 12/18/22 1458                    Assessment:  Date/Time Current Dose Concentration Timing of Concentration (h) AUC   Sarai@VANCL 1000mg (approx 24hr) 10.5 24h 34m 449   Note: Serum concentrations collected for AUC dosing may appear elevated if collected in close proximity to the dose administered, this is not necessarily an indication of toxicity    Plan:  Current dosing regimen is therapeutic  Start Vancomycin 1000mg IV q24hr on schedule , mixing in NS. Repeat vancomycin concentration in 4 days if continued beyond 7 days or Scr rising.   Pharmacy will continue to monitor patient and adjust therapy as indicated    Thank you for the consult,  Nury Weaver, [facility]  12/20/2022 6:14 PM

## 2022-12-22 LAB
ANION GAP SERPL CALCULATED.3IONS-SCNC: 12 MMOL/L (ref 7–19)
ATYPICAL LYMPHOCYTE RELATIVE PERCENT: 3 % (ref 0–8)
BANDED NEUTROPHILS RELATIVE PERCENT: 4 % (ref 0–5)
BASOPHILS ABSOLUTE: 0 K/UL (ref 0–0.2)
BASOPHILS RELATIVE PERCENT: 0 % (ref 0–1)
BLOOD CULTURE, ROUTINE: ABNORMAL
BLOOD CULTURE, ROUTINE: ABNORMAL
BLOOD CULTURE, ROUTINE: NORMAL
BUN BLDV-MCNC: 26 MG/DL (ref 6–20)
CALCIUM SERPL-MCNC: 8.8 MG/DL (ref 8.6–10)
CHLORIDE BLD-SCNC: 97 MMOL/L (ref 98–111)
CO2: 21 MMOL/L (ref 22–29)
CREAT SERPL-MCNC: 1.8 MG/DL (ref 0.5–1.2)
CULTURE, BLOOD 2: ABNORMAL
CULTURE, BLOOD 2: ABNORMAL
EOSINOPHILS ABSOLUTE: 0 K/UL (ref 0–0.6)
EOSINOPHILS RELATIVE PERCENT: 0 % (ref 0–5)
GFR SERPL CREATININE-BSD FRML MDRD: 47 ML/MIN/{1.73_M2}
GLUCOSE BLD-MCNC: 165 MG/DL (ref 70–99)
GLUCOSE BLD-MCNC: 175 MG/DL (ref 74–109)
GLUCOSE BLD-MCNC: 201 MG/DL (ref 70–99)
GLUCOSE BLD-MCNC: 317 MG/DL (ref 70–99)
GLUCOSE BLD-MCNC: 360 MG/DL (ref 70–99)
GLUCOSE BLD-MCNC: 426 MG/DL (ref 70–99)
HCT VFR BLD CALC: 29.5 % (ref 42–52)
HEMOGLOBIN: 9.6 G/DL (ref 14–18)
IMMATURE GRANULOCYTES #: 1.2 K/UL
LYMPHOCYTES ABSOLUTE: 1.3 K/UL (ref 1.1–4.5)
LYMPHOCYTES RELATIVE PERCENT: 6 % (ref 20–40)
MAGNESIUM: 1.5 MG/DL (ref 1.6–2.6)
MCH RBC QN AUTO: 29.4 PG (ref 27–31)
MCHC RBC AUTO-ENTMCNC: 32.5 G/DL (ref 33–37)
MCV RBC AUTO: 90.5 FL (ref 80–94)
METAMYELOCYTES RELATIVE PERCENT: 1 %
MONOCYTES ABSOLUTE: 2.3 K/UL (ref 0–0.9)
MONOCYTES RELATIVE PERCENT: 16 % (ref 0–10)
NEUTROPHILS ABSOLUTE: 10.6 K/UL (ref 1.5–7.5)
NEUTROPHILS RELATIVE PERCENT: 70 % (ref 50–65)
ORGANISM: ABNORMAL
ORGANISM: ABNORMAL
PDW BLD-RTO: 12.8 % (ref 11.5–14.5)
PERFORMED ON: ABNORMAL
PLATELET # BLD: 414 K/UL (ref 130–400)
PLATELET SLIDE REVIEW: ABNORMAL
PMV BLD AUTO: 10.1 FL (ref 9.4–12.4)
POTASSIUM SERPL-SCNC: 4.3 MMOL/L (ref 3.5–5)
RBC # BLD: 3.26 M/UL (ref 4.7–6.1)
RBC # BLD: NORMAL 10*6/UL
SODIUM BLD-SCNC: 130 MMOL/L (ref 136–145)
WBC # BLD: 14.1 K/UL (ref 4.8–10.8)

## 2022-12-22 PROCEDURE — 1210000000 HC MED SURG R&B

## 2022-12-22 PROCEDURE — 36415 COLL VENOUS BLD VENIPUNCTURE: CPT

## 2022-12-22 PROCEDURE — 82947 ASSAY GLUCOSE BLOOD QUANT: CPT

## 2022-12-22 PROCEDURE — 2580000003 HC RX 258: Performed by: INTERNAL MEDICINE

## 2022-12-22 PROCEDURE — 6370000000 HC RX 637 (ALT 250 FOR IP): Performed by: INTERNAL MEDICINE

## 2022-12-22 PROCEDURE — 85025 COMPLETE CBC W/AUTO DIFF WBC: CPT

## 2022-12-22 PROCEDURE — 83735 ASSAY OF MAGNESIUM: CPT

## 2022-12-22 PROCEDURE — 6370000000 HC RX 637 (ALT 250 FOR IP): Performed by: HOSPITALIST

## 2022-12-22 PROCEDURE — 6360000002 HC RX W HCPCS: Performed by: HOSPITALIST

## 2022-12-22 PROCEDURE — 80048 BASIC METABOLIC PNL TOTAL CA: CPT

## 2022-12-22 PROCEDURE — 94760 N-INVAS EAR/PLS OXIMETRY 1: CPT

## 2022-12-22 PROCEDURE — 6360000002 HC RX W HCPCS: Performed by: INTERNAL MEDICINE

## 2022-12-22 RX ORDER — MAGNESIUM SULFATE IN WATER 40 MG/ML
2000 INJECTION, SOLUTION INTRAVENOUS ONCE
Status: COMPLETED | OUTPATIENT
Start: 2022-12-22 | End: 2022-12-22

## 2022-12-22 RX ADMIN — PANTOPRAZOLE SODIUM 40 MG: 40 TABLET, DELAYED RELEASE ORAL at 16:51

## 2022-12-22 RX ADMIN — HYDROCODONE BITARTRATE AND ACETAMINOPHEN 1 TABLET: 5; 325 TABLET ORAL at 18:40

## 2022-12-22 RX ADMIN — DAPTOMYCIN 500 MG: 500 INJECTION, POWDER, LYOPHILIZED, FOR SOLUTION INTRAVENOUS at 23:43

## 2022-12-22 RX ADMIN — INSULIN GLARGINE 22 UNITS: 100 INJECTION, SOLUTION SUBCUTANEOUS at 20:54

## 2022-12-22 RX ADMIN — INSULIN LISPRO 4 UNITS: 100 INJECTION, SOLUTION INTRAVENOUS; SUBCUTANEOUS at 17:31

## 2022-12-22 RX ADMIN — NALOXEGOL OXALATE 12.5 MG: 12.5 TABLET, FILM COATED ORAL at 06:44

## 2022-12-22 RX ADMIN — HYDROCODONE BITARTRATE AND ACETAMINOPHEN 1 TABLET: 5; 325 TABLET ORAL at 06:44

## 2022-12-22 RX ADMIN — GUAIFENESIN 600 MG: 600 TABLET ORAL at 20:54

## 2022-12-22 RX ADMIN — INSULIN LISPRO 3 UNITS: 100 INJECTION, SOLUTION INTRAVENOUS; SUBCUTANEOUS at 12:38

## 2022-12-22 RX ADMIN — INSULIN LISPRO 4 UNITS: 100 INJECTION, SOLUTION INTRAVENOUS; SUBCUTANEOUS at 12:42

## 2022-12-22 RX ADMIN — BENZONATATE 100 MG: 100 CAPSULE ORAL at 20:53

## 2022-12-22 RX ADMIN — MAGNESIUM SULFATE HEPTAHYDRATE 2000 MG: 40 INJECTION, SOLUTION INTRAVENOUS at 11:31

## 2022-12-22 RX ADMIN — HYDROCODONE BITARTRATE AND ACETAMINOPHEN 1 TABLET: 5; 325 TABLET ORAL at 12:44

## 2022-12-22 RX ADMIN — INSULIN LISPRO 4 UNITS: 100 INJECTION, SOLUTION INTRAVENOUS; SUBCUTANEOUS at 20:54

## 2022-12-22 RX ADMIN — PANTOPRAZOLE SODIUM 40 MG: 40 TABLET, DELAYED RELEASE ORAL at 06:44

## 2022-12-22 RX ADMIN — INSULIN LISPRO 4 UNITS: 100 INJECTION, SOLUTION INTRAVENOUS; SUBCUTANEOUS at 09:14

## 2022-12-22 RX ADMIN — GUAIFENESIN 600 MG: 600 TABLET ORAL at 08:04

## 2022-12-22 RX ADMIN — ENOXAPARIN SODIUM 40 MG: 100 INJECTION SUBCUTANEOUS at 08:04

## 2022-12-22 ASSESSMENT — PAIN SCALES - WONG BAKER: WONGBAKER_NUMERICALRESPONSE: 0

## 2022-12-22 ASSESSMENT — PAIN - FUNCTIONAL ASSESSMENT
PAIN_FUNCTIONAL_ASSESSMENT: ACTIVITIES ARE NOT PREVENTED

## 2022-12-22 ASSESSMENT — PAIN DESCRIPTION - ORIENTATION
ORIENTATION: MID
ORIENTATION: MID;LOWER

## 2022-12-22 ASSESSMENT — PAIN DESCRIPTION - DESCRIPTORS
DESCRIPTORS: ACHING;DULL;SORE
DESCRIPTORS: ACHING;DULL;NAGGING;SORE
DESCRIPTORS: ACHING

## 2022-12-22 ASSESSMENT — PAIN DESCRIPTION - LOCATION
LOCATION: BACK;NECK

## 2022-12-22 ASSESSMENT — PAIN SCALES - GENERAL
PAINLEVEL_OUTOF10: 8
PAINLEVEL_OUTOF10: 2
PAINLEVEL_OUTOF10: 7
PAINLEVEL_OUTOF10: 7

## 2022-12-22 NOTE — CARE COORDINATION
Home IV ABX orders faxed to [facility] for coverage/pricing; per RN-Max; PICC cannot be placed until Monday; per RN Tawny Morales, Pt also will not have [facility]; at d/c will have to discuss Pt going to an OPIT for dressing changes and labwork; following for needs. Electronically signed by EDMUNDO Peoples on 12/22/2022 at 12:04 PM    12/21/22 2130  Inpatient consult to Social Work  ONE TIME     Complete  Discontinue     Comments: Home IV antibiotic orders:   1. Diagnosis-MRSA bacteremia   2. IV access-anticipate PICC line placement   3. Antibiotic order:   Daptomycin 500 mg IV daily   4. Laboratory monitoring:   CMP, CBC, CRP, CK every Monday while on IV antibiotic treatment   Last dose of daptomycin on 1/17/2023 (4 weeeks)   5. Follow up 2-3 weeks after hospital discharge     Nadiya Mendieta MD   Provider: (Not yet assigned)   Question: Reason for Consult?  Answer: Please assist with home iv antibiotic orders

## 2022-12-22 NOTE — PROGRESS NOTES
Hospitalist Progress Note  [facility]     Patient: Clementine Kirby  : 1980  MRN: [id number]  Code Status: Full Code    Hospital Day: 6   Date of Service: 2022    Subjective:   Patient seen and examined. No current complaints. Past Medical History:   Diagnosis Date    Chronic kidney disease     COVID-19     Hypertension     Type 1 diabetes ([address]        Past Surgical History:   Procedure Laterality Date    ADENOIDECTOMY         History reviewed. No pertinent family history.     Social History     Socioeconomic History    Marital status: Single     Spouse name: Not on file    Number of children: Not on file    Years of education: Not on file    Highest education level: Not on file   Occupational History    Not on file   Tobacco Use    Smoking status: Every Day     Packs/day: 0.50     Types: Cigarettes    Smokeless tobacco: Never   Vaping Use    Vaping Use: Every day   Substance and Sexual Activity    Alcohol use: Not Currently    Drug use: Not Currently    Sexual activity: Not on file   Other Topics Concern    Not on file   Social History Narrative    Not on file     Social Determinants of Health     Financial Resource Strain: Not on file   Food Insecurity: Not on file   Transportation Needs: Not on file   Physical Activity: Not on file   Stress: Not on file   Social Connections: Not on file   Intimate Partner Violence: Not on file   Housing Stability: Not on file       Current Facility-Administered Medications   Medication Dose Route Frequency Provider Last Rate Last Admin    pantoprazole (PROTONIX) tablet 40 mg  40 mg Oral BID AC Ayaan Sánchez MD   40 mg at 22 0644    DAPTOmycin (CUBICIN) 500 mg in sodium chloride 0.9 % 50 mL IVPB  8 mg/kg IntraVENous Q24H Cody Chaves MD   Stopped at 22 4300    insulin glargine (LANTUS) injection vial 22 Units  22 Units SubCUTAneous Nightly Marietta Urrutia MD   22 Units at 22 2053    insulin lispro (HUMALOG) injection vial 4 Units  4 Units SubCUTAneous TID DESIREE Sandoval MD   4 Units at 12/22/22 0914    guaiFENesin [facility]) extended release tablet 600 mg  600 mg Oral BID Greer Sandoval MD   600 mg at 12/22/22 0804    benzonatate (TESSALON) capsule 100 mg  100 mg Oral TID PRN Greer Sandoval MD   100 mg at 12/21/22 2319    enoxaparin (LOVENOX) injection 40 mg  40 mg SubCUTAneous Daily Benny Sainz MD   40 mg at 12/22/22 0804    insulin lispro (HUMALOG) injection vial 0-4 Units  0-4 Units SubCUTAneous TID  Greer Sandoval MD   2 Units at 12/21/22 1706    insulin lispro (HUMALOG) injection vial 0-4 Units  0-4 Units SubCUTAneous Nightly Greer Sandoval MD   4 Units at 12/21/22 2053    HYDROcodone-acetaminophen (Jorge Glee) 5-325 MG per tablet 1 tablet  1 tablet Oral Q6H PRN Greer Sandoval MD   1 tablet at 12/22/22 0644    potassium chloride (KLOR-CON M) extended release tablet 40 mEq  40 mEq Oral PRN Greer Sandoval MD        Or    potassium bicarb-citric acid (EFFER-K) effervescent tablet 40 mEq  40 mEq Oral PRN Greer Sandoval MD        Or    potassium chloride 10 mEq/100 mL IVPB (Peripheral Line)  10 mEq IntraVENous PRN Greer Sandoval MD        naloxegol (MOVANTIK) tablet 12.5 mg  12.5 mg Oral QAM AC Benny Sainz MD   12.5 mg at 12/22/22 5677    lactated ringers infusion   IntraVENous Continuous Greer Sandoval MD 50 mL/hr at 12/21/22 2315 New Bag at 12/21/22 2315    ondansetron (ZOFRAN) injection 4 mg  4 mg IntraVENous Q6H PRN Benny Sainz MD   4 mg at 12/17/22 2056    glucose chewable tablet 16 g  4 tablet Oral PRN Lorenzo Moreno MD        dextrose bolus 10% 125 mL  125 mL IntraVENous PRN Lorenzo Moreno MD        Or    dextrose bolus 10% 250 mL  250 mL IntraVENous PRN Lorenzo Moreno MD        glucagon (rDNA) injection 1 mg  1 mg SubCUTAneous PRN Lorenzo Moreno MD        dextrose 10 % infusion   IntraVENous Continuous PRN Lorenzo Moreno MD        dextrose bolus 10% 125 mL 125 mL IntraVENous PRN Alta Munson MD        Or    dextrose bolus 10% 250 mL  250 mL IntraVENous PRN Alta Munson MD        magnesium sulfate 1000 mg in dextrose 5% 100 mL IVPB  1,000 mg IntraVENous PRN Alta Munson MD   Stopped at 12/18/22 2022    sodium phosphate 10 mmol in sodium chloride 0.9 % 250 mL IVPB  10 mmol IntraVENous PRN Alta Munson MD        Or    sodium phosphate 15 mmol in dextrose 5 % 250 mL IVPB  15 mmol IntraVENous PRN Alta Munson MD   Stopped at 12/18/22 2108    Or    sodium phosphate 20 mmol in dextrose 5 % 500 mL IVPB  20 mmol IntraVENous PRN Alta Munson MD        polyethylene glycol (GLYCOLAX) packet 17 g  17 g Oral Daily PRN Alta Munson MD        nicotine (NICODERM CQ) 7 MG/24HR 1 patch  1 patch TransDERmal Daily Alta Munson MD   1 patch at 12/22/22 0816         lactated ringers 50 mL/hr at 12/21/22 2315    dextrose          Objective:   /71   Pulse 86   Temp 97.8 °F (36.6 °C) (Oral)   Resp 16   Ht 6' (1.829 m)   Wt 137 lb 7 oz (62.3 kg)   SpO2 98%   BMI 18.64 kg/m²     General: no acute distress  HEENT: normocephalic  Neck: supple, symmetrical, trachea midline   Lungs: clear to auscultation bilaterally  Cardiovascular: s1 and s2 normal  Abdomen: soft, positive bowel sounds, nondistended, nontender  Extremities: no edema or cyanosis   Neuro: aaox3, no focal deficits    Recent Labs     12/20/22  0250 12/21/22  0144 12/22/22 0213   WBC 16.7* 14.2* 14.1*   RBC 3.01* 3.27* 3.26*   HGB 8.9* 9.7* 9.6*   HCT 27.1* 29.2* 29.5*   MCV 90.0 89.3 90.5   MCH 29.6 29.7 29.4   MCHC 32.8* 33.2 32.5*    354 414*     Recent Labs     12/20/22  0250 12/21/22  0144 12/22/22 0213   * 131* 130*   K 4.4 3.9 4.3   ANIONGAP 14 13 12    98 97*   CO2 19* 20* 21*   BUN 35* 26* 26*   CREATININE 2.0* 2.0* 1.8*   GLUCOSE 364* 236* 175*   CALCIUM 8.9 8.9 8.8     Recent Labs     12/20/22  0250 12/21/22  0144 12/22/22  0213   MG 1.8 1.5* 1.5*     No results for input(s): AST, ALT, ALB, BILITOT, ALKPHOS, ALB in the last 72 hours. No results for input(s): PH, PO2, PCO2, HCO3, BE, O2SAT in the last 72 hours. No results for input(s): TROPONINI in the last 72 hours. No results for input(s): INR in the last 72 hours. No results for input(s): LACTA in the last 72 hours. Intake/Output Summary (Last 24 hours) at 12/22/2022 1002  Last data filed at 12/22/2022 0931  Gross per 24 hour   Intake 1050 ml   Output 1800 ml   Net -750 ml       No results found.      Assessment and Plan:   MRSA bacteremia  Influenza A infection  Multifocal pneumonia  Rhinovirus infection  DKA  PETROS  Tobacco dependence     ID following  Daptomycin  Follow repeat blood cultures  TTE 12/19/2022 without mention of endocarditis  Completed course of oseltamavir  Admits to noncompliance with insulin counseled  DKA resolved  Insulin gtt since transitioned to sc insulin  HbA1c 12.3 poor control counseled  Unsure of baseline creatinine  Creatinine improved overall  Counseled on cessation of tobacco  Lovenox for DVT prophylaxis  Discussed treatment plan with patient/RN/ID    Geneva Melendrez MD   12/22/2022 10:02 AM

## 2022-12-22 NOTE — CARE COORDINATION
12/20/22 1730   Service Assessment   Patient Orientation Alert and Oriented   Cognition Alert   History Provided By Patient;Medical Record   Primary Caregiver Self   Support Systems Family Members   Patient's Healthcare Decision Maker is: Legal Next of Jina 67   PCP Verified by CM Yes   Last Visit to PCP Within last two years   Prior Functional Level Independent in ADLs/IADLs   Current Functional Level Independent in ADLs/IADLs   Can patient return to prior living arrangement Yes   Ability to make needs known: Good   Family able to assist with home care needs: Yes   Financial Resources Medicaid   [address] [facility] History   Lives With Family  (sister and his niece)   Type of [address] One level   [address] to enter with rails   Bathroom Shower/Tub Tub/Shower unit   [address] None   Receives Help From Family;Friend(s)   ADL Assistance Independent   Homemaking Assistance Independent   Ambulation Assistance Independent   Transfer Assistance Independent   Active  Yes   Occupation Unemployed   Type of Occupation working on getting his disability   Discharge Planning   Type of [place] Family Members   Current Services Prior To Admission None   Potential [address] Medications No   Patient expects to be discharged to: [address]   History of falls?  0   Services At/After Discharge   Services At/After Discharge Community Resources   Mode of Transport at Discharge Other (see comment)  (family)   Late entry  Electronically signed by EDMUNDO Sandoval on 12/21/2022 at 6:18 PM

## 2022-12-22 NOTE — PROGRESS NOTES
Infectious Diseases Progress Note    Patient:  Ignacia Patient  YOB: 1980  MRN: [id number]   Admit date: 12/16/2022   Admitting Physician: Wallace Mcnamara MD  Primary Care Physician: MARÍA Flores NP    Chief Complaint/Interval History: He feels okay. No new symptoms. Less neck and upper back pain. No neurological symptoms. He cannot get PICC line until Monday. Note initiated when I saw him on December 22. Note completed as a late entry on December 23, 2022.     In/Out    Intake/Output Summary (Last 24 hours) at 12/22/2022 1350  Last data filed at 12/22/2022 1231  Gross per 24 hour   Intake 810 ml   Output 2125 ml   Net -1315 ml     Allergies: No Known Allergies  Current Meds: magnesium sulfate 2000 mg in 50 mL IVPB premix, Once  pantoprazole (PROTONIX) tablet 40 mg, BID AC  DAPTOmycin (CUBICIN) 500 mg in sodium chloride 0.9 % 50 mL IVPB, Q24H  insulin glargine (LANTUS) injection vial 22 Units, Nightly  insulin lispro (HUMALOG) injection vial 4 Units, TID WC  guaiFENesin (MUCINEX) extended release tablet 600 mg, BID  benzonatate (TESSALON) capsule 100 mg, TID PRN  enoxaparin (LOVENOX) injection 40 mg, Daily  insulin lispro (HUMALOG) injection vial 0-4 Units, TID WC  insulin lispro (HUMALOG) injection vial 0-4 Units, Nightly  HYDROcodone-acetaminophen (NORCO) 5-325 MG per tablet 1 tablet, Q6H PRN  potassium chloride (KLOR-CON M) extended release tablet 40 mEq, PRN   Or  potassium bicarb-citric acid (EFFER-K) effervescent tablet 40 mEq, PRN   Or  potassium chloride 10 mEq/100 mL IVPB (Peripheral Line), PRN  naloxegol (MOVANTIK) tablet 12.5 mg, QAM AC  ondansetron (ZOFRAN) injection 4 mg, Q6H PRN  glucose chewable tablet 16 g, PRN  dextrose bolus 10% 125 mL, PRN   Or  dextrose bolus 10% 250 mL, PRN  glucagon (rDNA) injection 1 mg, PRN  dextrose 10 % infusion, Continuous PRN  dextrose bolus 10% 125 mL, PRN   Or  dextrose bolus 10% 250 mL, PRN  magnesium sulfate 1000 mg in dextrose 5% 100 mL IVPB, PRN  sodium phosphate 10 mmol in sodium chloride 0.9 % 250 mL IVPB, PRN   Or  sodium phosphate 15 mmol in dextrose 5 % 250 mL IVPB, PRN   Or  sodium phosphate 20 mmol in dextrose 5 % 500 mL IVPB, PRN  polyethylene glycol (GLYCOLAX) packet 17 g, Daily PRN  nicotine (NICODERM CQ) 7 MG/24HR 1 patch, Daily      Review of Systems see HPI    VitalSigns:  BP (!) 137/92   Pulse 98   Temp 97.9 °F (36.6 °C) (Temporal)   Resp 18   Ht 6' (1.829 m)   Wt 137 lb 7 oz (62.3 kg)   SpO2 95%   BMI 18.64 kg/m²      Physical Exam  Line/IV site: No erythema, warmth, induration, or tenderness.   Bilateral lower extremity strength and sensation intact  Bilateral  normal and intact  Heart without murmur  Abdomen soft and nontender    Lab Results:  CBC:   Recent Labs     12/20/22  0250 12/21/22  0144 12/22/22  0213   WBC 16.7* 14.2* 14.1*   HGB 8.9* 9.7* 9.6*    354 414*     BMP:  Recent Labs     12/20/22  0250 12/21/22  0144 12/22/22  0213   * 131* 130*   K 4.4 3.9 4.3    98 97*   CO2 19* 20* 21*   BUN 35* 26* 26*   CREATININE 2.0* 2.0* 1.8*   GLUCOSE 364* 236* 175*     CultureResults:  Blood cultures December 17, 2022-MRSA  Blood cultures December 19, 2022-MRSA  Blood cultures December 21, 2022-no growth to date    Radiology: None    Additional Studies Reviewed:  None    Impression:  MRSA bacteremia  Diabetes mellitus  Upper thoracic and cervical neck pain-MRIs negative  Chronic renal insufficiency-stable    Recommendations:  Continue daptomycin  PICC line on Monday  Hopefully home on home IV antibiotic treatment following PICC line placement    Ayanna Low MD

## 2022-12-23 ENCOUNTER — APPOINTMENT (OUTPATIENT)
Dept: CT IMAGING | Age: 42
DRG: 637 | End: 2022-12-23
Payer: COMMERCIAL

## 2022-12-23 LAB
ANION GAP SERPL CALCULATED.3IONS-SCNC: 12 MMOL/L (ref 7–19)
ATYPICAL LYMPHOCYTE RELATIVE PERCENT: 2 % (ref 0–8)
BANDED NEUTROPHILS RELATIVE PERCENT: 2 % (ref 0–5)
BASOPHILS ABSOLUTE: 0 K/UL (ref 0–0.2)
BASOPHILS RELATIVE PERCENT: 0 % (ref 0–1)
BUN BLDV-MCNC: 28 MG/DL (ref 6–20)
CALCIUM SERPL-MCNC: 8.6 MG/DL (ref 8.6–10)
CHLORIDE BLD-SCNC: 98 MMOL/L (ref 98–111)
CO2: 23 MMOL/L (ref 22–29)
CREAT SERPL-MCNC: 1.9 MG/DL (ref 0.5–1.2)
CULTURE, BLOOD 2: ABNORMAL
CULTURE, BLOOD 2: ABNORMAL
EOSINOPHILS ABSOLUTE: 0 K/UL (ref 0–0.6)
EOSINOPHILS RELATIVE PERCENT: 0 % (ref 0–5)
GFR SERPL CREATININE-BSD FRML MDRD: 44 ML/MIN/{1.73_M2}
GLUCOSE BLD-MCNC: 204 MG/DL (ref 74–109)
GLUCOSE BLD-MCNC: 275 MG/DL (ref 70–99)
GLUCOSE BLD-MCNC: 397 MG/DL (ref 70–99)
GLUCOSE BLD-MCNC: 413 MG/DL (ref 70–99)
GLUCOSE BLD-MCNC: 497 MG/DL (ref 70–99)
HCT VFR BLD CALC: 25.9 % (ref 42–52)
HEMOGLOBIN: 8.5 G/DL (ref 14–18)
HYPOCHROMIA: ABNORMAL
IMMATURE GRANULOCYTES #: 1 K/UL
LYMPHOCYTES ABSOLUTE: 1.1 K/UL (ref 1.1–4.5)
LYMPHOCYTES RELATIVE PERCENT: 5 % (ref 20–40)
MAGNESIUM: 1.8 MG/DL (ref 1.6–2.6)
MCH RBC QN AUTO: 29.7 PG (ref 27–31)
MCHC RBC AUTO-ENTMCNC: 32.8 G/DL (ref 33–37)
MCV RBC AUTO: 90.6 FL (ref 80–94)
METAMYELOCYTES RELATIVE PERCENT: 1 %
MONOCYTES ABSOLUTE: 0.3 K/UL (ref 0–0.9)
MONOCYTES RELATIVE PERCENT: 2 % (ref 0–10)
NEUTROPHILS ABSOLUTE: 14.1 K/UL (ref 1.5–7.5)
NEUTROPHILS RELATIVE PERCENT: 88 % (ref 50–65)
ORGANISM: ABNORMAL
PDW BLD-RTO: 12.8 % (ref 11.5–14.5)
PERFORMED ON: ABNORMAL
PLATELET # BLD: 462 K/UL (ref 130–400)
PLATELET SLIDE REVIEW: ABNORMAL
PMV BLD AUTO: 10.1 FL (ref 9.4–12.4)
POTASSIUM SERPL-SCNC: 4.4 MMOL/L (ref 3.5–5)
RBC # BLD: 2.86 M/UL (ref 4.7–6.1)
SODIUM BLD-SCNC: 133 MMOL/L (ref 136–145)
STOMATOCYTES: ABNORMAL
VANCOMYCIN TROUGH: 6.2 UG/ML (ref 10–20)
WBC # BLD: 15.5 K/UL (ref 4.8–10.8)

## 2022-12-23 PROCEDURE — 36415 COLL VENOUS BLD VENIPUNCTURE: CPT

## 2022-12-23 PROCEDURE — 71250 CT THORAX DX C-: CPT

## 2022-12-23 PROCEDURE — 1210000000 HC MED SURG R&B

## 2022-12-23 PROCEDURE — 6370000000 HC RX 637 (ALT 250 FOR IP): Performed by: HOSPITALIST

## 2022-12-23 PROCEDURE — 6360000002 HC RX W HCPCS: Performed by: HOSPITALIST

## 2022-12-23 PROCEDURE — 82947 ASSAY GLUCOSE BLOOD QUANT: CPT

## 2022-12-23 PROCEDURE — 80202 ASSAY OF VANCOMYCIN: CPT

## 2022-12-23 PROCEDURE — 6370000000 HC RX 637 (ALT 250 FOR IP): Performed by: INTERNAL MEDICINE

## 2022-12-23 PROCEDURE — 80048 BASIC METABOLIC PNL TOTAL CA: CPT

## 2022-12-23 PROCEDURE — 94760 N-INVAS EAR/PLS OXIMETRY 1: CPT

## 2022-12-23 PROCEDURE — 85025 COMPLETE CBC W/AUTO DIFF WBC: CPT

## 2022-12-23 PROCEDURE — 83735 ASSAY OF MAGNESIUM: CPT

## 2022-12-23 RX ADMIN — HYDROCODONE BITARTRATE AND ACETAMINOPHEN 1 TABLET: 5; 325 TABLET ORAL at 18:03

## 2022-12-23 RX ADMIN — INSULIN LISPRO 4 UNITS: 100 INJECTION, SOLUTION INTRAVENOUS; SUBCUTANEOUS at 17:42

## 2022-12-23 RX ADMIN — BENZONATATE 100 MG: 100 CAPSULE ORAL at 05:40

## 2022-12-23 RX ADMIN — INSULIN LISPRO 4 UNITS: 100 INJECTION, SOLUTION INTRAVENOUS; SUBCUTANEOUS at 12:23

## 2022-12-23 RX ADMIN — INSULIN LISPRO 4 UNITS: 100 INJECTION, SOLUTION INTRAVENOUS; SUBCUTANEOUS at 08:37

## 2022-12-23 RX ADMIN — HYDROCODONE BITARTRATE AND ACETAMINOPHEN 1 TABLET: 5; 325 TABLET ORAL at 12:08

## 2022-12-23 RX ADMIN — ENOXAPARIN SODIUM 40 MG: 100 INJECTION SUBCUTANEOUS at 08:37

## 2022-12-23 RX ADMIN — INSULIN LISPRO 2 UNITS: 100 INJECTION, SOLUTION INTRAVENOUS; SUBCUTANEOUS at 08:36

## 2022-12-23 RX ADMIN — GUAIFENESIN 600 MG: 600 TABLET ORAL at 08:38

## 2022-12-23 RX ADMIN — NALOXEGOL OXALATE 12.5 MG: 12.5 TABLET, FILM COATED ORAL at 05:36

## 2022-12-23 RX ADMIN — PANTOPRAZOLE SODIUM 40 MG: 40 TABLET, DELAYED RELEASE ORAL at 17:42

## 2022-12-23 RX ADMIN — INSULIN GLARGINE 22 UNITS: 100 INJECTION, SOLUTION SUBCUTANEOUS at 21:45

## 2022-12-23 RX ADMIN — HYDROCODONE BITARTRATE AND ACETAMINOPHEN 1 TABLET: 5; 325 TABLET ORAL at 05:41

## 2022-12-23 RX ADMIN — PANTOPRAZOLE SODIUM 40 MG: 40 TABLET, DELAYED RELEASE ORAL at 05:36

## 2022-12-23 RX ADMIN — INSULIN LISPRO 4 UNITS: 100 INJECTION, SOLUTION INTRAVENOUS; SUBCUTANEOUS at 12:22

## 2022-12-23 RX ADMIN — INSULIN LISPRO 4 UNITS: 100 INJECTION, SOLUTION INTRAVENOUS; SUBCUTANEOUS at 17:44

## 2022-12-23 RX ADMIN — INSULIN LISPRO 4 UNITS: 100 INJECTION, SOLUTION INTRAVENOUS; SUBCUTANEOUS at 21:45

## 2022-12-23 RX ADMIN — GUAIFENESIN 600 MG: 600 TABLET ORAL at 21:45

## 2022-12-23 ASSESSMENT — PAIN DESCRIPTION - LOCATION
LOCATION: BACK
LOCATION: BACK;NECK

## 2022-12-23 ASSESSMENT — PAIN SCALES - GENERAL
PAINLEVEL_OUTOF10: 7
PAINLEVEL_OUTOF10: 8

## 2022-12-23 ASSESSMENT — PAIN - FUNCTIONAL ASSESSMENT
PAIN_FUNCTIONAL_ASSESSMENT: ACTIVITIES ARE NOT PREVENTED

## 2022-12-23 ASSESSMENT — PAIN DESCRIPTION - DESCRIPTORS
DESCRIPTORS: ACHING
DESCRIPTORS: ACHING;DULL

## 2022-12-23 ASSESSMENT — PAIN DESCRIPTION - ORIENTATION
ORIENTATION: LOWER
ORIENTATION: RIGHT
ORIENTATION: LOWER

## 2022-12-23 NOTE — PLAN OF CARE
Problem: Discharge Planning  Goal: Discharge to home or other facility with appropriate resources  12/23/2022 1423 by Isabella Spangler RN  Outcome: Progressing  12/23/2022 0309 by Rosario Low RN  Outcome: Progressing

## 2022-12-23 NOTE — PROGRESS NOTES
Hospitalist Progress Note  [facility]     Patient: Denise Bangura  : 1980  MRN: [id number]  Code Status: Full Code    Hospital Day: 7   Date of Service: 2022    Subjective:   Patient seen and examined. No current complaints. Past Medical History:   Diagnosis Date    Chronic kidney disease     COVID-19     Hypertension     Type 1 diabetes ([address]        Past Surgical History:   Procedure Laterality Date    ADENOIDECTOMY         History reviewed. No pertinent family history.     Social History     Socioeconomic History    Marital status: Single     Spouse name: Not on file    Number of children: Not on file    Years of education: Not on file    Highest education level: Not on file   Occupational History    Not on file   Tobacco Use    Smoking status: Every Day     Packs/day: 0.50     Types: Cigarettes    Smokeless tobacco: Never   Vaping Use    Vaping Use: Every day   Substance and Sexual Activity    Alcohol use: Not Currently    Drug use: Not Currently    Sexual activity: Not on file   Other Topics Concern    Not on file   Social History Narrative    Not on file     Social Determinants of Health     Financial Resource Strain: Not on file   Food Insecurity: Not on file   Transportation Needs: Not on file   Physical Activity: Not on file   Stress: Not on file   Social Connections: Not on file   Intimate Partner Violence: Not on file   Housing Stability: Not on file       Current Facility-Administered Medications   Medication Dose Route Frequency Provider Last Rate Last Admin    pantoprazole (PROTONIX) tablet 40 mg  40 mg Oral BID AC Eliud Reyes MD   40 mg at 22 0536    DAPTOmycin (CUBICIN) 500 mg in sodium chloride 0.9 % 50 mL IVPB  8 mg/kg IntraVENous Q24H Fidelia Black MD   Stopped at 22 0039    insulin glargine (LANTUS) injection vial 22 Units  22 Units SubCUTAneous Nightly Cole Malin MD   22 Units at 22    insulin lispro (HUMALOG) injection vial 4 Units  4 Units SubCUTAneous TID DESIREE Hart MD   4 Units at 12/23/22 4582    guaiFENesin [facility]) extended release tablet 600 mg  600 mg Oral BID Camila Hart MD   600 mg at 12/23/22 4518    benzonatate (TESSALON) capsule 100 mg  100 mg Oral TID PRN Camila Hart MD   100 mg at 12/23/22 0540    enoxaparin (LOVENOX) injection 40 mg  40 mg SubCUTAneous Daily Amalia Hodges MD   40 mg at 12/23/22 0837    insulin lispro (HUMALOG) injection vial 0-4 Units  0-4 Units SubCUTAneous TID WC Camila Hart MD   2 Units at 12/23/22 2735    insulin lispro (HUMALOG) injection vial 0-4 Units  0-4 Units SubCUTAneous Nightly Camila Hart MD   4 Units at 12/22/22 2054    HYDROcodone-acetaminophen (Presley Keiry) 5-325 MG per tablet 1 tablet  1 tablet Oral Q6H PRN Camila Hart MD   1 tablet at 12/23/22 0541    potassium chloride (KLOR-CON M) extended release tablet 40 mEq  40 mEq Oral PRN Camila Hart MD        Or    potassium bicarb-citric acid (EFFER-K) effervescent tablet 40 mEq  40 mEq Oral PRN Camila Hart MD        Or    potassium chloride 10 mEq/100 mL IVPB (Peripheral Line)  10 mEq IntraVENous PRN Camila Hart MD        naloxegol (MOVANTIK) tablet 12.5 mg  12.5 mg Oral QAM AC Amalia Hodges MD   12.5 mg at 12/23/22 0536    [facility]ansetron [facility]) injection 4 mg  4 mg IntraVENous Q6H PRN Amalia Hodges MD   4 mg at 12/17/22 2056    glucose chewable tablet 16 g  4 tablet Oral PRN Chelle Newman MD        dextrose bolus 10% 125 mL  125 mL IntraVENous PRN Chelle Newman MD        Or    dextrose bolus 10% 250 mL  250 mL IntraVENous PRN Chelle Newman MD        glucagon (rDNA) injection 1 mg  1 mg SubCUTAneous PRN Chelle Newman MD        dextrose 10 % infusion   IntraVENous Continuous PRN Chelle Newman MD        dextrose bolus 10% 125 mL  125 mL IntraVENous PRN Amalia Hodges MD        Or    dextrose bolus 10% 250 mL  250 mL IntraVENous PRN Theodore Juarez Fortino Ornelas MD        magnesium sulfate 1000 mg in dextrose 5% 100 mL IVPB  1,000 mg IntraVENous PRN Carlos Ruffin MD   Stopped at 12/18/22 2022    sodium phosphate 10 mmol in sodium chloride 0.9 % 250 mL IVPB  10 mmol IntraVENous PRN Carlos Ruffin MD        Or    sodium phosphate 15 mmol in dextrose 5 % 250 mL IVPB  15 mmol IntraVENous PRN Carlos Ruffin MD   Stopped at 12/18/22 2108    Or    sodium phosphate 20 mmol in dextrose 5 % 500 mL IVPB  20 mmol IntraVENous PRN Carlos Ruffin MD        polyethylene glycol (GLYCOLAX) packet 17 g  17 g Oral Daily PRN Carlos Ruffin MD        nicotine (NICODERM CQ) 7 MG/24HR 1 patch  1 patch TransDERmal Daily Carlos Ruffin MD   1 patch at 12/22/22 0816         dextrose          Objective:   /70   Pulse (!) 115   Temp 98.8 °F (37.1 °C)   Resp 20   Ht 6' (1.829 m)   Wt 139 lb 1.6 oz (63.1 kg)   SpO2 96%   BMI 18.87 kg/m²     General: no acute distress  HEENT: normocephalic  Neck: supple, symmetrical, trachea midline   Lungs: clear to auscultation bilaterally  Cardiovascular: s1 and s2 normal  Abdomen: soft, positive bowel sounds, nondistended, nontender  Extremities: no edema or cyanosis   Neuro: aaox3, no focal deficits    Recent Labs     12/21/22 0144 12/22/22 0213 12/23/22 0311   WBC 14.2* 14.1* 15.5*   RBC 3.27* 3.26* 2.86*   HGB 9.7* 9.6* 8.5*   HCT 29.2* 29.5* 25.9*   MCV 89.3 90.5 90.6   MCH 29.7 29.4 29.7   MCHC 33.2 32.5* 32.8*    414* 462*     Recent Labs     12/21/22  0144 12/22/22  0213 12/23/22  0311   * 130* 133*   K 3.9 4.3 4.4   ANIONGAP 13 12 12   CL 98 97* 98   CO2 20* 21* 23   BUN 26* 26* 28*   CREATININE 2.0* 1.8* 1.9*   GLUCOSE 236* 175* 204*   CALCIUM 8.9 8.8 8.6     Recent Labs     12/21/22  0144 12/22/22  0213 12/23/22  0311   MG 1.5* 1.5* 1.8     No results for input(s): AST, ALT, ALB, BILITOT, ALKPHOS, ALB in the last 72 hours.   No results for input(s): PH, PO2, PCO2, HCO3, BE, O2SAT in the last 72 hours. No results for input(s): TROPONINI in the last 72 hours. No results for input(s): INR in the last 72 hours. No results for input(s): LACTA in the last 72 hours. Intake/Output Summary (Last 24 hours) at 12/23/2022 1154  Last data filed at 12/23/2022 1122  Gross per 24 hour   Intake 480 ml   Output 3375 ml   Net -2895 ml       No results found.      Assessment and Plan:   MRSA bacteremia  Influenza A infection  Multifocal pneumonia  Rhinovirus infection  DKA  PETROS  Tobacco dependence     ID following  Daptomycin  Follow repeat blood cultures  TTE 12/19/2022 without mention of endocarditis  Plans for PICC line for long-term antibiotic therapy  Completed course of oseltamavir  Admits to noncompliance with insulin counseled  DKA resolved  Insulin gtt since transitioned to sc insulin  HbA1c 12.3 poor control counseled  Creatinine appears to be at baseline  Counseled on cessation of tobacco  Lovenox for DVT prophylaxis  Discussed treatment plan with patient/RN/ID    Marianne Barthel, MD   12/23/2022 11:54 AM

## 2022-12-24 LAB
ANION GAP SERPL CALCULATED.3IONS-SCNC: 13 MMOL/L (ref 7–19)
BASOPHILS ABSOLUTE: 0.1 K/UL (ref 0–0.2)
BASOPHILS RELATIVE PERCENT: 0.7 % (ref 0–1)
BUN BLDV-MCNC: 29 MG/DL (ref 6–20)
CALCIUM SERPL-MCNC: 8.6 MG/DL (ref 8.6–10)
CHLORIDE BLD-SCNC: 94 MMOL/L (ref 98–111)
CO2: 23 MMOL/L (ref 22–29)
CREAT SERPL-MCNC: 1.8 MG/DL (ref 0.5–1.2)
EOSINOPHILS ABSOLUTE: 0 K/UL (ref 0–0.6)
EOSINOPHILS RELATIVE PERCENT: 0.2 % (ref 0–5)
GFR SERPL CREATININE-BSD FRML MDRD: 47 ML/MIN/{1.73_M2}
GLUCOSE BLD-MCNC: 249 MG/DL (ref 70–99)
GLUCOSE BLD-MCNC: 273 MG/DL (ref 70–99)
GLUCOSE BLD-MCNC: 306 MG/DL (ref 70–99)
GLUCOSE BLD-MCNC: 322 MG/DL (ref 74–109)
GLUCOSE BLD-MCNC: 378 MG/DL (ref 70–99)
HCT VFR BLD CALC: 29.6 % (ref 42–52)
HEMOGLOBIN: 8.7 G/DL (ref 14–18)
IMMATURE GRANULOCYTES #: 0.9 K/UL
LYMPHOCYTES ABSOLUTE: 1.5 K/UL (ref 1.1–4.5)
LYMPHOCYTES RELATIVE PERCENT: 9.3 % (ref 20–40)
MAGNESIUM: 1.8 MG/DL (ref 1.6–2.6)
MCH RBC QN AUTO: 30 PG (ref 27–31)
MCHC RBC AUTO-ENTMCNC: 29.4 G/DL (ref 33–37)
MCV RBC AUTO: 102.1 FL (ref 80–94)
MONOCYTES ABSOLUTE: 1.3 K/UL (ref 0–0.9)
MONOCYTES RELATIVE PERCENT: 8.2 % (ref 0–10)
NEUTROPHILS ABSOLUTE: 12.3 K/UL (ref 1.5–7.5)
NEUTROPHILS RELATIVE PERCENT: 76 % (ref 50–65)
PDW BLD-RTO: 12.9 % (ref 11.5–14.5)
PERFORMED ON: ABNORMAL
PLATELET # BLD: 539 K/UL (ref 130–400)
PMV BLD AUTO: 9.9 FL (ref 9.4–12.4)
POTASSIUM SERPL-SCNC: 4.9 MMOL/L (ref 3.5–5)
RBC # BLD: 2.9 M/UL (ref 4.7–6.1)
SODIUM BLD-SCNC: 130 MMOL/L (ref 136–145)
WBC # BLD: 16.2 K/UL (ref 4.8–10.8)

## 2022-12-24 PROCEDURE — 6370000000 HC RX 637 (ALT 250 FOR IP): Performed by: INTERNAL MEDICINE

## 2022-12-24 PROCEDURE — 1210000000 HC MED SURG R&B

## 2022-12-24 PROCEDURE — 85025 COMPLETE CBC W/AUTO DIFF WBC: CPT

## 2022-12-24 PROCEDURE — 36415 COLL VENOUS BLD VENIPUNCTURE: CPT

## 2022-12-24 PROCEDURE — 6370000000 HC RX 637 (ALT 250 FOR IP): Performed by: HOSPITALIST

## 2022-12-24 PROCEDURE — 83735 ASSAY OF MAGNESIUM: CPT

## 2022-12-24 PROCEDURE — 80048 BASIC METABOLIC PNL TOTAL CA: CPT

## 2022-12-24 PROCEDURE — 6360000002 HC RX W HCPCS: Performed by: INTERNAL MEDICINE

## 2022-12-24 PROCEDURE — 2580000003 HC RX 258: Performed by: INTERNAL MEDICINE

## 2022-12-24 PROCEDURE — 82947 ASSAY GLUCOSE BLOOD QUANT: CPT

## 2022-12-24 PROCEDURE — 6360000002 HC RX W HCPCS: Performed by: HOSPITALIST

## 2022-12-24 RX ORDER — INSULIN GLARGINE 100 [IU]/ML
25 INJECTION, SOLUTION SUBCUTANEOUS NIGHTLY
Status: DISCONTINUED | OUTPATIENT
Start: 2022-12-24 | End: 2022-12-26 | Stop reason: HOSPADM

## 2022-12-24 RX ORDER — INSULIN LISPRO 100 [IU]/ML
6 INJECTION, SOLUTION INTRAVENOUS; SUBCUTANEOUS
Status: DISCONTINUED | OUTPATIENT
Start: 2022-12-24 | End: 2022-12-26 | Stop reason: HOSPADM

## 2022-12-24 RX ADMIN — INSULIN GLARGINE 25 UNITS: 100 INJECTION, SOLUTION SUBCUTANEOUS at 20:52

## 2022-12-24 RX ADMIN — HYDROCODONE BITARTRATE AND ACETAMINOPHEN 1 TABLET: 5; 325 TABLET ORAL at 06:38

## 2022-12-24 RX ADMIN — GUAIFENESIN 600 MG: 600 TABLET ORAL at 20:52

## 2022-12-24 RX ADMIN — INSULIN LISPRO 4 UNITS: 100 INJECTION, SOLUTION INTRAVENOUS; SUBCUTANEOUS at 17:34

## 2022-12-24 RX ADMIN — GUAIFENESIN 600 MG: 600 TABLET ORAL at 08:46

## 2022-12-24 RX ADMIN — BENZONATATE 100 MG: 100 CAPSULE ORAL at 15:48

## 2022-12-24 RX ADMIN — PANTOPRAZOLE SODIUM 40 MG: 40 TABLET, DELAYED RELEASE ORAL at 17:55

## 2022-12-24 RX ADMIN — NALOXEGOL OXALATE 12.5 MG: 12.5 TABLET, FILM COATED ORAL at 06:38

## 2022-12-24 RX ADMIN — Medication 1000 MG: at 17:58

## 2022-12-24 RX ADMIN — ENOXAPARIN SODIUM 40 MG: 100 INJECTION SUBCUTANEOUS at 08:46

## 2022-12-24 RX ADMIN — VANCOMYCIN HYDROCHLORIDE 1500 MG: 5 INJECTION, POWDER, LYOPHILIZED, FOR SOLUTION INTRAVENOUS at 01:14

## 2022-12-24 RX ADMIN — HYDROCODONE BITARTRATE AND ACETAMINOPHEN 1 TABLET: 5; 325 TABLET ORAL at 00:15

## 2022-12-24 RX ADMIN — INSULIN LISPRO 6 UNITS: 100 INJECTION, SOLUTION INTRAVENOUS; SUBCUTANEOUS at 17:33

## 2022-12-24 RX ADMIN — INSULIN LISPRO 2 UNITS: 100 INJECTION, SOLUTION INTRAVENOUS; SUBCUTANEOUS at 08:47

## 2022-12-24 RX ADMIN — PANTOPRAZOLE SODIUM 40 MG: 40 TABLET, DELAYED RELEASE ORAL at 06:38

## 2022-12-24 RX ADMIN — INSULIN LISPRO 6 UNITS: 100 INJECTION, SOLUTION INTRAVENOUS; SUBCUTANEOUS at 12:55

## 2022-12-24 RX ADMIN — INSULIN LISPRO 4 UNITS: 100 INJECTION, SOLUTION INTRAVENOUS; SUBCUTANEOUS at 20:52

## 2022-12-24 RX ADMIN — HYDROCODONE BITARTRATE AND ACETAMINOPHEN 1 TABLET: 5; 325 TABLET ORAL at 12:54

## 2022-12-24 RX ADMIN — HYDROCODONE BITARTRATE AND ACETAMINOPHEN 1 TABLET: 5; 325 TABLET ORAL at 18:56

## 2022-12-24 RX ADMIN — INSULIN LISPRO 1 UNITS: 100 INJECTION, SOLUTION INTRAVENOUS; SUBCUTANEOUS at 12:54

## 2022-12-24 RX ADMIN — INSULIN LISPRO 6 UNITS: 100 INJECTION, SOLUTION INTRAVENOUS; SUBCUTANEOUS at 08:46

## 2022-12-24 ASSESSMENT — PAIN DESCRIPTION - LOCATION
LOCATION: BACK
LOCATION: BACK

## 2022-12-24 ASSESSMENT — PAIN SCALES - GENERAL
PAINLEVEL_OUTOF10: 8
PAINLEVEL_OUTOF10: 7

## 2022-12-24 ASSESSMENT — PAIN DESCRIPTION - ORIENTATION
ORIENTATION: LOWER
ORIENTATION: LOWER

## 2022-12-24 ASSESSMENT — PAIN - FUNCTIONAL ASSESSMENT: PAIN_FUNCTIONAL_ASSESSMENT: ACTIVITIES ARE NOT PREVENTED

## 2022-12-24 NOTE — PROGRESS NOTES
Infectious Diseases Progress Note    Patient:  Nury Shukla  YOB: 1980  MRN: [id number]   Admit date: 12/16/2022   Admitting Physician: Madina Patel MD  Primary Care Physician: MARÍA San NP    Chief Complaint/Interval History: He has some dry cough. No productive cough. No hemoptysis. He had had severe COVID earlier this year with very prolonged hospitalization. Much of his care when he indicates was in [place]. He indicates they told him his lungs were in pretty rough shape. I have been treating with no new localizing symptoms. He remains on room air.     In/Out    Intake/Output Summary (Last 24 hours) at 12/23/2022 1903  Last data filed at 12/23/2022 1223  Gross per 24 hour   Intake 240 ml   Output 2925 ml   Net -2685 ml     Allergies: No Known Allergies  Current Meds: pantoprazole (PROTONIX) tablet 40 mg, BID AC  DAPTOmycin (CUBICIN) 500 mg in sodium chloride 0.9 % 50 mL IVPB, Q24H  insulin glargine (LANTUS) injection vial 22 Units, Nightly  insulin lispro (HUMALOG) injection vial 4 Units, TID WC  guaiFENesin (MUCINEX) extended release tablet 600 mg, BID  benzonatate (TESSALON) capsule 100 mg, TID PRN  enoxaparin (LOVENOX) injection 40 mg, Daily  insulin lispro (HUMALOG) injection vial 0-4 Units, TID WC  insulin lispro (HUMALOG) injection vial 0-4 Units, Nightly  HYDROcodone-acetaminophen (NORCO) 5-325 MG per tablet 1 tablet, Q6H PRN  potassium chloride (KLOR-CON M) extended release tablet 40 mEq, PRN   Or  potassium bicarb-citric acid (EFFER-K) effervescent tablet 40 mEq, PRN   Or  potassium chloride 10 mEq/100 mL IVPB (Peripheral Line), PRN  naloxegol (MOVANTIK) tablet 12.5 mg, QAM AC  ondansetron (ZOFRAN) injection 4 mg, Q6H PRN  glucose chewable tablet 16 g, PRN  dextrose bolus 10% 125 mL, PRN   Or  dextrose bolus 10% 250 mL, PRN  glucagon (rDNA) injection 1 mg, PRN  dextrose 10 % infusion, Continuous PRN  dextrose bolus 10% 125 mL, PRN   Or  dextrose bolus 10% 250 mL, PRN  magnesium sulfate 1000 mg in dextrose 5% 100 mL IVPB, PRN  sodium phosphate 10 mmol in sodium chloride 0.9 % 250 mL IVPB, PRN   Or  sodium phosphate 15 mmol in dextrose 5 % 250 mL IVPB, PRN   Or  sodium phosphate 20 mmol in dextrose 5 % 500 mL IVPB, PRN  polyethylene glycol (GLYCOLAX) packet 17 g, Daily PRN  nicotine (NICODERM CQ) 7 MG/24HR 1 patch, Daily      Review of Systems see HPI    VitalSigns:  BP (!) 143/102   Pulse (!) 108   Temp 98.4 °F (36.9 °C)   Resp 18   Ht 6' (1.829 m)   Wt 139 lb 1.6 oz (63.1 kg)   SpO2 99%   BMI 18.87 kg/m²      Physical Exam  Line/IV site: No erythema, warmth, induration, or tenderness. Lungs with rare coarse crackle  Heart regular rhythm. No apparent systolic or diastolic murmur. Abdomen soft and nontender.   Extremities without edema  Skin without rash    Lab Results:  CBC:   Recent Labs     12/21/22  0144 12/22/22  0213 12/23/22  0311   WBC 14.2* 14.1* 15.5*   HGB 9.7* 9.6* 8.5*    414* 462*     BMP:  Recent Labs     12/21/22  0144 12/22/22  0213 12/23/22  0311   * 130* 133*   K 3.9 4.3 4.4   CL 98 97* 98   CO2 20* 21* 23   BUN 26* 26* 28*   CREATININE 2.0* 1.8* 1.9*   GLUCOSE 236* 175* 204*     CultureResults:  Blood cultures December 17, 2022-no growth  Blood cultures December 17, 2022-MRSA (susceptibility outlined below)  Blood cultures December 19, 2022-MRSA  Blood cultures December 19, 2020-MRSA  Blood cultures December 21, 2022-no growth  Blood cultures December 21, 2022-no growth    Susceptibility    Methicillin-Resistant Staphylococcus aureus (1)    Antibiotic Interpretation Microscan  Method Status    benzylpenicillin Resistant >=0.5 mcg/mL BACTERIAL SUSCEPTIBILITY PANEL BY TYRA     clindamycin Sensitive 0.25 mcg/mL BACTERIAL SUSCEPTIBILITY PANEL BY TYRA     DAPTOmycin Sensitive 0.25 mcg/mL BACTERIAL SUSCEPTIBILITY PANEL BY TYRA     erythromycin Resistant >=8 mcg/mL BACTERIAL SUSCEPTIBILITY PANEL BY TYRA     inducible clindamycin resistance Neg mcg/mL BACTERIAL SUSCEPTIBILITY PANEL BY TYRA     linezolid Sensitive 2 mcg/mL BACTERIAL SUSCEPTIBILITY PANEL BY TYRA     oxacillin Resistant >=4 mcg/mL BACTERIAL SUSCEPTIBILITY PANEL BY TYRA     tetracycline Sensitive <=1 mcg/mL BACTERIAL SUSCEPTIBILITY PANEL BY TYRA     trimethoprim-sulfamethoxazole Sensitive <=10 mcg/mL BACTERIAL SUSCEPTIBILITY PANEL BY TYRA     vancomycin Sensitive 1 mcg/mL BACTERIAL SUSCEPTIBILITY PANEL BY TYRA       Radiology:   CT chest done earlier today without contrast-radiology reading remains pending:  Personally reviewed the CT  Representative cross-section below      MRI thoracic spine:  Impression   Impression: 1. No evidence of fluid collection, discitis or osteomyelitis. 2.Multifocal masslike consolidation involving the lungs, which may represent   multifocal pneumonia or underlying neoplasm. CT of the chest could be performed   for further evaluation as clinically warranted. 3.Abnormal marrow edema involving the posterior left fifth rib near the   costovertebral junction, which is nonspecific and may be related to underlying   trauma. Please correlate with physical examination. MRI cervical spine:     Impression   Impression: 1. Mild degenerative changes of the cervical spine, causing mild spinal canal   stenosis with moderate left and mild to moderate right neural foraminal stenosis   at C3-C4. 2.No evidence of fluid collection, discitis or osteomyelitis. 3.Mild nonspecific edema involving the posterior paraspinal musculature. Additional Studies Reviewed:  2D echocardiogram done December 19, 2022:  Conclusions  Summary  LV is normal in size with borderline normal LV systolic function. LV ejection fraction estimated 50 to 55%. Mild concentric LVH. Diastolic parameters appear within normal range. RV is normal in size with normal systolic function. Left atrium appears normal in size. Right atrium appears normal in size.   Aortic valve is trileaflet with normal leaflet mobility. No significant stenosis or regurgitation. Mitral valve appears structurally normal with normal leaflet mobility. No stenosis or significant regurgitation. No significant tricuspid regurgitation noted. No significant pericardial effusion   Signature  Electronically signed by Heather Poole MD(Interpreting physician) on 12/19/2022 07:55 PM    Impression:  1. MRSA bacteremia-blood cultures appear to have cleared  2. He was admitted with influenza and rhinovirus-resolved  3. History of severe COVID-19 infection earlier this year  4. Abnormal appearing CT of the chest-no radiology report available-on my view has some findings in both left lower lobe, left upper lobe, and right upper lobe area. He has infiltrate. Some of these areas appear to be cavitary. 5.  History of tobacco use-now chewing tobacco  6. Diabetes mellitus  7. Chronic renal insufficiency    Recommendations:  Suspect the majority of findings in his chest are related to his prior severe COVID infection. He may have post influenza staph pneumonia as well.   It would be very helpful once we have helpful if we can locate a CT scan of the chest report from his prior hospitalization in [place]   To see if current CT findings on the chest could be more chronic  Given the pulmonary findings on CT, going to discontinue daptomycin and change back to vancomycin as daptomycin can be rendered less effective by surfactant  Anticipate PICC line placement Monday and try to work with  to make arrangements for home IV antibiotic approval on Monday    Zain Herzog MD

## 2022-12-24 NOTE — PROGRESS NOTES
[facility] Pharmacokinetic Monitoring Service - Vancomycin     Alta Alvarez is a 38 y.o. male starting on vancomycin therapy for bloodstream infection. Pharmacy consulted by Dr. Katey Gilmore for monitoring and adjustment. Target Concentration: Goal AUC/TYRA 400-600 mg*hr/L    Additional Antimicrobials: none    Pertinent Laboratory Values: Wt Readings from Last 1 Encounters:   12/23/22 139 lb 1.6 oz (63.1 kg)     Temp Readings from Last 1 Encounters:   12/23/22 98.4 °F (36.9 °C)     Estimated Creatinine Clearance: 44 mL/min (A) (based on SCr of 1.9 mg/dL (H)). Recent Labs     12/22/22  0213 12/23/22  0311   CREATININE 1.8* 1.9*   WBC 14.1* 15.5*     Procalcitonin: no level    Pertinent Cultures:  Culture Date Source Results   12/17/22 Blood x 1  MRSA   12/17/22 Blood x 1  No growth   12/19/22 Blood x 2  MRSA   12/21/22 Blood x 2  No growth    MRSA Nasal Swab: N/A. Non-respiratory infection. Plan:  Dosing recommendations based on Bayesian software  Patient was on Vancomycin from 12/17-12/21/22, was discontinued, and has been restarted. Level was ordered prior to resuming Vancomycin. Level = 6.2 on 12/23 at 2131  Start vancomycin 1500 mg IV x 1 dose, followed by 1000 mg IV every 24 hours.    Anticipated AUC of 438 and trough concentration of 12.6 at steady state  Renal labs as indicated   Vancomycin concentration ordered for 12/25 @ 1800   Pharmacy will continue to monitor patient and adjust therapy as indicated    Thank you for the consult,  Shameka Guevara, [facility]  12/23/2022 11:47 PM

## 2022-12-24 NOTE — PROGRESS NOTES
Hospitalist Progress Note  [facility]     Patient: Marilynn Miguel  : 1980  MRN: [id number]  Code Status: Full Code    Hospital Day: 8   Date of Service: 2022    Subjective:   Patient seen and examined. No current complaints. Past Medical History:   Diagnosis Date    Chronic kidney disease     COVID-19     Hypertension     Type 1 diabetes ([address]        Past Surgical History:   Procedure Laterality Date    ADENOIDECTOMY         History reviewed. No pertinent family history.     Social History     Socioeconomic History    Marital status: Single     Spouse name: Not on file    Number of children: Not on file    Years of education: Not on file    Highest education level: Not on file   Occupational History    Not on file   Tobacco Use    Smoking status: Every Day     Packs/day: 0.50     Types: Cigarettes    Smokeless tobacco: Never   Vaping Use    Vaping Use: Every day   Substance and Sexual Activity    Alcohol use: Not Currently    Drug use: Not Currently    Sexual activity: Not on file   Other Topics Concern    Not on file   Social History Narrative    Not on file     Social Determinants of Health     Financial Resource Strain: Not on file   Food Insecurity: Not on file   Transportation Needs: Not on file   Physical Activity: Not on file   Stress: Not on file   Social Connections: Not on file   Intimate Partner Violence: Not on file   Housing Stability: Not on file       Current Facility-Administered Medications   Medication Dose Route Frequency Provider Last Rate Last Admin    insulin lispro (HUMALOG) injection vial 6 Units  6 Units SubCUTAneous TID  Marianne Barthel, MD   6 Units at 22 0846    insulin glargine (LANTUS) injection vial 25 Units  25 Units SubCUTAneous Nightly Marianne Barthel, MD        vancomycin Alric Dress) intermittent dosing (placeholder)   Other RX Placeholder Angel Quiroz MD        vancomycin (VANCOCIN) 1000 mg in sodium chloride 0.9% 250 mL IVPB  1,000 mg IntraVENous Q24H Ishaan Alvarez MD        pantoprazole (PROTONIX) tablet 40 mg  40 mg Oral BID AC José Luis An MD   40 mg at 12/24/22 8914    guaiFENesin [facility]) extended release tablet 600 mg  600 mg Oral BID Nigel Kaur MD   600 mg at 12/24/22 0846    benzonatate (TESSALON) capsule 100 mg  100 mg Oral TID PRN Nigel Kaur MD   100 mg at 12/23/22 0540    enoxaparin (LOVENOX) injection 40 mg  40 mg SubCUTAneous Daily José Luis An MD   40 mg at 12/24/22 0846    insulin lispro (HUMALOG) injection vial 0-4 Units  0-4 Units SubCUTAneous TID WC Nigel Kaur MD   2 Units at 12/24/22 0847    insulin lispro (HUMALOG) injection vial 0-4 Units  0-4 Units SubCUTAneous Nightly Nigel Kaur MD   4 Units at 12/23/22 2145    HYDROcodone-acetaminophen (NORCO) 5-325 MG per tablet 1 tablet  1 tablet Oral Q6H PRN Nigel Kaur MD   1 tablet at 12/24/22 8693    potassium chloride (KLOR-CON M) extended release tablet 40 mEq  40 mEq Oral PRN Nigel Kaur MD        Or    potassium bicarb-citric acid (EFFER-K) effervescent tablet 40 mEq  40 mEq Oral PRN Nigel Kaur MD        Or    potassium chloride 10 mEq/100 mL IVPB (Peripheral Line)  10 mEq IntraVENous PRN Nigel Kaur MD        naloxegol (MOVANTIK) tablet 12.5 mg  12.5 mg Oral QAM AC José Luis An MD   12.5 mg at 12/24/22 0638    ondansetron [facility]) injection 4 mg  4 mg IntraVENous Q6H PRN José Luis An MD   4 mg at 12/17/22 2056    glucose chewable tablet 16 g  4 tablet Oral PRDONAL Amezquita MD        dextrose bolus 10% 125 mL  125 mL IntraVENous PRN Ciro Amezquita MD        Or    dextrose bolus 10% 250 mL  250 mL IntraVENous PRN Ciro Amezquita MD        glucagon (rDNA) injection 1 mg  1 mg SubCUTAneous PRDONAL Amezquita MD        dextrose 10 % infusion   IntraVENous Continuous PRN Ciro mAezquita MD        dextrose bolus 10% 125 mL  125 mL IntraVENous PRN José Luis An MD        Or dextrose bolus 10% 250 mL  250 mL IntraVENous PRN Carlos Ruffin MD        magnesium sulfate 1000 mg in dextrose 5% 100 mL IVPB  1,000 mg IntraVENous PRN Carlos Ruffin MD   Stopped at 12/18/22 2022    sodium phosphate 10 mmol in sodium chloride 0.9 % 250 mL IVPB  10 mmol IntraVENous PRN Carlos Ruffin MD        Or    sodium phosphate 15 mmol in dextrose 5 % 250 mL IVPB  15 mmol IntraVENous PRN Carlos Ruffin MD   Stopped at 12/18/22 2108    Or    sodium phosphate 20 mmol in dextrose 5 % 500 mL IVPB  20 mmol IntraVENous PRN Carlos Ruffin MD        polyethylene glycol (GLYCOLAX) packet 17 g  17 g Oral Daily PRN Carlos Ruffin MD        nicotine (NICODERM CQ) 7 MG/24HR 1 patch  1 patch TransDERmal Daily Carlos Ruffin MD   1 patch at 12/22/22 0816         dextrose          Objective:   /80   Pulse (!) 103   Temp 98.2 °F (36.8 °C) (Temporal)   Resp 16   Ht 6' (1.829 m)   Wt 139 lb 1.6 oz (63.1 kg)   SpO2 96%   BMI 18.87 kg/m²     General: no acute distress  HEENT: normocephalic  Neck: supple, symmetrical, trachea midline   Lungs: clear to auscultation bilaterally  Cardiovascular: s1 and s2 normal  Abdomen: soft, positive bowel sounds, nondistended, nontender  Extremities: no edema or cyanosis   Neuro: aaox3, no focal deficits    Recent Labs     12/22/22 0213 12/23/22 0311 12/24/22 0223   WBC 14.1* 15.5* 16.2*   RBC 3.26* 2.86* 2.90*   HGB 9.6* 8.5* 8.7*   HCT 29.5* 25.9* 29.6*   MCV 90.5 90.6 102.1*   MCH 29.4 29.7 30.0   MCHC 32.5* 32.8* 29.4*   * 462* 539*     Recent Labs     12/22/22 0213 12/23/22 0311 12/24/22 0223   * 133* 130*   K 4.3 4.4 4.9   ANIONGAP 12 12 13   CL 97* 98 94*   CO2 21* 23 23   BUN 26* 28* 29*   CREATININE 1.8* 1.9* 1.8*   GLUCOSE 175* 204* 322*   CALCIUM 8.8 8.6 8.6     Recent Labs     12/22/22  0213 12/23/22  0311 12/24/22  0223   MG 1.5* 1.8 1.8     No results for input(s): AST, ALT, ALB, BILITOT, ALKPHOS, ALB in the last 72 hours. No results for input(s): PH, PO2, PCO2, HCO3, BE, O2SAT in the last 72 hours. No results for input(s): TROPONINI in the last 72 hours. No results for input(s): INR in the last 72 hours. No results for input(s): LACTA in the last 72 hours. Intake/Output Summary (Last 24 hours) at 12/24/2022 1127  Last data filed at 12/24/2022 1009  Gross per 24 hour   Intake 240 ml   Output 1550 ml   Net -1310 ml       No results found.      Assessment and Plan:   MRSA bacteremia  Influenza A infection  Multifocal pneumonia  Rhinovirus infection  DKA  PETROS  Tobacco dependence     ID following  Vancomycin  Follow repeat blood cultures  TTE 12/19/2022 without mention of endocarditis  Plans for PICC line for long-term antibiotic therapy  Completed course of oseltamavir  Admits to noncompliance with insulin counseled  DKA resolved  Insulin gtt since transitioned to sc insulin  HbA1c 12.3 poor control counseled  Creatinine appears to be at baseline (1.8-2.0)  Counseled on cessation of tobacco  Lovenox for DVT prophylaxis  Discussed treatment plan with patient/RN/ID     Greer Sandoval MD   12/24/2022 11:27 AM

## 2022-12-24 NOTE — PROGRESS NOTES
Comprehensive Nutrition Assessment    Type and Reason for Visit:  Reassess    Nutrition Recommendations/Plan:   Decrease ONS to 1x/day and start double protein portions     Malnutrition Assessment:  Malnutrition Status:  Severe malnutrition (12/24/22 0954)    Context:  Chronic Illness     Findings of the 6 clinical characteristics of malnutrition:  Energy Intake:  No significant decrease in energy intake  Weight Loss:  Greater than 7.5% over 3 months     Body Fat Loss:  Severe body fat loss Orbital, Buccal region   Muscle Mass Loss:  Mild muscle mass loss Temples (temporalis), Clavicles (pectoralis & deltoids)  Fluid Accumulation:  No significant fluid accumulation Extremities   Strength:  Not Performed    Nutrition Assessment:    PO intake contnues to be good with intake ranging %. Accuchek's still elevated 273-413. Will start double protein portions and decrease Glucerna to 1x/day at dinner. Assess if that may help with glucose control. Weight still lower end of normal range for his age. Nutrition Related Findings:    A1C 12.3 Wound Type: None       Current Nutrition Intake & Therapies:    Average Meal Intake: %  Average Supplements Intake: 26-50%, 51-75%, %  ADULT DIET; Regular; 4 carb choices (60 gm/meal); Safety Tray; Safety Tray (Disposables)  ADULT ORAL NUTRITION SUPPLEMENT; Breakfast, Lunch, Dinner; Diabetic Oral Supplement    Anthropometric Measures:  Height: 6' (182.9 cm)  Ideal Body Weight (IBW): 178 lbs (81 kg)    Admission Body Weight: 140 lb (63.5 kg)  Current Body Weight: 139 lb 1.6 oz (63.1 kg), 78.1 % IBW. Weight Source: Standing Scale  Current BMI (kg/m2): 18.9  Usual Body Weight: 150 lb (68 kg)  % Weight Change (Calculated): -14.9  Weight Adjustment For: No Adjustment  BMI Categories: Normal Weight (BMI 18.5-24. 9)    Estimated Daily Nutrient Needs:  Energy Requirements Based On: Kcal/kg  Weight Used for Energy Requirements: Current  Energy (kcal/day): 4672-3127  Weight Used for Protein Requirements: Current  Protein (g/day): 106  Method Used for Fluid Requirements: 1 ml/kcal  Fluid (ml/day): 8295-0774    Nutrition Diagnosis:   Altered nutrition-related lab values related to acute injury/trauma, endocrine dysfuntion as evidenced by lab values    Nutrition Interventions:   Food and/or Nutrient Delivery: Modify Oral Nutrition Supplement, Modify Current Diet  Nutrition Education/Counseling: No recommendation at this time  Coordination of Nutrition Care: Continue to monitor while inpatient       Goals:  Previous Goal Met: Goal(s) Achieved  Goals: Meet at least 75% of estimated needs, PO intake 75% or greater       Nutrition Monitoring and Evaluation:   Behavioral-Environmental Outcomes: None Identified  Food/Nutrient Intake Outcomes: Food and Nutrient Intake, Supplement Intake  Physical Signs/Symptoms Outcomes: Biochemical Data, Fluid Status or Edema, Weight, Skin    Discharge Planning:    Continue current diet     Alta Blair MS, RD, LD  Contact: [phone]

## 2022-12-25 LAB
GLUCOSE BLD-MCNC: 194 MG/DL (ref 70–99)
GLUCOSE BLD-MCNC: 335 MG/DL (ref 70–99)
GLUCOSE BLD-MCNC: 361 MG/DL (ref 70–99)
GLUCOSE BLD-MCNC: 449 MG/DL (ref 70–99)
PERFORMED ON: ABNORMAL

## 2022-12-25 PROCEDURE — 6370000000 HC RX 637 (ALT 250 FOR IP): Performed by: INTERNAL MEDICINE

## 2022-12-25 PROCEDURE — 94760 N-INVAS EAR/PLS OXIMETRY 1: CPT

## 2022-12-25 PROCEDURE — 6360000002 HC RX W HCPCS: Performed by: INTERNAL MEDICINE

## 2022-12-25 PROCEDURE — 6360000002 HC RX W HCPCS: Performed by: HOSPITALIST

## 2022-12-25 PROCEDURE — 82947 ASSAY GLUCOSE BLOOD QUANT: CPT

## 2022-12-25 PROCEDURE — 6370000000 HC RX 637 (ALT 250 FOR IP): Performed by: HOSPITALIST

## 2022-12-25 PROCEDURE — 2580000003 HC RX 258: Performed by: INTERNAL MEDICINE

## 2022-12-25 PROCEDURE — 1210000000 HC MED SURG R&B

## 2022-12-25 RX ORDER — TEMAZEPAM 15 MG/1
15 CAPSULE ORAL NIGHTLY PRN
Status: DISCONTINUED | OUTPATIENT
Start: 2022-12-25 | End: 2022-12-26 | Stop reason: HOSPADM

## 2022-12-25 RX ADMIN — INSULIN LISPRO 6 UNITS: 100 INJECTION, SOLUTION INTRAVENOUS; SUBCUTANEOUS at 14:01

## 2022-12-25 RX ADMIN — INSULIN LISPRO 4 UNITS: 100 INJECTION, SOLUTION INTRAVENOUS; SUBCUTANEOUS at 17:31

## 2022-12-25 RX ADMIN — NALOXEGOL OXALATE 12.5 MG: 12.5 TABLET, FILM COATED ORAL at 05:33

## 2022-12-25 RX ADMIN — GUAIFENESIN 600 MG: 600 TABLET ORAL at 08:39

## 2022-12-25 RX ADMIN — INSULIN GLARGINE 25 UNITS: 100 INJECTION, SOLUTION SUBCUTANEOUS at 21:50

## 2022-12-25 RX ADMIN — ENOXAPARIN SODIUM 40 MG: 100 INJECTION SUBCUTANEOUS at 08:39

## 2022-12-25 RX ADMIN — Medication 1000 MG: at 18:26

## 2022-12-25 RX ADMIN — HYDROCODONE BITARTRATE AND ACETAMINOPHEN 1 TABLET: 5; 325 TABLET ORAL at 01:24

## 2022-12-25 RX ADMIN — PANTOPRAZOLE SODIUM 40 MG: 40 TABLET, DELAYED RELEASE ORAL at 16:24

## 2022-12-25 RX ADMIN — INSULIN LISPRO 4 UNITS: 100 INJECTION, SOLUTION INTRAVENOUS; SUBCUTANEOUS at 21:49

## 2022-12-25 RX ADMIN — HYDROCODONE BITARTRATE AND ACETAMINOPHEN 1 TABLET: 5; 325 TABLET ORAL at 21:49

## 2022-12-25 RX ADMIN — HYDROCODONE BITARTRATE AND ACETAMINOPHEN 1 TABLET: 5; 325 TABLET ORAL at 07:28

## 2022-12-25 RX ADMIN — INSULIN LISPRO 6 UNITS: 100 INJECTION, SOLUTION INTRAVENOUS; SUBCUTANEOUS at 17:32

## 2022-12-25 RX ADMIN — INSULIN LISPRO 6 UNITS: 100 INJECTION, SOLUTION INTRAVENOUS; SUBCUTANEOUS at 08:39

## 2022-12-25 RX ADMIN — PANTOPRAZOLE SODIUM 40 MG: 40 TABLET, DELAYED RELEASE ORAL at 05:33

## 2022-12-25 RX ADMIN — TEMAZEPAM 15 MG: 15 CAPSULE ORAL at 21:49

## 2022-12-25 RX ADMIN — HYDROCODONE BITARTRATE AND ACETAMINOPHEN 1 TABLET: 5; 325 TABLET ORAL at 14:41

## 2022-12-25 RX ADMIN — INSULIN LISPRO 3 UNITS: 100 INJECTION, SOLUTION INTRAVENOUS; SUBCUTANEOUS at 14:02

## 2022-12-25 ASSESSMENT — PAIN DESCRIPTION - ORIENTATION: ORIENTATION: RIGHT;LEFT

## 2022-12-25 ASSESSMENT — PAIN DESCRIPTION - ONSET: ONSET: GRADUAL

## 2022-12-25 ASSESSMENT — PAIN SCALES - WONG BAKER: WONGBAKER_NUMERICALRESPONSE: 0

## 2022-12-25 ASSESSMENT — PAIN DESCRIPTION - LOCATION
LOCATION: HEAD;NECK;BACK
LOCATION: BACK
LOCATION: BACK;NECK
LOCATION: BACK;NECK

## 2022-12-25 ASSESSMENT — PAIN SCALES - GENERAL
PAINLEVEL_OUTOF10: 7
PAINLEVEL_OUTOF10: 7
PAINLEVEL_OUTOF10: 0
PAINLEVEL_OUTOF10: 8
PAINLEVEL_OUTOF10: 8

## 2022-12-25 ASSESSMENT — PAIN DESCRIPTION - DESCRIPTORS
DESCRIPTORS: ACHING
DESCRIPTORS: ACHING
DESCRIPTORS: ACHING;CRAMPING

## 2022-12-25 ASSESSMENT — PAIN DESCRIPTION - FREQUENCY: FREQUENCY: INTERMITTENT

## 2022-12-25 ASSESSMENT — PAIN DESCRIPTION - PAIN TYPE: TYPE: CHRONIC PAIN

## 2022-12-25 NOTE — PROGRESS NOTES
Pt currently has no iv access. Pt is refusing to be stuck at this time. Will let day team know and have them attempt again later.

## 2022-12-25 NOTE — PROGRESS NOTES
Hospitalist Progress Note  [facility]     Patient: Susan Fletcher  : 1980  MRN: [id number]  Code Status: Full Code    Hospital Day: 9   Date of Service: 2022    Subjective:   Patient seen and examined. No current complaints. Past Medical History:   Diagnosis Date    Chronic kidney disease     COVID-19     Hypertension     Type 1 diabetes ([address]        Past Surgical History:   Procedure Laterality Date    ADENOIDECTOMY         History reviewed. No pertinent family history.     Social History     Socioeconomic History    Marital status: Single     Spouse name: Not on file    Number of children: Not on file    Years of education: Not on file    Highest education level: Not on file   Occupational History    Not on file   Tobacco Use    Smoking status: Every Day     Packs/day: 0.50     Types: Cigarettes    Smokeless tobacco: Never   Vaping Use    Vaping Use: Every day   Substance and Sexual Activity    Alcohol use: Not Currently    Drug use: Not Currently    Sexual activity: Not on file   Other Topics Concern    Not on file   Social History Narrative    Not on file     Social Determinants of Health     Financial Resource Strain: Not on file   Food Insecurity: Not on file   Transportation Needs: Not on file   Physical Activity: Not on file   Stress: Not on file   Social Connections: Not on file   Intimate Partner Violence: Not on file   Housing Stability: Not on file       Current Facility-Administered Medications   Medication Dose Route Frequency Provider Last Rate Last Admin    insulin lispro (HUMALOG) injection vial 6 Units  6 Units SubCUTAneous TID WC Nigel Kaur MD   6 Units at 22 0839    insulin glargine (LANTUS) injection vial 25 Units  25 Units SubCUTAneous Nightly Nigel Kaur MD   25 Units at 22    vancomycin (VANCOCIN) intermittent dosing (placeholder)   Other RX Placeholder Ishaan Alvarez MD        vancomycin (VANCOCIN) 1000 mg in sodium chloride 0.9% 250 mL IVPB  1,000 mg IntraVENous Q24H Duc Fleming MD   Stopped at 12/24/22 1820    pantoprazole (PROTONIX) tablet 40 mg  40 mg Oral BID AC Farzad Anaya MD   40 mg at 12/25/22 0533    guaiFENesin [facility]) extended release tablet 600 mg  600 mg Oral BID Sweetie Hebert MD   600 mg at 12/25/22 4940    benzonatate (TESSALON) capsule 100 mg  100 mg Oral TID PRN Sewetie Hebert MD   100 mg at 12/24/22 1548    enoxaparin (LOVENOX) injection 40 mg  40 mg SubCUTAneous Daily Farzad Anaya MD   40 mg at 12/25/22 0839    insulin lispro (HUMALOG) injection vial 0-4 Units  0-4 Units SubCUTAneous TID WC Sweetie Hebert MD   4 Units at 12/24/22 1734    insulin lispro (HUMALOG) injection vial 0-4 Units  0-4 Units SubCUTAneous Nightly Sweetie Hebert MD   4 Units at 12/24/22 2052    HYDROcodone-acetaminophen ([facility]) 5-325 MG per tablet 1 tablet  1 tablet Oral Q6H PRN Sweetie Hebert MD   1 tablet at 12/25/22 7355    potassium chloride (KLOR-CON M) extended release tablet 40 mEq  40 mEq Oral PRN Sweetie Hebert MD        Or    potassium bicarb-citric acid (EFFER-K) effervescent tablet 40 mEq  40 mEq Oral PRN Sweetie Hebert MD        Or    potassium chloride 10 mEq/100 mL IVPB (Peripheral Line)  10 mEq IntraVENous PRN Sweetie Hebert MD        naloxegol (MOVANTIK) tablet 12.5 mg  12.5 mg Oral QAM AC Farzad Anaya MD   12.5 mg at 12/25/22 0533    ondansetron [facility]) injection 4 mg  4 mg IntraVENous Q6H PRN Farzad Anaya MD   4 mg at 12/17/22 2056    glucose chewable tablet 16 g  4 tablet Oral PRN Radha Vincent MD        dextrose bolus 10% 125 mL  125 mL IntraVENous PRN Radha Vincent MD        Or    dextrose bolus 10% 250 mL  250 mL IntraVENous PRN Radha Vincent MD        glucagon (rDNA) injection 1 mg  1 mg SubCUTAneous PRN Radha Vincent MD        dextrose 10 % infusion   IntraVENous Continuous PRN Radha Vincent MD        dextrose bolus 10% 125 mL  125 mL IntraVENous PRN Aleksey Leary MD        Or    dextrose bolus 10% 250 mL  250 mL IntraVENous PRN Aleksey Leary MD        magnesium sulfate 1000 mg in dextrose 5% 100 mL IVPB  1,000 mg IntraVENous PRN Aleksey Leary MD   Stopped at 12/18/22 2022    sodium phosphate 10 mmol in sodium chloride 0.9 % 250 mL IVPB  10 mmol IntraVENous PRN Aleksey Leary MD        Or    sodium phosphate 15 mmol in dextrose 5 % 250 mL IVPB  15 mmol IntraVENous PRN Aleksey Leary MD   Stopped at 12/18/22 2108    Or    sodium phosphate 20 mmol in dextrose 5 % 500 mL IVPB  20 mmol IntraVENous PRN Aleksey Leary MD        polyethylene glycol (GLYCOLAX) packet 17 g  17 g Oral Daily PRN Aleksey Leary MD        nicotine (NICODERM CQ) 7 MG/24HR 1 patch  1 patch TransDERmal Daily Aleksey Leary MD   1 patch at 12/22/22 0816         dextrose          Objective:   /88   Pulse (!) 110   Temp 98.1 °F (36.7 °C) (Oral)   Resp 18   Ht 6' (1.829 m)   Wt 139 lb 1.6 oz (63.1 kg)   SpO2 97%   BMI 18.87 kg/m²     General: no acute distress  HEENT: normocephalic  Neck: supple, symmetrical, trachea midline   Lungs: clear to auscultation bilaterally  Cardiovascular: s1 and s2 normal  Abdomen: soft, positive bowel sounds, nondistended, nontender  Extremities: no edema or cyanosis   Neuro: aaox3, no focal deficits    Recent Labs     12/23/22 0311 12/24/22 0223   WBC 15.5* 16.2*   RBC 2.86* 2.90*   HGB 8.5* 8.7*   HCT 25.9* 29.6*   MCV 90.6 102.1*   MCH 29.7 30.0   MCHC 32.8* 29.4*   * 539*     Recent Labs     12/23/22 0311 12/24/22 0223   * 130*   K 4.4 4.9   ANIONGAP 12 13   CL 98 94*   CO2 23 23   BUN 28* 29*   CREATININE 1.9* 1.8*   GLUCOSE 204* 322*   CALCIUM 8.6 8.6     Recent Labs     12/23/22 0311 12/24/22 0223   MG 1.8 1.8     No results for input(s): AST, ALT, ALB, BILITOT, ALKPHOS, ALB in the last 72 hours.   No results for input(s): PH, PO2, PCO2, HCO3, BE, O2SAT in the last 72 hours. No results for input(s): TROPONINI in the last 72 hours. No results for input(s): INR in the last 72 hours. No results for input(s): LACTA in the last 72 hours. Intake/Output Summary (Last 24 hours) at 12/25/2022 1116  Last data filed at 12/25/2022 1019  Gross per 24 hour   Intake 1044.7 ml   Output 525 ml   Net 519.7 ml       CT CHEST WO CONTRAST    Result Date: 12/24/2022  NO PRIOR REPORT AVAILABLE Exam: CT OF THE CHEST WITHOUT CONTRAST Clinical data: Further evaluation for multifocal mass like consolidation involving the lungs per MRI on 12/20/2022. Technique: Axial CT images through the lungs were acquired without contrast and imaged using soft tissue and lung algorithms. Reformatted/MPR images were performed. Radiation Dose: CTDIvol =6.22 mGy, DLP =246.72 mGy x cm. Limitations: Lack of intravenous contrast limits evaluation of the soft tissues and vascularity. Prior studies: MRI of the thoracic spine dated 12/20/2022. Findings: The thyroid is symmetric. Masslike opacities with cavitary central foci identified within the left upper and lower lobes. Similar-appearing pulmonary masslike opacity identified in the right upper lobe posterior segment abutting the pleura with air bronchogram formation. Similar-appearing air bronchogram and cystic bronchiectasis identified throughout the lesions of the left upper and lower lobes. There is no significant pleural fluid. Multiple lymph nodes identified within the pretracheal/retro vascular and the AP window. The pulmonary artery is normal in size and there is no significant filling defect recognized within the main pulmonary arterial vasculature. The aorta is normal in size without evidence of aneurysm or dissection. The heart size is normal without pericardial effusion. No significant mediastinal or hilar adenopathy is identified. The visualized ribs and thoracic vertebral bodies are intact.  The visualized portions of the upper abdomen are normal.    Impression: Multiple large cavitary masslike opacities with is cylindrical bronchiectasis and air bronchogram formation within the right upper, left lower and lingular segments. Differential diagnosis includes infectious, inflammatory and neoplastic pathology. All CT scans at this facility utilize dose modulation, iterative reconstruction, and/or weight based dosing when appropriate to reduce radiation dose to as low as reasonably achievable.  Dictated and Electronically Signed by Lorena Price MD at 24-Dec-2022 02:28:32 PM              Assessment and Plan:   MRSA bacteremia  Influenza A infection  Multifocal pneumonia  Rhinovirus infection  DKA  PETROS  Tobacco dependence     ID following  Vancomycin  Follow repeat blood cultures  TTE 12/19/2022 without mention of endocarditis  Plans for PICC line for long-term antibiotic therapy  CT chest reviewed  Discussed with ID  Pulmonary consult  Completed course of oseltamavir  Admits to noncompliance with insulin counseled  DKA resolved  Insulin gtt since transitioned to sc insulin  HbA1c 12.3 poor control counseled  Creatinine appears to be at baseline (1.8-2.0)  Counseled on cessation of tobacco  Lovenox for DVT prophylaxis  Discussed treatment plan with patient/RN/ID    Charles Tello MD   12/25/2022 11:16 AM

## 2022-12-25 NOTE — PROGRESS NOTES
Infectious Diseases Progress Note    Patient:  Jackson Barcenas  YOB: 1980  MRN: [id number]   Admit date: 12/16/2022   Admitting Physician: Gael Steiner MD  Primary Care Physician: MARÍA Root NP    Chief Complaint/Interval History: He is a same to a little bit better. No new localizing symptoms. A little bit of nonproductive cough. No dyspnea present. No chest pain. He has some upper back and neck pain. No new symptoms. Overall slowly better. No diarrhea or rash with his antibiotic treatment. He has new peripheral IV in right forearm that seems to be functioning.     In/Out    Intake/Output Summary (Last 24 hours) at 12/25/2022 1615  Last data filed at 12/25/2022 1442  Gross per 24 hour   Intake 1044.7 ml   Output 1275 ml   Net -230.3 ml     Allergies: No Known Allergies  Current Meds: insulin lispro (HUMALOG) injection vial 6 Units, TID WC  insulin glargine (LANTUS) injection vial 25 Units, Nightly  vancomycin (VANCOCIN) intermittent dosing (placeholder), RX Placeholder  vancomycin (VANCOCIN) 1000 mg in sodium chloride 0.9% 250 mL IVPB, Q24H  pantoprazole (PROTONIX) tablet 40 mg, BID AC  guaiFENesin (MUCINEX) extended release tablet 600 mg, BID  benzonatate (TESSALON) capsule 100 mg, TID PRN  enoxaparin (LOVENOX) injection 40 mg, Daily  insulin lispro (HUMALOG) injection vial 0-4 Units, TID WC  insulin lispro (HUMALOG) injection vial 0-4 Units, Nightly  HYDROcodone-acetaminophen (NORCO) 5-325 MG per tablet 1 tablet, Q6H PRN  potassium chloride (KLOR-CON M) extended release tablet 40 mEq, PRN   Or  potassium bicarb-citric acid (EFFER-K) effervescent tablet 40 mEq, PRN   Or  potassium chloride 10 mEq/100 mL IVPB (Peripheral Line), PRN  naloxegol (MOVANTIK) tablet 12.5 mg, QAM AC  ondansetron (ZOFRAN) injection 4 mg, Q6H PRN  glucose chewable tablet 16 g, PRN  dextrose bolus 10% 125 mL, PRN   Or  dextrose bolus 10% 250 mL, PRN  glucagon (rDNA) injection 1 mg, PRN  dextrose 10 % infusion, Continuous PRN  dextrose bolus 10% 125 mL, PRN   Or  dextrose bolus 10% 250 mL, PRN  magnesium sulfate 1000 mg in dextrose 5% 100 mL IVPB, PRN  sodium phosphate 10 mmol in sodium chloride 0.9 % 250 mL IVPB, PRN   Or  sodium phosphate 15 mmol in dextrose 5 % 250 mL IVPB, PRN   Or  sodium phosphate 20 mmol in dextrose 5 % 500 mL IVPB, PRN  polyethylene glycol (GLYCOLAX) packet 17 g, Daily PRN  nicotine (NICODERM CQ) 7 MG/24HR 1 patch, Daily      Review of Systems see HPI    VitalSigns:  BP (!) 131/91   Pulse (!) 110   Temp 97.7 °F (36.5 °C)   Resp 18   Ht 6' (1.829 m)   Wt 139 lb 1.6 oz (63.1 kg)   SpO2 99%   BMI 18.87 kg/m²      Physical Exam  Line/IV site: No erythema, warmth, induration, or tenderness.   Abdomen soft and nontender  Extremities without edema  Heart exam without change  Lab Results:  CBC:   Recent Labs     12/23/22 0311 12/24/22 0223   WBC 15.5* 16.2*   HGB 8.5* 8.7*   * 539*     BMP:  Recent Labs     12/23/22 0311 12/24/22 0223   * 130*   K 4.4 4.9   CL 98 94*   CO2 23 23   BUN 28* 29*   CREATININE 1.9* 1.8*   GLUCOSE 204* 322*     CultureResults:  Blood cultures December 17, 2022-no growth  Blood cultures December 17, 2022-MRSA (susceptibility outlined below)  Blood cultures December 19, 2022-MRSA  Blood cultures December 19, 2020-MRSA  Blood cultures December 21, 2022-no growth  Blood cultures December 21, 2022-no growth     Susceptibility     Methicillin-Resistant Staphylococcus aureus (1)     Antibiotic Interpretation Microscan   Method Status     benzylpenicillin Resistant >=0.5 mcg/mL BACTERIAL SUSCEPTIBILITY PANEL BY TYRA       clindamycin Sensitive 0.25 mcg/mL BACTERIAL SUSCEPTIBILITY PANEL BY TYRA       DAPTOmycin Sensitive 0.25 mcg/mL BACTERIAL SUSCEPTIBILITY PANEL BY TYRA       erythromycin Resistant >=8 mcg/mL BACTERIAL SUSCEPTIBILITY PANEL BY TYRA       inducible clindamycin resistance   Neg mcg/mL BACTERIAL SUSCEPTIBILITY PANEL BY TYRA       linezolid Sensitive 2 mcg/mL BACTERIAL SUSCEPTIBILITY PANEL BY TYRA       oxacillin Resistant >=4 mcg/mL BACTERIAL SUSCEPTIBILITY PANEL BY TYRA       tetracycline Sensitive <=1 mcg/mL BACTERIAL SUSCEPTIBILITY PANEL BY TYRA       trimethoprim-sulfamethoxazole Sensitive <=10 mcg/mL BACTERIAL SUSCEPTIBILITY PANEL BY TYRA       vancomycin Sensitive 1 mcg/mL BACTERIAL SUSCEPTIBILITY PANEL BY TYRA          Radiology:   CT chest done earlier today without contrast-radiology reading remains pending:  Personally reviewed the CT  Representative cross-section below       MRI thoracic spine:  Impression   Impression: 1. No evidence of fluid collection, discitis or osteomyelitis. 2.Multifocal masslike consolidation involving the lungs, which may represent   multifocal pneumonia or underlying neoplasm. CT of the chest could be performed   for further evaluation as clinically warranted. 3.Abnormal marrow edema involving the posterior left fifth rib near the   costovertebral junction, which is nonspecific and may be related to underlying   trauma. Please correlate with physical examination. MRI cervical spine:      Impression   Impression: 1. Mild degenerative changes of the cervical spine, causing mild spinal canal   stenosis with moderate left and mild to moderate right neural foraminal stenosis   at C3-C4. 2.No evidence of fluid collection, discitis or osteomyelitis. 3.Mild nonspecific edema involving the posterior paraspinal musculature. Additional Studies Reviewed:  2D echocardiogram done December 19, 2022:  Conclusions  Summary  LV is normal in size with borderline normal LV systolic function. LV ejection fraction estimated 50 to 55%. Mild concentric LVH. Diastolic parameters appear within normal range. RV is normal in size with normal systolic function. Left atrium appears normal in size. Right atrium appears normal in size. Aortic valve is trileaflet with normal leaflet mobility.  No significant stenosis or regurgitation. Mitral valve appears structurally normal with normal leaflet mobility. No stenosis or significant regurgitation. No significant tricuspid regurgitation noted. No significant pericardial effusion   Signature  Electronically signed by Lance Poole MD(Interpreting physician) on 12/19/2022 07:55 PM      Impression:  1. MRSA bacteremia  2. Admitted with influenza and rhinovirus-resolved  3. History of severe COVID infection earlier this year-he was hospitalized around March through June  4. Abnormal CT chest-cavitary areas present. He has seen Dr. Pawan King in [facility]. He may have had a CT scan of the chest at [facility] sometime this summer or early fall at could provide a comparison. 5.  History of tobacco use-no chewing tobacco  6. Diabetes mellitus  7.   Chronic renal insufficiency    Recommendations:  Continue vancomycin  Continue supportive care  Try to see if there is recent CT imaging of the chest [facility]  Continue to follow    Mino Rowell MD

## 2022-12-26 VITALS
TEMPERATURE: 98 F | RESPIRATION RATE: 16 BRPM | HEART RATE: 101 BPM | OXYGEN SATURATION: 96 % | WEIGHT: 142.19 LBS | SYSTOLIC BLOOD PRESSURE: 144 MMHG | HEIGHT: 72 IN | DIASTOLIC BLOOD PRESSURE: 88 MMHG | BODY MASS INDEX: 19.26 KG/M2

## 2022-12-26 LAB
BLOOD CULTURE, ROUTINE: NORMAL
CULTURE, BLOOD 2: NORMAL
GLUCOSE BLD-MCNC: 392 MG/DL (ref 70–99)
PERFORMED ON: ABNORMAL

## 2022-12-26 PROCEDURE — 6360000002 HC RX W HCPCS: Performed by: HOSPITALIST

## 2022-12-26 PROCEDURE — 94760 N-INVAS EAR/PLS OXIMETRY 1: CPT

## 2022-12-26 PROCEDURE — 6370000000 HC RX 637 (ALT 250 FOR IP): Performed by: HOSPITALIST

## 2022-12-26 PROCEDURE — 6370000000 HC RX 637 (ALT 250 FOR IP): Performed by: INTERNAL MEDICINE

## 2022-12-26 PROCEDURE — 82947 ASSAY GLUCOSE BLOOD QUANT: CPT

## 2022-12-26 RX ADMIN — HYDROCODONE BITARTRATE AND ACETAMINOPHEN 1 TABLET: 5; 325 TABLET ORAL at 05:38

## 2022-12-26 RX ADMIN — INSULIN LISPRO 4 UNITS: 100 INJECTION, SOLUTION INTRAVENOUS; SUBCUTANEOUS at 09:11

## 2022-12-26 RX ADMIN — INSULIN LISPRO 6 UNITS: 100 INJECTION, SOLUTION INTRAVENOUS; SUBCUTANEOUS at 09:35

## 2022-12-26 RX ADMIN — GUAIFENESIN 600 MG: 600 TABLET ORAL at 09:10

## 2022-12-26 RX ADMIN — ENOXAPARIN SODIUM 40 MG: 100 INJECTION SUBCUTANEOUS at 09:10

## 2022-12-26 RX ADMIN — PANTOPRAZOLE SODIUM 40 MG: 40 TABLET, DELAYED RELEASE ORAL at 05:34

## 2022-12-26 RX ADMIN — NALOXEGOL OXALATE 12.5 MG: 12.5 TABLET, FILM COATED ORAL at 05:34

## 2022-12-26 ASSESSMENT — PAIN DESCRIPTION - ORIENTATION: ORIENTATION: LOWER;MID;UPPER

## 2022-12-26 ASSESSMENT — PAIN DESCRIPTION - LOCATION: LOCATION: BACK;NECK

## 2022-12-26 ASSESSMENT — PAIN SCALES - GENERAL: PAINLEVEL_OUTOF10: 8

## 2022-12-26 ASSESSMENT — PAIN DESCRIPTION - DESCRIPTORS: DESCRIPTORS: ACHING

## 2022-12-26 NOTE — DISCHARGE SUMMARY
Hospitalist Discharge Summary  [facility]    Patient: Ghulam Carpenter  : 1980  MRN: [id number]  PCP: MARÍA Dangelo NP  Attending: Rock Adams MD  Admission Date: 2022  Discharge Date: 2022    Hospital Course:   Patient threatening to leave AMA on 2022. He is alert and oriented x3 and has full capacity to make informed decisions. I explained to the patient that leaving AMA could result in serious bodily harm and/or death. I further explained that if he ultimately did leave AMA, he had the right to return to the ER immediately with any concerning signs or symptoms. I made it very clear that remaining in the hospital until his treatment plan was completed was in his best interest.  Despite voicing understanding of all of the above, patient stated that nothing will change his mind and he remains adamant to leave AMA. Ralph Mcfarlane RN witnessed the entire conversation. Patient left AMA on 2022. Discharge Exam:   General: no acute distress  HEENT: normocephalic  Neck: supple, symmetrical, trachea midline   Lungs: clear to auscultation bilaterally  Cardiovascular: s1 and s2 normal  Abdomen: soft, positive bowel sounds, nondistended, nontender  Extremities: no edema or cyanosis   Neuro: aaox3, no focal deficits    Recent Labs     22   WBC 16.2*   HGB 8.7*   *     Recent Labs     22   *   K 4.9   CL 94*   CO2 23   BUN 29*   CREATININE 1.8*   GLUCOSE 322*     No results for input(s): AST, ALT, ALB, BILITOT, ALKPHOS in the last 72 hours. Troponin T: No results for input(s): TROPONINI in the last 72 hours. Pro-BNP: No results for input(s): BNP in the last 72 hours. INR: No results for input(s): INR in the last 72 hours.   UA:No results for input(s): NITRITE, [address], PHUR, LABCAST, [address], [address] [facility], MUCUS, TRICHOMONAS, YEAST, BACTERIA, CLARITYU, SPECGRAV, LEUKOCYTESUR, [address], [address] [facility], [facility] [address] GLUCOSEU, AMORPHOUS in the last 72 hours. A1C: No results for input(s): LABA1C in the last 72 hours. ABG:No results for input(s): PHART, KQW2SMP, PO2ART, [id number], BEART, HGBAE, F1HPQEPD, CARBOXHGBART in the last 72 hours.      Discharge Disposition: Lauren Neville MD  12/26/2022 3:23 PM
